# Patient Record
Sex: MALE | Race: WHITE | NOT HISPANIC OR LATINO | Employment: FULL TIME | URBAN - METROPOLITAN AREA
[De-identification: names, ages, dates, MRNs, and addresses within clinical notes are randomized per-mention and may not be internally consistent; named-entity substitution may affect disease eponyms.]

---

## 2017-01-17 ENCOUNTER — OFFICE VISIT (OUTPATIENT)
Dept: INTERNAL MEDICINE | Facility: CLINIC | Age: 57
End: 2017-01-17
Payer: COMMERCIAL

## 2017-01-17 VITALS
HEIGHT: 67 IN | BODY MASS INDEX: 36.81 KG/M2 | WEIGHT: 234.56 LBS | HEART RATE: 97 BPM | SYSTOLIC BLOOD PRESSURE: 118 MMHG | DIASTOLIC BLOOD PRESSURE: 74 MMHG

## 2017-01-17 DIAGNOSIS — R73.02 GLUCOSE INTOLERANCE (IMPAIRED GLUCOSE TOLERANCE): ICD-10-CM

## 2017-01-17 DIAGNOSIS — I10 ESSENTIAL HYPERTENSION: ICD-10-CM

## 2017-01-17 DIAGNOSIS — Z12.11 SCREEN FOR COLON CANCER: ICD-10-CM

## 2017-01-17 DIAGNOSIS — G43.511 INTRACTABLE PERSISTENT MIGRAINE AURA WITHOUT CEREBRAL INFARCTION AND WITH STATUS MIGRAINOSUS: Primary | ICD-10-CM

## 2017-01-17 DIAGNOSIS — Z13.9 SCREENING: ICD-10-CM

## 2017-01-17 DIAGNOSIS — Z23 NEED FOR TDAP VACCINATION: ICD-10-CM

## 2017-01-17 DIAGNOSIS — E66.01 MORBID OBESITY, UNSPECIFIED OBESITY TYPE: ICD-10-CM

## 2017-01-17 DIAGNOSIS — E78.2 HYPERLIPIDEMIA, MIXED: ICD-10-CM

## 2017-01-17 DIAGNOSIS — Z12.5 SCREENING FOR PROSTATE CANCER: ICD-10-CM

## 2017-01-17 PROCEDURE — 1159F MED LIST DOCD IN RCRD: CPT | Mod: S$GLB,,, | Performed by: INTERNAL MEDICINE

## 2017-01-17 PROCEDURE — 3074F SYST BP LT 130 MM HG: CPT | Mod: S$GLB,,, | Performed by: INTERNAL MEDICINE

## 2017-01-17 PROCEDURE — 3078F DIAST BP <80 MM HG: CPT | Mod: S$GLB,,, | Performed by: INTERNAL MEDICINE

## 2017-01-17 PROCEDURE — 99999 PR PBB SHADOW E&M-EST. PATIENT-LVL IV: CPT | Mod: PBBFAC,,, | Performed by: INTERNAL MEDICINE

## 2017-01-17 PROCEDURE — 99213 OFFICE O/P EST LOW 20 MIN: CPT | Mod: S$GLB,,, | Performed by: INTERNAL MEDICINE

## 2017-01-17 RX ORDER — BUTALBITAL, ACETAMINOPHEN AND CAFFEINE 50; 325; 40 MG/1; MG/1; MG/1
1 TABLET ORAL EVERY 4 HOURS PRN
Qty: 120 TABLET | Refills: 2 | Status: SHIPPED | OUTPATIENT
Start: 2017-01-17 | End: 2019-05-01 | Stop reason: ALTCHOICE

## 2017-01-17 RX ORDER — TIZANIDINE 2 MG/1
TABLET ORAL
Qty: 60 TABLET | Refills: 5 | Status: SHIPPED | OUTPATIENT
Start: 2017-01-17 | End: 2019-05-02

## 2017-01-17 RX ORDER — OMEPRAZOLE 20 MG/1
20 CAPSULE, DELAYED RELEASE ORAL EVERY MORNING
Qty: 90 CAPSULE | Refills: 3 | Status: SHIPPED | OUTPATIENT
Start: 2017-01-17 | End: 2017-12-25 | Stop reason: SDUPTHER

## 2017-01-17 RX ORDER — DIVALPROEX SODIUM 500 MG/1
500 TABLET, FILM COATED, EXTENDED RELEASE ORAL EVERY MORNING
Qty: 90 TABLET | Refills: 3
Start: 2017-01-17 | End: 2017-02-13 | Stop reason: SDUPTHER

## 2017-01-17 RX ORDER — VERAPAMIL HYDROCHLORIDE 240 MG/1
240 CAPSULE, EXTENDED RELEASE ORAL DAILY PRN
Qty: 90 CAPSULE | Refills: 3 | Status: SHIPPED | OUTPATIENT
Start: 2017-01-17 | End: 2018-02-10 | Stop reason: SDUPTHER

## 2017-01-17 RX ORDER — METFORMIN HYDROCHLORIDE 500 MG/1
500 TABLET, EXTENDED RELEASE ORAL 2 TIMES DAILY WITH MEALS
Qty: 180 TABLET | Refills: 3 | Status: SHIPPED | OUTPATIENT
Start: 2017-01-17 | End: 2018-02-21 | Stop reason: SDUPTHER

## 2017-01-17 RX ORDER — ATORVASTATIN CALCIUM 80 MG/1
80 TABLET, FILM COATED ORAL NIGHTLY
Qty: 90 TABLET | Refills: 3 | Status: SHIPPED | OUTPATIENT
Start: 2017-01-17 | End: 2018-02-10 | Stop reason: SDUPTHER

## 2017-01-17 RX ORDER — RIZATRIPTAN BENZOATE 10 MG/1
10 TABLET ORAL ONCE AS NEEDED
Qty: 18 TABLET | Refills: 6 | Status: SHIPPED | OUTPATIENT
Start: 2017-01-17 | End: 2019-05-02

## 2017-01-17 RX ORDER — FLUOXETINE HYDROCHLORIDE 40 MG/1
40 CAPSULE ORAL EVERY MORNING
Qty: 90 CAPSULE | Refills: 3 | Status: SHIPPED | OUTPATIENT
Start: 2017-01-17 | End: 2018-02-10 | Stop reason: SDUPTHER

## 2017-01-17 RX ORDER — OMEPRAZOLE 20 MG/1
20 CAPSULE, DELAYED RELEASE ORAL EVERY MORNING
Qty: 90 CAPSULE | Refills: 3 | Status: SHIPPED | OUTPATIENT
Start: 2017-01-17 | End: 2017-01-17 | Stop reason: SDUPTHER

## 2017-01-17 RX ORDER — TRAZODONE HYDROCHLORIDE 50 MG/1
50 TABLET ORAL NIGHTLY PRN
Qty: 90 TABLET | Refills: 3 | Status: SHIPPED | OUTPATIENT
Start: 2017-01-17 | End: 2018-02-27 | Stop reason: SDUPTHER

## 2017-01-17 RX ORDER — SILDENAFIL 100 MG/1
100 TABLET, FILM COATED ORAL DAILY PRN
Qty: 9 TABLET | Refills: 5 | Status: SHIPPED | OUTPATIENT
Start: 2017-01-17 | End: 2019-05-02

## 2017-01-17 NOTE — PATIENT INSTRUCTIONS
What is Insomnia?  Do you have trouble falling asleep? Do you wake up often during the night? Or, maybe you wake up too early in the morning. You may be suffering from insomnia. Talk to your health care provider if it lasts longer than a few weeks and you feel tired most of the time.    What causes insomnia?  Some common causes of insomnia are:  · Medical problems such as pain, depression, medication side effects, or trouble breathing  · Circadian rhythm disorder, a shift in the bodys normal 24-hour activity cycle  · Lifestyle factors such as a changing sleep schedule, lack of exercise, or too much caffeine  · Sleep settings such as a poor mattress, noise, or a room thats too hot or too cold  · Stress such as problems at work, money worries, or family events  Talk to your health care provider  Describe your sleeping problems to your health care provider. Try to keep a daily sleep diary for a couple weeks. Write down the time you go to bed, the time you wake up, and anything that seems to affect your sleep. Your health care provider can work with you to develop a treatment plan. You may need to learn good sleeping habits and make some lifestyle changes. If you have any medical problems, these may need to be treated first.  © 2960-1125 [employer]. [address] [place] [address], [place] [address]. All rights reserved. This information is not intended as a substitute for professional medical care. Always follow your healthcare professional's instructions.        Treating Insomnia  Good sleeping habits are a key part of treatment. If needed, some medications may help you sleep better at first. Making healthy lifestyle changes and learning to relax can improve your sleep. Treating insomnia takes commitment, but trust that your efforts will pay off. Talk to your health care provider before taking any medication.    Healthy lifestyle  Your lifestyle affects your health and your sleep. Here are some healthy  habits:  · Keep a regular sleep schedule. Go to bed and get up at the same time each day.  · Exercise regularly. It may help you reduce stress. Avoid strenuous exercise for 2 to 4 hours before bedtime.  · Avoid or limit naps, especially in the late afternoon.  · Use your bed only for sleep and sex.  · Dont spend too much time in bed trying to fall asleep. If you cant fall asleep, get up and do something (no electronics) until you become tired and drowsy.  · Avoid or limit caffeine and nicotine for up to 6 hours before bedtime. They can keep you awake at night.   · Also avoid alcohol for at least 4 to 6 hours before bedtime. It may help you fall asleep at first, but you will have more awakenings throughout the night, and your sleep will not be restful.  Before bedtime  To sleep better every night, try these tips:  · Have a bedtime routine to let your body and mind know when its time to sleep.  · Think of going to bed as relaxing and enjoyable. Sleep will come sooner.  · If your worries dont let you sleep, write them down in a diary. Then close it, and go to bed.  · Make sure the room is not too hot or too cold. If its not dark enough, an eye mask can help. If its noisy, try using earplugs.  Learn to relax  Stress, anxiety, and body tension may keep you awake at night. To unwind before bedtime, try a warm bath, meditation, or yoga. Also, try the following:  · Deep breathing. Sit or lie back in a chair. Take a slow, deep breath. Hold it for 5 counts. Then breathe out slowly through your mouth. Keep doing this until you feel relaxed.  · Progressive muscle relaxation. Tense and then relax the muscles in your body as you breathe deeply. Start with your feet and work up your body to your neck and face.  © 9760-2843 [employer]. [address] [place] [address], [place] [address]. All rights reserved. This information is not intended as a substitute for professional medical care. Always follow your healthcare  professional's instructions.        Insomnia  Insomnia is repeated difficulty going to sleep or staying asleep, or both. Whether you have insomnia is not defined by a specific amount of sleep. Different people need different amounts of sleep, and you may need more or less sleep at different times of your life.  There are 3 major types of insomnia:  short-term, chronic, and other.  Short-term, or acute insomnia lasts less than 3 months.  The symptoms are temporary and can be linked directly to a stressor, such as the death of a loved one, financial problems, or a new physical problem.  Short-term insomnia stops when the stressor resolves or the person adapts to its presence.  Chronic insomnia occurs at least 3 times a week and lasts longer than 3 months.  Chronic insomnia can occur when either the cause of the sleeping problem is not clear, or the insomnia does not get better when the stressor is resolved. A number of other criteria are also used to make the diagnosis of chronic insomnia.   Other insomnia is the third type of insomnia-related sleep disorders.  This description applies to people who have problems getting to sleep or staying asleep, but do not meet all of the factors that describe either short-term or chronic insomnia.    Many things cause insomnia. Different people may have different causes. It can be from an underlying medical or psychological condition, or lifestyle. It can also be primary insomnia, which means no cause can be found.  Causes of insomnia include:  · Chronic medical problems- heart disease, gastrointestinal problems, hormonal changes, breathing problems  · Anxiety  · Stress  · Depression  · Pain  · Work schedule  · Sleep apnea  · Illegal drugs  · Certain medicines  Many different medidcines can affect your sleep, such as stimulants, caffeine, alcohol, some decongestants, and diet pills. Other medicines may include some types of blood pressure pills, steroids, asthma medicines,  antihistamines, antidepressants, seizure medicines and statins. Not all of these will affect your sleep, and they shouldnt be stopped without talking to your doctor.  Symptoms of insomnia can include:  · Lying awake for long periods at night before falling asleep  · Waking up several times during the night  · Waking up early in the morning and not being able to get back to sleep  · Feeling tired and not refreshed by sleep  · Not being able to function properly during the day and finding it hard to concentrate  · Irritability  · Tiredness and fatigue during the day  Home care  1. Review your medicines with your doctor or pharmacist to find out if they can cause insomnia. Not all medicines will affect your sleep, but they shouldn't be stopped without reviewing them with your doctor. There may be serious side effects and consequences from suddenly stopping your medicines. Not taking them may cause strokes, heart attacks, and many other problems.  2. Caffeine, smoking and alcohol also affect sleep. Limit your daily use and do not use these before bedtime. Alcohol may make you sleepy at first, but as its effects wear off, you may awaken a few hours later and have trouble returning to sleep.  3. Do not exercise, eat or drink large amounts of liquid within 2 hours of your bedtime.  4. Improve your sleep habits. Have a fixed bed and wake-up time. Try to keep noise, light and heat in your bedroom at a comfortable level. Try using earplugs or eyeshades if needed.   5. Avoid watching TV in bed.  6. If you do not fall asleep within 30 minutes, try to relax by reading or listening to soft music.  7. Limit daytime napping to one 30 minute period, early in the day.  8. Get regular exercise. Find other ways to lessen your stress level.  9. If a medicine was prescribed to help reset your sleep patterns, take it as directed. Sleeping pills are intended for short-term use, only. If taken for too long, the effect wears off while the  risk of physical addiction and psychological dependence increases.  Sleep diary  If the cause isnt obvious and it is not improving, try keeping a sleep diary for a couple of weeks. Include in it:  · The time you go to bed  · How long it takes to fall asleep  · How many times you wake up  · What time you wake up  · Your meal times and what you eat  · What time you drink alcohol  · Your exercise habits and times  Follow-up care  Follow up with your healthcare provider, or as advised. If X-rays or CT scans were done, you will be notified if there is a change in the reading, especially if it affects treatment.  Call 911  Call 911 if any of these occur:  · Trouble breathing  · Confusion or trouble waking  · Fainting or loss of consciousness  · Rapid heart rate  · New chest, arm, shoulder, neck or upper back pain  · Trouble with speech or vision, weakness of an arm or leg  · Trouble walking or talking, loss of balance, numbness or weakness in one side of your body, facial droop  When to seek medical advice  Call your healthcare provider right away if any of these occur:  · Extreme restlessness or irritability  · Confusion or hallucinations (seeing or hearing things that are not there)  · Anxiety, depression  · Several days without sleeping  © 0592-3240 [employer]. [address] [place] [address], [place] [address]. All rights reserved. This information is not intended as a substitute for professional medical care. Always follow your healthcare professional's instructions.      Sildenafil Citrate Oral tablet  What is this medicine?  SILDENAFIL (leno DEN a esperanza) is used to treat erection problems in men.  This medicine may be used for other purposes; ask your health care provider or pharmacist if you have questions.  What should I tell my health care provider before I take this medicine?  They need to know if you have any of these conditions:  · bleeding disorders  · eye or vision problems, including a rare inherited  eye disease called retinitis pigmentosa  · anatomical deformation of the penis, Peyronies disease, or history of priapism (painful and prolonged erection)  · heart disease, angina, a history of heart attack, irregular heart beats, or other heart problems  · high or low blood pressure  · history of blood diseases, like sickle cell anemia or leukemia  · history of stomach bleeding  · kidney disease  · liver disease  · stroke  · an unusual or allergic reaction to sildenafil, other medicines, foods, dyes, or preservatives  · pregnant or trying to get pregnant  · breast-feeding  How should I use this medicine?  Take this medicine by mouth with a glass of water. Follow the directions on the prescription label. The dose is usually taken 1 hour before sexual activity. You should not take the dose more than once per day. Do not take your medicine more often than directed.  Talk to your pediatrician regarding the use of this medicine in children. This medicine is not used in children for this condition.  Overdosage: If you think you have taken too much of this medicine contact a poison control center or emergency room at once.  NOTE: This medicine is only for you. Do not share this medicine with others.  What if I miss a dose?  This does not apply. Do not take double or extra doses.  What may interact with this medicine?  Do not take this medicine with any of the following medications:  · cisapride  · methscopolamine nitrate  · nitrates like amyl nitrite, isosorbide dinitrate, isosorbide mononitrate, nitroglycerin  · nitroprusside  · other medicines for erectile dysfunction like avanafil, tadalafil, vardenafil  · riociguat  · other sildenafil products (Revatio)  This medicine may also interact with the following medications:  · certain drugs for high blood pressure  · certain drugs for the treatment of HIV infection or AIDS  · certain drugs used for fungal or yeast infections, like fluconazole, itraconazole, ketoconazole,  and voriconazole  · cimetidine  · erythromycin  · rifampin  This list may not describe all possible interactions. Give your health care provider a list of all the medicines, herbs, non-prescription drugs, or dietary supplements you use. Also tell them if you smoke, drink alcohol, or use illegal drugs. Some items may interact with your medicine.  What should I watch for while using this medicine?  If you notice any changes in your vision while taking this drug, call your doctor or health care professional as soon as possible. Stop using this medicine and call your health care provider right away if you have a loss of sight in one or both eyes.  Contact your doctor or health care professional right away if you have an erection that lasts longer than 4 hours or if it becomes painful. This may be a sign of a serious problem and must be treated right away to prevent permanent damage.  If you experience symptoms of nausea, dizziness, chest pain or arm pain upon initiation of sexual activity after taking this medicine, you should refrain from further activity and call your doctor or health care professional as soon as possible.  Do not drink alcohol to excess (examples, 5 glasses of wine or 5 shots of whiskey) when taking this medicine. When taken in excess, alcohol can increase your chances of getting a headache or getting dizzy, increasing your heart rate or lowering your blood pressure.  Using this medicine does not protect you or your partner against HIV infection (the virus that causes AIDS) or other sexually transmitted diseases.  What side effects may I notice from receiving this medicine?  Side effects that you should report to your doctor or health care professional as soon as possible:  · allergic reactions like skin rash, itching or hives, swelling of the face, lips, or tongue  · breathing problems  · changes in hearing  · changes in vision  · chest pain  · fast, irregular heartbeat  · prolonged or painful  erection  · seizures  Side effects that usually do not require medical attention (report to your doctor or health care professional if they continue or are bothersome):  · back pain  · dizziness  · flushing  · headache  · indigestion  · muscle aches  · nausea  · stuffy or runny nose  This list may not describe all possible side effects. Call your doctor for medical advice about side effects. You may report side effects to FDA at 3-943-ORB-1186.  Where should I keep my medicine?  Keep out of reach of children.  Store at room temperature between 15 and 30 degrees C (59 and 86 degrees F). Throw away any unused medicine after the expiration date.  NOTE:This sheet is a summary. It may not cover all possible information. If you have questions about this medicine, talk to your doctor, pharmacist, or health care provider. Copyright© 2016 [employer]

## 2017-01-17 NOTE — MR AVS SNAPSHOT
[facility]  [address]  [facility] [address]  Phone: [phone]  Fax: [phone]                  David Nicole   2017 1:20 PM   Office Visit    Description:  Male : 1960   Provider:  Sejal Hughes MD   Department:  [facility]           Reason for Visit     Medication Refill           Diagnoses this Visit        Comments    Intractable persistent migraine aura without cerebral infarction and with status migrainosus    -  Primary     Morbid obesity, unspecified obesity type         Hyperlipidemia, mixed         Essential hypertension         Screening         Screen for colon cancer         Glucose intolerance (impaired glucose tolerance)         Screening for prostate cancer         Need for Tdap vaccination                To Do List           Future Appointments        Provider Department Dept Phone    2017 8:10 AM LAB, APPOINTMENT [facility] [phone]      To Schedule:     Please call the Endoscopy Department at [phone] to schedule your appointment.          Goals (5 Years of Data)     None      Follow-Up and Disposition     Return in about 6 months (around 2017).       These Medications        Disp Refills Start End    divalproex (DEPAKOTE) 500 MG Tb24 90 tablet 3 2017     Take 1 tablet (500 mg total) by mouth every morning. - Oral    Pharmacy: [facility] - [address] Ph #: [phone]       atorvastatin (LIPITOR) 80 MG tablet 90 tablet 3 2017     Take 1 tablet (80 mg total) by mouth nightly. - Oral    Pharmacy: [facility] - [address] Ph #: [phone]       verapamil (VERELAN) 240 MG C24P 90 capsule 3 2017     Take 1 capsule (240 mg total) by mouth daily as needed. Blood pressure control and migraine prevention - Oral    Pharmacy: [facility] -  [address] Ph #: [phone]       metformin (GLUCOPHAGE-XR) 500 MG 24 hr tablet 180 tablet 3 1/17/2017     Take 1 tablet (500 mg total) by mouth 2 (two) times daily with meals. - Oral    Pharmacy: [facility] - [address] Ph #: [phone]       fluoxetine (PROZAC) 40 MG capsule 90 capsule 3 1/17/2017     Take 1 capsule (40 mg total) by mouth every morning. - Oral    Pharmacy: [facility] - [address] Ph #: [phone]       rizatriptan (MAXALT) 10 MG tablet 18 tablet 6 1/17/2017 1/17/2017    Take 1 tablet (10 mg total) by mouth once as needed for Migraine. Only one tablet per 24 hours - Oral    [facility]: [facility] [place] [address] Ph #: [phone]       Notes to Pharmacy: Please provide the maximum number of tablets allowed by the insurance company    butalbital-acetaminophen-caffeine -40 mg (FIORICET, ESGIC) -40 mg per tablet 120 tablet 2 1/17/2017     Take 1 tablet by mouth every 4 (four) hours as needed for Pain. - Oral    [facility]: [facility] [place] [address] Ph #: [phone]       tizanidine (ZANAFLEX) 2 MG tablet 60 tablet 5 1/17/2017     1-2 pills around bedtime as needed for headache or neck pain    Pharmacy: [facility] [place] [address] Ph #: [phone]       trazodone (DESYREL) 50 MG tablet 90 tablet 3 1/17/2017     Take 1 tablet (50 mg total) by mouth nightly as needed for Insomnia. - Oral    [facility]: [facility] [place] [address] Ph #: [phone]       sildenafil (VIAGRA) 100 MG tablet 9 tablet 5 1/17/2017 1/17/2018    Take 1 tablet (100 mg total) by mouth daily as needed for Erectile Dysfunction. - Oral    Ph[facility]acy: [id number] - [facility] [address] Ph #: [phone]       omeprazole (PRILOSEC) 20 MG capsule 90  capsule 3 1/17/2017     Take 1 capsule (20 mg total) by mouth every morning. - Oral    Pharmacy: [facility] - [address] #: [phone]         [facility]     [facility] On Call Nurse Care Line - 24/7 Assistance  Registered nurses in the [facility] Call Center provide clinical advisement, health education, appointment booking, and other advisory services.  Call for this free service at [phone].             Medications           START taking these NEW medications        Refills    sildenafil (VIAGRA) 100 MG tablet 5    Sig: Take 1 tablet (100 mg total) by mouth daily as needed for Erectile Dysfunction.    Class: Normal    Route: Oral      CHANGE how you are taking these medications     Start Taking Instead of    divalproex (DEPAKOTE) 500 MG Tb24 divalproex (DEPAKOTE) 500 MG Tb24    Dosage:  Take 1 tablet (500 mg total) by mouth every morning. Dosage:  Take 2 tablets (1,000 mg total) by mouth every morning.    Reason for Change:  Reorder     atorvastatin (LIPITOR) 80 MG tablet atorvastatin (LIPITOR) 80 MG tablet    Dosage:  Take 1 tablet (80 mg total) by mouth nightly. Dosage:  TAKE 1 TABLET ONCE DAILY    Reason for Change:  Reorder     verapamil (VERELAN) 240 MG C24P verapamil (VERELAN) 240 MG C24P    Dosage:  Take 1 capsule (240 mg total) by mouth daily as needed. Blood pressure control and migraine prevention Dosage:  TAKE 1 CAPSULE ONCE DAILY    Reason for Change:  Reorder     metformin (GLUCOPHAGE-XR) 500 MG 24 hr tablet metformin (GLUCOPHAGE-XR) 500 MG 24 hr tablet    Dosage:  Take 1 tablet (500 mg total) by mouth 2 (two) times daily with meals. Dosage:  TAKE 1 TABLET TWICE A DAY WITH MEALS    Reason for Change:  Reorder     fluoxetine (PROZAC) 40 MG capsule fluoxetine (PROZAC) 40 MG capsule    Dosage:  Take 1 capsule (40 mg total) by mouth every morning. Dosage:  TAKE 1 CAPSULE ONCE DAILY    Reason for Change:  Reorder     rizatriptan (MAXALT) 10 MG  tablet rizatriptan (MAXALT) 10 MG tablet    Dosage:  Take 1 tablet (10 mg total) by mouth once as needed for Migraine. Only one tablet per 24 hours     Reason for Change:  Reorder     omeprazole (PRILOSEC) 20 MG capsule omeprazole (PRILOSEC) 40 MG capsule    Dosage:  Take 1 capsule (20 mg total) by mouth every morning. Dosage:  Take 1 capsule (40 mg total) by mouth every morning.    Reason for Change:  Reorder       STOP taking these medications     amoxicillin-clavulanate 875-125mg (AUGMENTIN) 875-125 mg per tablet            Verify that the below list of medications is an accurate representation of the medications you are currently taking.  If none reported, the list may be blank. If incorrect, please contact your healthcare provider. Carry this list with you in case of emergency.           Current Medications     ascorbic acid, vitamin C, (VITAMIN C) 100 MG tablet Take 100 mg by mouth once daily.    atorvastatin (LIPITOR) 80 MG tablet Take 1 tablet (80 mg total) by mouth nightly.    butalbital-acetaminophen-caffeine -40 mg (FIORICET, ESGIC) -40 mg per tablet Take 1 tablet by mouth every 4 (four) hours as needed for Pain.    CYANOCOBALAMIN, VITAMIN B-12, (VITAMIN B-12 ORAL) Take by mouth.    divalproex (DEPAKOTE) 500 MG Tb24 Take 1 tablet (500 mg total) by mouth every morning.    fluoxetine (PROZAC) 40 MG capsule Take 1 capsule (40 mg total) by mouth every morning.    fluticasone (FLONASE) 50 mcg/actuation nasal spray USE 2 SPRAYS IN EACH NOSTRIL ONCE DAILY    metformin (GLUCOPHAGE-XR) 500 MG 24 hr tablet Take 1 tablet (500 mg total) by mouth 2 (two) times daily with meals.    multivitamin (ONE DAILY MULTIVITAMIN) per tablet Take 1 tablet by mouth once daily.    omeprazole (PRILOSEC) 20 MG capsule Take 1 capsule (20 mg total) by mouth every morning.    ondansetron (ZOFRAN-ODT) 8 MG TbDL Take 1 tablet (8 mg total) by mouth every 8 (eight) hours as needed.    rizatriptan (MAXALT) 10 MG tablet Take 1  "tablet (10 mg total) by mouth once as needed for Migraine. Only one tablet per 24 hours    SAW PALMETTO ORAL Take by mouth.    tizanidine (ZANAFLEX) 2 MG tablet 1-2 pills around bedtime as needed for headache or neck pain    trazodone (DESYREL) 50 MG tablet Take 1 tablet (50 mg total) by mouth nightly as needed for Insomnia.    verapamil (VERELAN) 240 MG C24P Take 1 capsule (240 mg total) by mouth daily as needed. Blood pressure control and migraine prevention    sildenafil (VIAGRA) 100 MG tablet Take 1 tablet (100 mg total) by mouth daily as needed for Erectile Dysfunction.           Clinical Reference Information           Vital Signs - Last Recorded  Most recent update: 1/17/2017  2:31 PM by Sejal Hughes MD    BP Pulse Ht Wt BMI    118/74 97 5' 7" (1.702 m) 106.4 kg (234 lb 9.1 oz) 36.74 kg/m2      Blood Pressure          Most Recent Value    BP  118/74      Allergies as of 1/17/2017     No Known Allergies      Immunizations Administered on Date of Encounter - 1/17/2017     None      Orders Placed During Today's Visit      Normal Orders This Visit    Ambulatory Referral to [facility]     Case request GI: COLONOSCOPY     Future Labs/Procedures Expected by Expires    CBC auto differential  1/17/2017 1/17/2018    Comprehensive metabolic panel  1/17/2017 3/18/2018    Hemoglobin A1c  1/17/2017 3/18/2018    Hepatitis C antibody  1/17/2017 1/17/2018    Lipid panel  1/17/2017 3/18/2018    PSA, Screening  1/17/2017 3/18/2018    TSH  1/17/2017 3/18/2018      Instructions      What is Insomnia?  Do you have trouble falling asleep? Do you wake up often during the night? Or, maybe you wake up too early in the morning. You may be suffering from insomnia. Talk to your health care provider if it lasts longer than a few weeks and you feel tired most of the time.    What causes insomnia?  Some common causes of insomnia are:  · Medical problems such as pain, depression, medication side effects, or " trouble breathing  · Circadian rhythm disorder, a shift in the bodys normal 24-hour activity cycle  · Lifestyle factors such as a changing sleep schedule, lack of exercise, or too much caffeine  · Sleep settings such as a poor mattress, noise, or a room thats too hot or too cold  · Stress such as problems at work, money worries, or family events  Talk to your health care provider  Describe your sleeping problems to your health care provider. Try to keep a daily sleep diary for a couple weeks. Write down the time you go to bed, the time you wake up, and anything that seems to affect your sleep. Your health care provider can work with you to develop a treatment plan. You may need to learn good sleeping habits and make some lifestyle changes. If you have any medical problems, these may need to be treated first.  © 4795-8200 [employer]. [address] [place] [address]. All rights reserved. This information is not intended as a substitute for professional medical care. Always follow your healthcare professional's instructions.        Treating Insomnia  Good sleeping habits are a key part of treatment. If needed, some medications may help you sleep better at first. Making healthy lifestyle changes and learning to relax can improve your sleep. Treating insomnia takes commitment, but trust that your efforts will pay off. Talk to your health care provider before taking any medication.    Healthy lifestyle  Your lifestyle affects your health and your sleep. Here are some healthy habits:  · Keep a regular sleep schedule. Go to bed and get up at the same time each day.  · Exercise regularly. It may help you reduce stress. Avoid strenuous exercise for 2 to 4 hours before bedtime.  · Avoid or limit naps, especially in the late afternoon.  · Use your bed only for sleep and sex.  · Dont spend too much time in bed trying to fall asleep. If you cant fall asleep, get up and do something (no  electronics) until you become tired and drowsy.  · Avoid or limit caffeine and nicotine for up to 6 hours before bedtime. They can keep you awake at night.   · Also avoid alcohol for at least 4 to 6 hours before bedtime. It may help you fall asleep at first, but you will have more awakenings throughout the night, and your sleep will not be restful.  Before bedtime  To sleep better every night, try these tips:  · Have a bedtime routine to let your body and mind know when its time to sleep.  · Think of going to bed as relaxing and enjoyable. Sleep will come sooner.  · If your worries dont let you sleep, write them down in a diary. Then close it, and go to bed.  · Make sure the room is not too hot or too cold. If its not dark enough, an eye mask can help. If its noisy, try using earplugs.  Learn to relax  Stress, anxiety, and body tension may keep you awake at night. To unwind before bedtime, try a warm bath, meditation, or yoga. Also, try the following:  · Deep breathing. Sit or lie back in a chair. Take a slow, deep breath. Hold it for 5 counts. Then breathe out slowly through your mouth. Keep doing this until you feel relaxed.  · Progressive muscle relaxation. Tense and then relax the muscles in your body as you breathe deeply. Start with your feet and work up your body to your neck and face.  © 1735-4844 [employer]. [address] [place] [address], [place] [address]. All rights reserved. This information is not intended as a substitute for professional medical care. Always follow your healthcare professional's instructions.        Insomnia  Insomnia is repeated difficulty going to sleep or staying asleep, or both. Whether you have insomnia is not defined by a specific amount of sleep. Different people need different amounts of sleep, and you may need more or less sleep at different times of your life.  There are 3 major types of insomnia:  short-term, chronic, and other.  Short-term, or acute insomnia  lasts less than 3 months.  The symptoms are temporary and can be linked directly to a stressor, such as the death of a loved one, financial problems, or a new physical problem.  Short-term insomnia stops when the stressor resolves or the person adapts to its presence.  Chronic insomnia occurs at least 3 times a week and lasts longer than 3 months.  Chronic insomnia can occur when either the cause of the sleeping problem is not clear, or the insomnia does not get better when the stressor is resolved. A number of other criteria are also used to make the diagnosis of chronic insomnia.   Other insomnia is the third type of insomnia-related sleep disorders.  This description applies to people who have problems getting to sleep or staying asleep, but do not meet all of the factors that describe either short-term or chronic insomnia.    Many things cause insomnia. Different people may have different causes. It can be from an underlying medical or psychological condition, or lifestyle. It can also be primary insomnia, which means no cause can be found.  Causes of insomnia include:  · Chronic medical problems- heart disease, gastrointestinal problems, hormonal changes, breathing problems  · Anxiety  · Stress  · Depression  · Pain  · Work schedule  · Sleep apnea  · Illegal drugs  · Certain medicines  Many different medidcines can affect your sleep, such as stimulants, caffeine, alcohol, some decongestants, and diet pills. Other medicines may include some types of blood pressure pills, steroids, asthma medicines, antihistamines, antidepressants, seizure medicines and statins. Not all of these will affect your sleep, and they shouldnt be stopped without talking to your doctor.  Symptoms of insomnia can include:  · Lying awake for long periods at night before falling asleep  · Waking up several times during the night  · Waking up early in the morning and not being able to get back to sleep  · Feeling tired and not refreshed  by sleep  · Not being able to function properly during the day and finding it hard to concentrate  · Irritability  · Tiredness and fatigue during the day  Home care  1. Review your medicines with your doctor or pharmacist to find out if they can cause insomnia. Not all medicines will affect your sleep, but they shouldn't be stopped without reviewing them with your doctor. There may be serious side effects and consequences from suddenly stopping your medicines. Not taking them may cause strokes, heart attacks, and many other problems.  2. Caffeine, smoking and alcohol also affect sleep. Limit your daily use and do not use these before bedtime. Alcohol may make you sleepy at first, but as its effects wear off, you may awaken a few hours later and have trouble returning to sleep.  3. Do not exercise, eat or drink large amounts of liquid within 2 hours of your bedtime.  4. Improve your sleep habits. Have a fixed bed and wake-up time. Try to keep noise, light and heat in your bedroom at a comfortable level. Try using earplugs or eyeshades if needed.   5. Avoid watching TV in bed.  6. If you do not fall asleep within 30 minutes, try to relax by reading or listening to soft music.  7. Limit daytime napping to one 30 minute period, early in the day.  8. Get regular exercise. Find other ways to lessen your stress level.  9. If a medicine was prescribed to help reset your sleep patterns, take it as directed. Sleeping pills are intended for short-term use, only. If taken for too long, the effect wears off while the risk of physical addiction and psychological dependence increases.  Sleep diary  If the cause isnt obvious and it is not improving, try keeping a sleep diary for a couple of weeks. Include in it:  · The time you go to bed  · How long it takes to fall asleep  · How many times you wake up  · What time you wake up  · Your meal times and what you eat  · What time you drink alcohol  · Your exercise habits and  times  Follow-up care  Follow up with your healthcare provider, or as advised. If X-rays or CT scans were done, you will be notified if there is a change in the reading, especially if it affects treatment.  Call 911  Call 911 if any of these occur:  · Trouble breathing  · Confusion or trouble waking  · Fainting or loss of consciousness  · Rapid heart rate  · New chest, arm, shoulder, neck or upper back pain  · Trouble with speech or vision, weakness of an arm or leg  · Trouble walking or talking, loss of balance, numbness or weakness in one side of your body, facial droop  When to seek medical advice  Call your healthcare provider right away if any of these occur:  · Extreme restlessness or irritability  · Confusion or hallucinations (seeing or hearing things that are not there)  · Anxiety, depression  · Several days without sleeping  © 2795-2445 [employer]. [address] [place] [address]. All rights reserved. This information is not intended as a substitute for professional medical care. Always follow your healthcare professional's instructions.      Sildenafil Citrate Oral tablet  What is this medicine?  SILDENAFIL (leno DEN a esperanza) is used to treat erection problems in men.  This medicine may be used for other purposes; ask your health care provider or pharmacist if you have questions.  What should I tell my health care provider before I take this medicine?  They need to know if you have any of these conditions:  · bleeding disorders  · eye or vision problems, including a rare inherited eye disease called retinitis pigmentosa  · anatomical deformation of the penis, Peyronies disease, or history of priapism (painful and prolonged erection)  · heart disease, angina, a history of heart attack, irregular heart beats, or other heart problems  · high or low blood pressure  · history of blood diseases, like sickle cell anemia or leukemia  · history of stomach bleeding  · kidney disease  · liver  disease  · stroke  · an unusual or allergic reaction to sildenafil, other medicines, foods, dyes, or preservatives  · pregnant or trying to get pregnant  · breast-feeding  How should I use this medicine?  Take this medicine by mouth with a glass of water. Follow the directions on the prescription label. The dose is usually taken 1 hour before sexual activity. You should not take the dose more than once per day. Do not take your medicine more often than directed.  Talk to your pediatrician regarding the use of this medicine in children. This medicine is not used in children for this condition.  Overdosage: If you think you have taken too much of this medicine contact a poison control center or emergency room at once.  NOTE: This medicine is only for you. Do not share this medicine with others.  What if I miss a dose?  This does not apply. Do not take double or extra doses.  What may interact with this medicine?  Do not take this medicine with any of the following medications:  · cisapride  · methscopolamine nitrate  · nitrates like amyl nitrite, isosorbide dinitrate, isosorbide mononitrate, nitroglycerin  · nitroprusside  · other medicines for erectile dysfunction like avanafil, tadalafil, vardenafil  · riociguat  · other sildenafil products (Revatio)  This medicine may also interact with the following medications:  · certain drugs for high blood pressure  · certain drugs for the treatment of HIV infection or AIDS  · certain drugs used for fungal or yeast infections, like fluconazole, itraconazole, ketoconazole, and voriconazole  · cimetidine  · erythromycin  · rifampin  This list may not describe all possible interactions. Give your health care provider a list of all the medicines, herbs, non-prescription drugs, or dietary supplements you use. Also tell them if you smoke, drink alcohol, or use illegal drugs. Some items may interact with your medicine.  What should I watch for while using this medicine?  If you  notice any changes in your vision while taking this drug, call your doctor or health care professional as soon as possible. Stop using this medicine and call your health care provider right away if you have a loss of sight in one or both eyes.  Contact your doctor or health care professional right away if you have an erection that lasts longer than 4 hours or if it becomes painful. This may be a sign of a serious problem and must be treated right away to prevent permanent damage.  If you experience symptoms of nausea, dizziness, chest pain or arm pain upon initiation of sexual activity after taking this medicine, you should refrain from further activity and call your doctor or health care professional as soon as possible.  Do not drink alcohol to excess (examples, 5 glasses of wine or 5 shots of whiskey) when taking this medicine. When taken in excess, alcohol can increase your chances of getting a headache or getting dizzy, increasing your heart rate or lowering your blood pressure.  Using this medicine does not protect you or your partner against HIV infection (the virus that causes AIDS) or other sexually transmitted diseases.  What side effects may I notice from receiving this medicine?  Side effects that you should report to your doctor or health care professional as soon as possible:  · allergic reactions like skin rash, itching or hives, swelling of the face, lips, or tongue  · breathing problems  · changes in hearing  · changes in vision  · chest pain  · fast, irregular heartbeat  · prolonged or painful erection  · seizures  Side effects that usually do not require medical attention (report to your doctor or health care professional if they continue or are bothersome):  · back pain  · dizziness  · flushing  · headache  · indigestion  · muscle aches  · nausea  · stuffy or runny nose  This list may not describe all possible side effects. Call your doctor for medical advice about side effects. You may report  side effects to FDA at 6-017-FDA-0621.  Where should I keep my medicine?  Keep out of reach of children.  Store at room temperature between 15 and 30 degrees C (59 and 86 degrees F). Throw away any unused medicine after the expiration date.  NOTE:This sheet is a summary. It may not cover all possible information. If you have questions about this medicine, talk to your doctor, pharmacist, or health care provider. Copyright© 2016 [employer]

## 2017-01-18 NOTE — PROGRESS NOTES
Subjective:       Patient ID: David Nicole is a 56 y.o. male.    Chief Complaint: Medication Refill  's most significant problem is headache.  His last headache was 3 weeks ago  He would like to reduce the Depakote because he thinks he's gained weight on this medication    He sees as his biggest health problem as his weight which he thinks is related to poor diet  and he also has  anxiety and he would like to work with our health .  HPI  Review of Systems   Constitutional: Negative for activity change, chills and fever.   HENT: Negative for congestion, dental problem, hearing loss, tinnitus and trouble swallowing.    Eyes: Negative for visual disturbance.   Respiratory: Negative for cough, shortness of breath and wheezing.    Cardiovascular: Negative for chest pain, palpitations and leg swelling.   Genitourinary: Negative for dysuria, frequency and urgency.   Musculoskeletal: Negative for back pain and neck pain.   Neurological: Negative for dizziness, weakness and headaches.   Psychiatric/Behavioral: Negative for dysphoric mood and sleep disturbance. The patient is not nervous/anxious.        Objective:      Physical Exam   Constitutional: He is oriented to person, place, and time. No distress.   HENT:   Head: Atraumatic.   Eyes: Conjunctivae are normal. No scleral icterus.   Neck: Neck supple.   Cardiovascular: Normal rate and regular rhythm.    Pulmonary/Chest: Effort normal and breath sounds normal.   Abdominal: Soft. There is no tenderness.   Musculoskeletal: He exhibits no edema.   Lymphadenopathy:     He has no cervical adenopathy.   Neurological: He is alert and oriented to person, place, and time.   Skin: Skin is warm and dry.   Psychiatric: He has a normal mood and affect. His behavior is normal.       Assessment:       1. Intractable persistent migraine aura without cerebral infarction and with status migrainosus    2. Morbid obesity, unspecified obesity type    3. Hyperlipidemia, mixed    4.  Essential hypertension    5. Screening    6. Screen for colon cancer    7. Glucose intolerance (impaired glucose tolerance)    8. Screening for prostate cancer    9. Need for Tdap vaccination        Plan:   David was seen today for medication refill.    Diagnoses and all orders for this visit:    Intractable persistent migraine aura without cerebral infarction and with status migrainosus.  Medications for migraine prevention adjusted.  Medications for self treatment reordered.    Morbid obesity, unspecified obesity type.  He would like to work with our health .  -     [facility] Ref[facility]    Hyperlipidemia, mixed  -     Lipid panel; Future  -     TSH; Future    Essential hypertension  -     CBC auto differential; Future  -     Comprehensive metabolic panel; Future  -     TSH; Future    Screening  -     Hepatitis C antibody; Future    Screen for colon cancer  -     Case request GI: COLONOSCOPY    Glucose intolerance (impaired glucose tolerance)  -     Hemoglobin A1c; Future  -     TSH; Future    Screening for prostate cancer  -     PSA, Screening; Future    Need for Tdap vaccination    Other orders  -     divalproex (DEPAKOTE) 500 MG Tb24; Take 1 tablet (500 mg total) by mouth every morning.  -     atorvastatin (LIPITOR) 80 MG tablet; Take 1 tablet (80 mg total) by mouth nightly.  -     verapamil (VERELAN) 240 MG C24P; Take 1 capsule (240 mg total) by mouth daily as needed. Blood pressure control and migraine prevention  -     metformin (GLUCOPHAGE-XR) 500 MG 24 hr tablet; Take 1 tablet (500 mg total) by mouth 2 (two) times daily with meals.  -     fluoxetine (PROZAC) 40 MG capsule; Take 1 capsule (40 mg total) by mouth every morning.  -     Discontinue: omeprazole (PRILOSEC) 20 MG capsule; Take 1 capsule (20 mg total) by mouth every morning.  -     rizatriptan (MAXALT) 10 MG tablet; Take 1 tablet (10 mg total) by mouth once as needed for Migraine. Only one tablet per 24  hours  -     butalbital-acetaminophen-caffeine -40 mg (FIORICET, ESGIC) -40 mg per tablet; Take 1 tablet by mouth every 4 (four) hours as needed for Pain.  -     tizanidine (ZANAFLEX) 2 MG tablet; 1-2 pills around bedtime as needed for headache or neck pain  -     trazodone (DESYREL) 50 MG tablet; Take 1 tablet (50 mg total) by mouth nightly as needed for Insomnia.  -     sildenafil (VIAGRA) 100 MG tablet; Take 1 tablet (100 mg total) by mouth daily as needed for Erectile Dysfunction.  -     omeprazole (PRILOSEC) 20 MG capsule; Take 1 capsule (20 mg total) by mouth every morning.  Return in about 6 months (around 7/17/2017).

## 2017-01-19 ENCOUNTER — TELEPHONE (OUTPATIENT)
Dept: INTERNAL MEDICINE | Facility: CLINIC | Age: 57
End: 2017-01-19

## 2017-01-19 NOTE — TELEPHONE ENCOUNTER
..Called patient left message, intro self, referred by Dr. Hughes.   Request call back at direct number [phone].  Caryn Pat RN HC

## 2017-01-21 ENCOUNTER — LAB VISIT (OUTPATIENT)
Dept: LAB | Facility: HOSPITAL | Age: 57
End: 2017-01-21
Attending: INTERNAL MEDICINE
Payer: COMMERCIAL

## 2017-01-21 DIAGNOSIS — Z12.5 SCREENING FOR PROSTATE CANCER: ICD-10-CM

## 2017-01-21 DIAGNOSIS — Z13.9 SCREENING: ICD-10-CM

## 2017-01-21 DIAGNOSIS — I10 ESSENTIAL HYPERTENSION: ICD-10-CM

## 2017-01-21 DIAGNOSIS — R73.02 GLUCOSE INTOLERANCE (IMPAIRED GLUCOSE TOLERANCE): ICD-10-CM

## 2017-01-21 DIAGNOSIS — E78.2 HYPERLIPIDEMIA, MIXED: ICD-10-CM

## 2017-01-21 LAB
ALBUMIN SERPL BCP-MCNC: 3.4 G/DL
ALP SERPL-CCNC: 73 U/L
ALT SERPL W/O P-5'-P-CCNC: 16 U/L
ANION GAP SERPL CALC-SCNC: 9 MMOL/L
AST SERPL-CCNC: 23 U/L
BASOPHILS # BLD AUTO: 0.04 K/UL
BASOPHILS NFR BLD: 0.4 %
BILIRUB SERPL-MCNC: 0.3 MG/DL
BUN SERPL-MCNC: 15 MG/DL
CALCIUM SERPL-MCNC: 9 MG/DL
CHLORIDE SERPL-SCNC: 106 MMOL/L
CHOLEST/HDLC SERPL: 3.5 {RATIO}
CO2 SERPL-SCNC: 26 MMOL/L
COMPLEXED PSA SERPL-MCNC: 1 NG/ML
CREAT SERPL-MCNC: 0.8 MG/DL
DIFFERENTIAL METHOD: ABNORMAL
EOSINOPHIL # BLD AUTO: 0.1 K/UL
EOSINOPHIL NFR BLD: 0.6 %
ERYTHROCYTE [DISTWIDTH] IN BLOOD BY AUTOMATED COUNT: 17.3 %
EST. GFR  (AFRICAN AMERICAN): >60 ML/MIN/1.73 M^2
EST. GFR  (NON AFRICAN AMERICAN): >60 ML/MIN/1.73 M^2
GLUCOSE SERPL-MCNC: 75 MG/DL
HCT VFR BLD AUTO: 33.6 %
HDL/CHOLESTEROL RATIO: 28.2 %
HDLC SERPL-MCNC: 110 MG/DL
HDLC SERPL-MCNC: 31 MG/DL
HGB BLD-MCNC: 10 G/DL
LDLC SERPL CALC-MCNC: 63.4 MG/DL
LYMPHOCYTES # BLD AUTO: 2 K/UL
LYMPHOCYTES NFR BLD: 20.6 %
MCH RBC QN AUTO: 21.8 PG
MCHC RBC AUTO-ENTMCNC: 29.8 %
MCV RBC AUTO: 73 FL
MONOCYTES # BLD AUTO: 1.4 K/UL
MONOCYTES NFR BLD: 14.9 %
NEUTROPHILS # BLD AUTO: 6.1 K/UL
NEUTROPHILS NFR BLD: 63.1 %
NONHDLC SERPL-MCNC: 79 MG/DL
PLATELET # BLD AUTO: 365 K/UL
PMV BLD AUTO: 9.9 FL
POTASSIUM SERPL-SCNC: 4.5 MMOL/L
PROT SERPL-MCNC: 6.7 G/DL
RBC # BLD AUTO: 4.58 M/UL
SODIUM SERPL-SCNC: 141 MMOL/L
TRIGL SERPL-MCNC: 78 MG/DL
TSH SERPL DL<=0.005 MIU/L-ACNC: 2.4 UIU/ML
WBC # BLD AUTO: 9.62 K/UL

## 2017-01-21 PROCEDURE — 85025 COMPLETE CBC W/AUTO DIFF WBC: CPT

## 2017-01-21 PROCEDURE — 80061 LIPID PANEL: CPT

## 2017-01-21 PROCEDURE — 83036 HEMOGLOBIN GLYCOSYLATED A1C: CPT

## 2017-01-21 PROCEDURE — 80053 COMPREHEN METABOLIC PANEL: CPT

## 2017-01-21 PROCEDURE — 84153 ASSAY OF PSA TOTAL: CPT

## 2017-01-21 PROCEDURE — 36415 COLL VENOUS BLD VENIPUNCTURE: CPT

## 2017-01-21 PROCEDURE — 84443 ASSAY THYROID STIM HORMONE: CPT

## 2017-01-21 PROCEDURE — 86803 HEPATITIS C AB TEST: CPT

## 2017-01-22 LAB
ESTIMATED AVG GLUCOSE: 126 MG/DL
HBA1C MFR BLD HPLC: 6 %

## 2017-01-23 LAB — HCV AB SERPL QL IA: NEGATIVE

## 2017-01-24 ENCOUNTER — TELEPHONE (OUTPATIENT)
Dept: INTERNAL MEDICINE | Facility: CLINIC | Age: 57
End: 2017-01-24

## 2017-01-25 NOTE — TELEPHONE ENCOUNTER
Please schedule an appointment for this patient.    He has developed an anemia  We need to figure out where he is loosing blood.

## 2017-02-07 ENCOUNTER — TELEPHONE (OUTPATIENT)
Dept: ENDOSCOPY | Facility: HOSPITAL | Age: 57
End: 2017-02-07

## 2017-02-07 ENCOUNTER — OFFICE VISIT (OUTPATIENT)
Dept: INTERNAL MEDICINE | Facility: CLINIC | Age: 57
End: 2017-02-07
Payer: COMMERCIAL

## 2017-02-07 VITALS
HEIGHT: 67 IN | HEART RATE: 84 BPM | WEIGHT: 231.94 LBS | SYSTOLIC BLOOD PRESSURE: 126 MMHG | DIASTOLIC BLOOD PRESSURE: 78 MMHG | BODY MASS INDEX: 36.4 KG/M2

## 2017-02-07 DIAGNOSIS — Z12.11 SPECIAL SCREENING FOR MALIGNANT NEOPLASMS, COLON: Primary | ICD-10-CM

## 2017-02-07 DIAGNOSIS — D64.9 ANEMIA, UNSPECIFIED TYPE: Primary | ICD-10-CM

## 2017-02-07 DIAGNOSIS — Z12.11 SCREEN FOR COLON CANCER: ICD-10-CM

## 2017-02-07 DIAGNOSIS — Z79.899 LONG-TERM USE OF HIGH-RISK MEDICATION: ICD-10-CM

## 2017-02-07 PROCEDURE — 99999 PR PBB SHADOW E&M-EST. PATIENT-LVL V: CPT | Mod: PBBFAC,,, | Performed by: INTERNAL MEDICINE

## 2017-02-07 PROCEDURE — 3078F DIAST BP <80 MM HG: CPT | Mod: S$GLB,,, | Performed by: INTERNAL MEDICINE

## 2017-02-07 PROCEDURE — 3074F SYST BP LT 130 MM HG: CPT | Mod: S$GLB,,, | Performed by: INTERNAL MEDICINE

## 2017-02-07 PROCEDURE — 99214 OFFICE O/P EST MOD 30 MIN: CPT | Mod: S$GLB,,, | Performed by: INTERNAL MEDICINE

## 2017-02-07 RX ORDER — POLYETHYLENE GLYCOL 3350, SODIUM SULFATE ANHYDROUS, SODIUM BICARBONATE, SODIUM CHLORIDE, POTASSIUM CHLORIDE 236; 22.74; 6.74; 5.86; 2.97 G/4L; G/4L; G/4L; G/4L; G/4L
4 POWDER, FOR SOLUTION ORAL ONCE
Qty: 4000 ML | Refills: 0 | Status: SHIPPED | OUTPATIENT
Start: 2017-02-07 | End: 2017-02-07

## 2017-02-07 NOTE — TELEPHONE ENCOUNTER
Patient is scheduled for EGD & Colonoscopy 2/13/2017 with Dr. James.  Instructions sent via e-mail.  Prep used: PEG.

## 2017-02-07 NOTE — MR AVS SNAPSHOT
[facility]  [address]  [facility] [address]  Phone: [phone]  Fax: [phone]                  David Nicole JrJeffery   2017 3:20 PM   Office Visit    Description:  Male : 1960   Provider:  Sejal Hughes MD   Department:  [facility] Internal Medicine           Reason for Visit     Anemia           Diagnoses this Visit        Comments    Anemia, unspecified type    -  Primary     Long-term use of high-risk medication         Screen for colon cancer                To Do List           To Schedule:     Please call the Endoscopy Department at [phone] to schedule your appointment.          Goals (5 Years of Data)     None      [facility]     [facility] -  Assistance  Registered nurses in the [facility] Center provide clinical advisement, health education, appointment booking, and other advisory services.  Call for this free service at [phone].             Medications                Verify that the below list of medications is an accurate representation of the medications you are currently taking.  If none reported, the list may be blank. If incorrect, please contact your healthcare provider. Carry this list with you in case of emergency.           Current Medications     ascorbic acid, vitamin C, (VITAMIN C) 100 MG tablet Take 100 mg by mouth once daily.    atorvastatin (LIPITOR) 80 MG tablet Take 1 tablet (80 mg total) by mouth nightly.    butalbital-acetaminophen-caffeine -40 mg (FIORICET, ESGIC) -40 mg per tablet Take 1 tablet by mouth every 4 (four) hours as needed for Pain.    CYANOCOBALAMIN, VITAMIN B-12, (VITAMIN B-12 ORAL) Take by mouth.    divalproex (DEPAKOTE) 500 MG Tb24 Take 1 tablet (500 mg total) by mouth every morning.    fluoxetine (PROZAC) 40 MG capsule Take 1 capsule (40 mg total) by mouth every morning.    fluticasone (FLONASE) 50 mcg/actuation nasal spray USE 2 SPRAYS IN EACH NOSTRIL  "ONCE DAILY    metformin (GLUCOPHAGE-XR) 500 MG 24 hr tablet Take 1 tablet (500 mg total) by mouth 2 (two) times daily with meals.    multivitamin (ONE DAILY MULTIVITAMIN) per tablet Take 1 tablet by mouth once daily.    omeprazole (PRILOSEC) 20 MG capsule Take 1 capsule (20 mg total) by mouth every morning.    ondansetron (ZOFRAN-ODT) 8 MG TbDL Take 1 tablet (8 mg total) by mouth every 8 (eight) hours as needed.    SAW PALMETTO ORAL Take by mouth.    sildenafil (VIAGRA) 100 MG tablet Take 1 tablet (100 mg total) by mouth daily as needed for Erectile Dysfunction.    tizanidine (ZANAFLEX) 2 MG tablet 1-2 pills around bedtime as needed for headache or neck pain    trazodone (DESYREL) 50 MG tablet Take 1 tablet (50 mg total) by mouth nightly as needed for Insomnia.    verapamil (VERELAN) 240 MG C24P Take 1 capsule (240 mg total) by mouth daily as needed. Blood pressure control and migraine prevention    rizatriptan (MAXALT) 10 MG tablet Take 1 tablet (10 mg total) by mouth once as needed for Migraine. Only one tablet per 24 hours           Clinical Reference Information           Your Vitals Were     BP Pulse Height Weight BMI    126/78 84 5' 7" (1.702 m) 105.2 kg (231 lb 14.8 oz) 36.32 kg/m2      Blood Pressure          Most Recent Value    BP  126/78      Allergies as of 2/7/2017     No Known Allergies      Immunizations Administered on Date of Encounter - 2/7/2017     None      Orders Placed During Today's Visit      Normal Orders This Visit    Case request GI: COLONOSCOPY     Case request GI: ESOPHAGOGASTRODUODENOSCOPY (EGD)     POCT Hemocult Stool X3       Language Assistance Services     ATTENTION: Language assistance services are available, free of charge. Please call [phone].      ATENCIÓN: Si habla mehreen, tiene a carlson disposición servicios gratuitos de asistencia lingüística. Llame al [phone].     CHÚ Ý: N?u b?n nói Ti?ng Vi?t, có các d?ch v? h? tr? ngôn ng? mi?n phí dành cho b?n. G?i s? " [phone].         [facility] complies with applicable Federal civil rights laws and does not discriminate on the basis of race, color, national origin, age, disability, or sex.

## 2017-02-08 NOTE — PROGRESS NOTES
Subjective:       Patient ID: David Nicole Jr. is a 56 y.o. male.    Chief Complaint: Anemia  i had called the patient to come in today because of anemia.   His wife accompanies him. He has not noticed any blood loss.  Twice when he went to donate blood he was turned away because his blood count was 2 low.  He didn't do anything about it.  His wife is concerned because when he gets the headache starts taking a lot of medication.  He'll take Benadryl,Tylenol, and just about anything around the house over the counter.  The patient says he has not taken any aspirin, ibuprofen or Aleve.    The patient reports that he had some sort of a problem witheither gastritis or something In the past ,and has been taking a PPI on a daily basis for a long period of time.  Anemia   Presents for initial visit. Onset time: unknown, less than one year. There has been no abdominal pain, anorexia, bruising/bleeding easily, confusion, fever, leg swelling, light-headedness, malaise/fatigue, pallor, palpitations, paresthesias, pica or weight loss. Signs of blood loss that are not present include hematemesis, hematochezia and melena. Past treatments include nothing. There is no history of alcohol abuse, cancer, chronic liver disease, chronic renal disease, clotting disorder, dementia, heart failure, hemoglobinopathy, HIV/AIDS, hypothyroidism, inflammatory bowel disease, malabsorption, malnutrition, neuropathy, recent illness, recent surgery, recent trauma or rheumatic disease. There is no past history of bone marrow exam, colonoscopy, EGD or FOBT. There are no compliance problems.      Review of Systems   Constitutional: Negative for activity change, chills, fever, malaise/fatigue and weight loss.   HENT: Negative for congestion, dental problem, hearing loss, tinnitus and trouble swallowing.    Eyes: Negative for visual disturbance.   Respiratory: Negative for cough, shortness of breath and wheezing.    Cardiovascular: Negative for chest  pain, palpitations and leg swelling.   Gastrointestinal: Negative for abdominal pain, anorexia, hematemesis, hematochezia and melena.   Genitourinary: Negative for dysuria, frequency, hematuria and urgency.   Musculoskeletal: Negative for arthralgias, back pain and neck pain.   Skin: Negative for pallor and rash.   Neurological: Negative for dizziness, weakness, light-headedness, headaches and paresthesias.   Hematological: Does not bruise/bleed easily.   Psychiatric/Behavioral: Negative for confusion, dysphoric mood and sleep disturbance. The patient is not nervous/anxious.        Objective:      Physical Exam   Constitutional: He is oriented to person, place, and time. He appears well-developed and well-nourished. No distress.   HENT:   Head: Atraumatic.   Mouth/Throat: No oropharyngeal exudate.   Eyes: Conjunctivae are normal. No scleral icterus.   Cardiovascular: Normal rate, regular rhythm and normal heart sounds.    Pulmonary/Chest: Effort normal and breath sounds normal.   Abdominal: Soft. There is no tenderness.   Musculoskeletal: He exhibits no edema.   Lymphadenopathy:     He has no cervical adenopathy.   Neurological: He is alert and oriented to person, place, and time.   Skin: Skin is warm and dry.   Psychiatric: He has a normal mood and affect. His behavior is normal.       Assessment:       1. Anemia, unspecified type    2. Long-term use of high-risk medication, PPI    3. Screen for colon cancer        Plan:   David was seen today for anemia.    Diagnoses and all orders for this visit:    Anemia, unspecified type  -     POCT Hemocult Stool X3  -     Case request GI: ESOPHAGOGASTRODUODENOSCOPY (EGD)    Long-term use of high-risk medication, PPI  -     Case request GI: ESOPHAGOGASTRODUODENOSCOPY (EGD)    Screen for colon cancer  -     Cancel: Case request GI: COLONOSCOPY

## 2017-02-13 ENCOUNTER — ANESTHESIA (OUTPATIENT)
Dept: ENDOSCOPY | Facility: HOSPITAL | Age: 57
End: 2017-02-13
Payer: COMMERCIAL

## 2017-02-13 ENCOUNTER — HOSPITAL ENCOUNTER (OUTPATIENT)
Facility: HOSPITAL | Age: 57
Discharge: HOME | End: 2017-02-13
Attending: INTERNAL MEDICINE | Admitting: INTERNAL MEDICINE
Payer: COMMERCIAL

## 2017-02-13 ENCOUNTER — ANESTHESIA EVENT (OUTPATIENT)
Dept: ENDOSCOPY | Facility: HOSPITAL | Age: 57
End: 2017-02-13
Payer: COMMERCIAL

## 2017-02-13 ENCOUNTER — SURGERY (OUTPATIENT)
Age: 57
End: 2017-02-13

## 2017-02-13 VITALS
SYSTOLIC BLOOD PRESSURE: 140 MMHG | WEIGHT: 232 LBS | TEMPERATURE: 98 F | BODY MASS INDEX: 36.41 KG/M2 | RESPIRATION RATE: 16 BRPM | OXYGEN SATURATION: 98 % | DIASTOLIC BLOOD PRESSURE: 78 MMHG | HEART RATE: 72 BPM | HEIGHT: 67 IN

## 2017-02-13 DIAGNOSIS — D64.9 ANEMIA, UNSPECIFIED TYPE: ICD-10-CM

## 2017-02-13 DIAGNOSIS — Z12.11 SCREEN FOR COLON CANCER: ICD-10-CM

## 2017-02-13 DIAGNOSIS — R53.83 FATIGUE, UNSPECIFIED TYPE: ICD-10-CM

## 2017-02-13 DIAGNOSIS — D64.9 ANEMIA, UNSPECIFIED TYPE: Primary | ICD-10-CM

## 2017-02-13 DIAGNOSIS — Z79.899 LONG-TERM USE OF HIGH-RISK MEDICATION: Primary | ICD-10-CM

## 2017-02-13 LAB — POCT GLUCOSE: 110 MG/DL (ref 70–110)

## 2017-02-13 PROCEDURE — 25000003 PHARM REV CODE 250: Performed by: NURSE ANESTHETIST, CERTIFIED REGISTERED

## 2017-02-13 PROCEDURE — 37000009 HC ANESTHESIA EA ADD 15 MINS: Performed by: INTERNAL MEDICINE

## 2017-02-13 PROCEDURE — 43239 EGD BIOPSY SINGLE/MULTIPLE: CPT | Mod: 51,,, | Performed by: INTERNAL MEDICINE

## 2017-02-13 PROCEDURE — 25000003 PHARM REV CODE 250: Performed by: INTERNAL MEDICINE

## 2017-02-13 PROCEDURE — 45385 COLONOSCOPY W/LESION REMOVAL: CPT | Mod: ,,, | Performed by: INTERNAL MEDICINE

## 2017-02-13 PROCEDURE — 88305 TISSUE EXAM BY PATHOLOGIST: CPT | Mod: 26,,, | Performed by: PATHOLOGY

## 2017-02-13 PROCEDURE — D9220A PRA ANESTHESIA: Mod: CRNA,,, | Performed by: NURSE ANESTHETIST, CERTIFIED REGISTERED

## 2017-02-13 PROCEDURE — 27201089 HC SNARE, DISP (ANY): Performed by: INTERNAL MEDICINE

## 2017-02-13 PROCEDURE — D9220A PRA ANESTHESIA: Mod: ANES,,, | Performed by: ANESTHESIOLOGY

## 2017-02-13 PROCEDURE — 63600175 PHARM REV CODE 636 W HCPCS: Performed by: NURSE ANESTHETIST, CERTIFIED REGISTERED

## 2017-02-13 PROCEDURE — C1773 RET DEV, INSERTABLE: HCPCS | Performed by: INTERNAL MEDICINE

## 2017-02-13 PROCEDURE — 82962 GLUCOSE BLOOD TEST: CPT | Performed by: INTERNAL MEDICINE

## 2017-02-13 PROCEDURE — 45385 COLONOSCOPY W/LESION REMOVAL: CPT | Performed by: INTERNAL MEDICINE

## 2017-02-13 PROCEDURE — 37000008 HC ANESTHESIA 1ST 15 MINUTES: Performed by: INTERNAL MEDICINE

## 2017-02-13 PROCEDURE — 43239 EGD BIOPSY SINGLE/MULTIPLE: CPT | Performed by: INTERNAL MEDICINE

## 2017-02-13 PROCEDURE — 88305 TISSUE EXAM BY PATHOLOGIST: CPT | Performed by: PATHOLOGY

## 2017-02-13 RX ORDER — LIDOCAINE HCL/PF 100 MG/5ML
SYRINGE (ML) INTRAVENOUS
Status: DISCONTINUED | OUTPATIENT
Start: 2017-02-13 | End: 2017-02-13

## 2017-02-13 RX ORDER — DIVALPROEX SODIUM 500 MG/1
TABLET, FILM COATED, EXTENDED RELEASE ORAL
Qty: 180 TABLET | Refills: 2 | Status: SHIPPED | OUTPATIENT
Start: 2017-02-13 | End: 2017-11-12 | Stop reason: SDUPTHER

## 2017-02-13 RX ORDER — PROPOFOL 10 MG/ML
VIAL (ML) INTRAVENOUS
Status: DISCONTINUED | OUTPATIENT
Start: 2017-02-13 | End: 2017-02-13

## 2017-02-13 RX ORDER — PROPOFOL 10 MG/ML
VIAL (ML) INTRAVENOUS CONTINUOUS PRN
Status: DISCONTINUED | OUTPATIENT
Start: 2017-02-13 | End: 2017-02-13

## 2017-02-13 RX ORDER — SODIUM CHLORIDE 9 MG/ML
INJECTION, SOLUTION INTRAVENOUS CONTINUOUS
Status: DISCONTINUED | OUTPATIENT
Start: 2017-02-13 | End: 2017-02-13 | Stop reason: HOSPADM

## 2017-02-13 RX ADMIN — PROPOFOL 150 MCG/KG/MIN: 10 INJECTION, EMULSION INTRAVENOUS at 09:02

## 2017-02-13 RX ADMIN — PROPOFOL 100 MG: 10 INJECTION, EMULSION INTRAVENOUS at 09:02

## 2017-02-13 RX ADMIN — PROPOFOL 60 MG: 10 INJECTION, EMULSION INTRAVENOUS at 09:02

## 2017-02-13 RX ADMIN — LIDOCAINE HYDROCHLORIDE 100 MG: 20 INJECTION, SOLUTION INTRAVENOUS at 09:02

## 2017-02-13 RX ADMIN — SODIUM CHLORIDE: 0.9 INJECTION, SOLUTION INTRAVENOUS at 08:02

## 2017-02-13 NOTE — DISCHARGE INSTRUCTIONS

## 2017-02-13 NOTE — IP AVS SNAPSHOT
[facility]  [address]  [facility] [address]  Phone: [phone]           Patient Discharge Instructions     Our goal is to set you up for success. This packet includes information on your condition, medications, and your home care. It will help you to care for yourself so you don't get sicker and need to go back to the hospital.     Please ask your nurse if you have any questions.        There are many details to remember when preparing to leave the hospital. Here is what you will need to do:    1. Take your medicine. If you are prescribed medications, review your Medication List in the following pages. You may have new medications to  at the pharmacy and others that you'll need to stop taking. Review the instructions for how and when to take your medications. Talk with your doctor or nurses if you are unsure of what to do.     2. Go to your follow-up appointments. Specific follow-up information is listed in the following pages. Your may be contacted by a transition nurse or clinical provider about future appointments. Be sure we have all of the phone numbers to reach you, if needed. Please contact your provider's office if you are unable to make an appointment.     3. Watch for warning signs. Your doctor or nurse will give you detailed warning signs to watch for and when to call for assistance. These instructions may also include educational information about your condition. If you experience any of warning signs to your health, call your doctor.               [facility]  Unless otherwise directed by your provider, please contact [facility], our nurse care line that is available for 24/7 assistance.     [phone] (toll-free)    Registered nurses in the [facility] provide clinical advisement, health education, appointment booking, and other advisory services.                    ** Verify the list of medication(s) below is accurate and up  to date. Carry this with you in case of emergency. If your medications have changed, please notify your healthcare provider.             Medication List      CONTINUE taking these medications        Additional Info                      atorvastatin 80 MG tablet   Commonly known as:  LIPITOR   Quantity:  90 tablet   Refills:  3   Dose:  80 mg    Instructions:  Take 1 tablet (80 mg total) by mouth nightly.     Begin Date    AM    Noon    PM    Bedtime       butalbital-acetaminophen-caffeine -40 mg -40 mg per tablet   Commonly known as:  FIORICET, ESGIC   Quantity:  120 tablet   Refills:  2   Dose:  1 tablet    Instructions:  Take 1 tablet by mouth every 4 (four) hours as needed for Pain.     Begin Date    AM    Noon    PM    Bedtime       divalproex 500 MG Tb24   Commonly known as:  DEPAKOTE   Quantity:  90 tablet   Refills:  3   Dose:  500 mg    Instructions:  Take 1 tablet (500 mg total) by mouth every morning.     Begin Date    AM    Noon    PM    Bedtime       fluoxetine 40 MG capsule   Commonly known as:  PROZAC   Quantity:  90 capsule   Refills:  3   Dose:  40 mg    Instructions:  Take 1 capsule (40 mg total) by mouth every morning.     Begin Date    AM    Noon    PM    Bedtime       fluticasone 50 mcg/actuation nasal spray   Commonly known as:  FLONASE   Quantity:  48 g   Refills:  3    Instructions:  USE 2 SPRAYS IN EACH NOSTRIL ONCE DAILY     Begin Date    AM    Noon    PM    Bedtime       metformin 500 MG 24 hr tablet   Commonly known as:  GLUCOPHAGE-XR   Quantity:  180 tablet   Refills:  3   Dose:  500 mg    Instructions:  Take 1 tablet (500 mg total) by mouth 2 (two) times daily with meals.     Begin Date    AM    Noon    PM    Bedtime       omeprazole 20 MG capsule   Commonly known as:  PRILOSEC   Quantity:  90 capsule   Refills:  3   Dose:  20 mg    Instructions:  Take 1 capsule (20 mg total) by mouth every morning.     Begin Date    AM    Noon    PM    Bedtime       ondansetron 8 MG Tbdl    Commonly known as:  ZOFRAN-ODT   Quantity:  30 tablet   Refills:  2   Dose:  8 mg    Instructions:  Take 1 tablet (8 mg total) by mouth every 8 (eight) hours as needed.     Begin Date    AM    Noon    PM    Bedtime       ONE DAILY MULTIVITAMIN per tablet   Refills:  0   Dose:  1 tablet   Generic drug:  multivitamin    Instructions:  Take 1 tablet by mouth once daily.     Begin Date    AM    Noon    PM    Bedtime       rizatriptan 10 MG tablet   Commonly known as:  MAXALT   Quantity:  18 tablet   Refills:  6   Dose:  10 mg   Comments:  Please provide the maximum number of tablets allowed by the insurance company    Instructions:  Take 1 tablet (10 mg total) by mouth once as needed for Migraine. Only one tablet per 24 hours     Begin Date    AM    Noon    PM    Bedtime       SAW PALMETTO ORAL   Refills:  0    Instructions:  Take by mouth.     Begin Date    AM    Noon    PM    Bedtime       sildenafil 100 MG tablet   Commonly known as:  VIAGRA   Quantity:  9 tablet   Refills:  5   Dose:  100 mg    Instructions:  Take 1 tablet (100 mg total) by mouth daily as needed for Erectile Dysfunction.     Begin Date    AM    Noon    PM    Bedtime       tizanidine 2 MG tablet   Commonly known as:  ZANAFLEX   Quantity:  60 tablet   Refills:  5    Instructions:  1-2 pills around bedtime as needed for headache or neck pain     Begin Date    AM    Noon    PM    Bedtime       trazodone 50 MG tablet   Commonly known as:  DESYREL   Quantity:  90 tablet   Refills:  3   Dose:  50 mg    Instructions:  Take 1 tablet (50 mg total) by mouth nightly as needed for Insomnia.     Begin Date    AM    Noon    PM    Bedtime       verapamil 240 MG C24p   Commonly known as:  VERELAN   Quantity:  90 capsule   Refills:  3   Dose:  240 mg    Instructions:  Take 1 capsule (240 mg total) by mouth daily as needed. Blood pressure control and migraine prevention     Begin Date    AM    Noon    PM    Bedtime       VITAMIN B-12 ORAL   Refills:  0     Instructions:  Take by mouth.     Begin Date    AM    Noon    PM    Bedtime       VITAMIN C 100 MG tablet   Refills:  0   Dose:  100 mg   Generic drug:  ascorbic acid (vitamin C)    Instructions:  Take 100 mg by mouth once daily.     Begin Date    AM    Noon    PM    Bedtime                  Please bring to all follow up appointments:    1. A copy of your discharge instructions.  2. All medicines you are currently taking in their original bottles.  3. Identification and insurance card.    Please arrive 15 minutes ahead of scheduled appointment time.    Please call 24 hours in advance if you must reschedule your appointment and/or time.          Discharge Instructions     Future Orders    Diet general     Questions:    Total calories:      Fat restriction, if any:      Protein restriction, if any:      Na restriction, if any:      Fluid restriction:      Additional restrictions:          Discharge Instructions         Colonoscopy     A camera attached to a flexible tube with a viewing lens is used to take video pictures.     Colonoscopy is a test to view the inside of your lower digestive tract (colon and rectum). Sometimes it can show the last part of the small intestine (ileum). During the test, small pieces of tissue may be removed for testing. This is called a biopsy. Small growths, such as polyps, may also be removed.   Why is colonoscopy done?  The test is done to help look for colon cancer. And it can help find the source of abdominal pain, bleeding, and changes in bowel habits. It may be needed once a year, depending on factors such as your:  · Age  · Health history  · Family health history  · Symptoms  · Results from any prior colonoscopy  Risks and possible complications  These include:  · Bleeding               · A puncture or tear in the colon   · Risks of anesthesia  · A cancer lesion not being seen  Getting ready   To prepare for the test:  · Talk with your healthcare provider about the risks of the  test (see below). Also ask your healthcare provider about alternatives to the test.  · Tell your healthcare provider about any medicines you take. Also tell him or her about any health conditions you may have.  · Make sure your rectum and colon are empty for the test. Follow the diet and bowel prep instructions exactly. If you dont, the test may need to be rescheduled.  · Plan for a friend or family member to drive you home after the test.     Colonoscopy provides an inside view of the entire colon.     You may discuss the results with your doctor right away or at a future visit.  During the test   The test is usually done in the hospital on an outpatient basis. This means you go home the same day. The procedure takes about 30 minutes. During that time:  · You are given relaxing (sedating) medicine through an IV line. You may be drowsy, or fully asleep.  · The healthcare provider will first give you a physical exam to check for anal and rectal problems.  · Then the anus is lubricated and the scope inserted.  · If you are awake, you may have a feeling similar to needing to have a bowel movement. You may also feel pressure as air is pumped into the colon. Its OK to pass gas during the procedure.  · Biopsy, polyp removal, or other treatments may be done during the test.  After the test   You may have gas right after the test. It can help to try to pass it to help prevent later bloating. Your healthcare provider may discuss the results with you right away. Or you may need to schedule a follow-up visit to talk about the results. After the test, you can go back to your normal eating and other activities. You may be tired from the sedation and need to rest for a few hours.  Date Last Reviewed: 11/1/2016  © 5896-0726 [employer]. [address] [place] [address], [place] [address]. All rights reserved. This information is not intended as a substitute for professional medical care. Always follow your healthcare  professional's instructions.        Upper GI Endoscopy     During endoscopy, a long, flexible tube is used to view the inside of your upper GI tract.      Upper GI endoscopy allows your healthcare provider to look directly into the beginning of your gastrointestinal (GI) tract. The esophagus, stomach, and duodenum (the first part of the small intestine) make up the upper GI tract.   Before the exam  Follow these and any other instructions you are given before your endoscopy. If you dont follow the healthcare providers instructions carefully, the test may need to be canceled or done over:  · Don't eat or drink anything after midnight the night before your exam. If your exam is in the afternoon, drink only clear liquids in the morning. Don't eat or drink anything for 8 hours before the exam. In some cases, you may be able to take medicines with sips of water until 2 hours before the procedure. Speak with your healthcare provider about this.   · Bring your X-rays and any other test results you have.  · Because you will be sedated, arrange for an adult to drive you home after the exam.  · Tell your healthcare provider before the exam if you are taking any medicines or have any medical problems.  The procedure  Here is what to expect:  · You will lie on the endoscopy table. Usually patients lie on the left side.  · You will be monitored and given oxygen.  · Your throat may be numbed with a spray or gargle. You are given medicine through an intravenous (IV) line that will help you relax and remain comfortable. You may be awake or asleep during the procedure.  · The healthcare provider will put the endoscope in your mouth and down your esophagus. It is thinner than most pieces of food that you swallow. It will not affect your breathing. The medicine helps keep you from gagging.  · Air is put into your GI tract to expand it. It can make you burp.  · During the procedure, the healthcare provider can take biopsies (tissue  "samples), remove abnormalities, such as polyps, or treat abnormalities through a variety of devices placed through the endoscope. You will not feel this.   · The endoscope carries images of your upper GI tract to a video screen. If you are awake, you may be able to look at the images.  · After the procedure is done, you will rest for a time. An adult must drive you home.  When to call your healthcare provider  Contact your healthcare provider if you have:  · Black or tarry stools, or blood in your stool  · Fever  · Pain in your belly that does not go away  · Nausea and vomiting, or vomiting blood   Date Last Reviewed: 7/1/2016  © 4774-0582 [employer]. [address] [place] [address], [place] [address]. All rights reserved. This information is not intended as a substitute for professional medical care. Always follow your healthcare professional's instructions.            Admission Information     Date & Time Provider Department CSN    2/13/2017  8:07 AM Rich James MD [facility] [id number]      Care Providers     Provider Role Specialty Primary office phone    Rich James MD Attending Provider Gastroenterology [phone]    Rich James MD Surgeon  Gastroenterology [phone]      Your Vitals Were     BP Pulse Temp Resp Height Weight    146/82 87 97.6 °F (36.4 °C) (Oral) 16 5' 7" (1.702 m) 105.2 kg (232 lb)    SpO2 BMI             98% 36.34 kg/m2         Recent Lab Values        5/2/2015 11/21/2015 1/21/2017                     8:22 AM  8:00 AM  8:11 AM         A1C 5.9 5.6 6.0         Comment for A1C at  8:11 AM on 1/21/2017:  According to ADA guidelines, hemoglobin A1C <7.0% represents  optimal control in non-pregnant diabetic patients.  Different  metrics may apply to specific populations.   Standards of Medical Care in Diabetes - 2016.  For the purpose of screening for the presence of diabetes:  <5.7%     Consistent with the absence of diabetes  5.7-6.4%  Consistent with increasing risk for " diabetes   (prediabetes)  >or=6.5%  Consistent with diabetes  Currently no consensus exists for use of hemoglobin A1C  for diagnosis of diabetes for children.        Pending Labs     Order Current Status    Specimen to Pathology - Surgery Collected (02/13/17 0947)      Allergies as of 2/13/2017     No Known Allergies      Advance Directives     An advance directive is a document which, in the event you are no longer able to make decisions for yourself, tells your healthcare team what kind of treatment you do or do not want to receive, or who you would like to make those decisions for you.  If you do not currently have an advance directive, [facility] encourages you to create one.  For more information call:  [phone] ([phone]), [phone] ([phone]),  or log on to [email or web address].        Language Assistance Services     ATTENTION: Language assistance services are available, free of charge. Please call [phone].      ATENCIÓN: Si habla español, tiene a carlson disposición servicios gratuitos de asistencia lingüística. Llame al [phone].     [facility] Ý: N?u b?n nói Ti?ng Vi?t, có các d?ch v? h? tr? ngôn ng? mi?n phí dành cho b?n. G?i s? [phone].         [facility] complies with applicable Federal civil rights laws and does not discriminate on the basis of race, color, national origin, age, disability, or sex.

## 2017-02-13 NOTE — INTERVAL H&P NOTE
The patient has been examined and the H&P has been reviewed:    I concur with the findings and no changes have occurred since H&P was written.     History and Exam unchanged from visit. Stool hemoccult just turned in over the weekend, not resulted yet    Procedure - EGD/Colon  Neck - supple  Plan of anesthesia - General  ASA - per anesthesia  Mallampati - per anesthesia  Anesthesia problems - no  Family history of anesthesia problems - no      Anesthesia/Surgery risks, benefits and alternative options discussed and understood by patient/family.          Active Hospital Problems    Diagnosis  POA    Long-term use of high-risk medication, PPI [Z79.899]  Not Applicable      Resolved Hospital Problems    Diagnosis Date Resolved POA   No resolved problems to display.

## 2017-02-13 NOTE — ANESTHESIA PREPROCEDURE EVALUATION
02/13/2017  David Nicole Jr. is a 56 y.o., male.    [facility] Anesthesia Evaluation    I have reviewed the Patient Summary Reports.    I have reviewed the Nursing Notes.   I have reviewed the Medications.     Review of Systems  Anesthesia Hx:  No problems with previous Anesthesia  History of prior surgery of interest to airway management or planning: Previous anesthesia: General  Denies Personal Hx of Anesthesia complications.   Cardiovascular:   Hypertension    Pulmonary:  Pulmonary Normal    Renal/:  Renal/ Normal     Hepatic/GI:  Hepatic/GI Normal    Neurological:   Headaches    Endocrine:  Endocrine Normal    Psych:   Psychiatric History anxiety        Patient Active Problem List   Diagnosis    HTN (hypertension)    Hyperlipidemia, mixed    Glucose intolerance (impaired glucose tolerance)    Migraine headache    Intractable migraine with aura with status migrainosus    High risk medication use    Morbid obesity    Screening    Screen for colon cancer    Screening for prostate cancer    Need for Tdap vaccination    Anemia    Long-term use of high-risk medication, PPI     Past Medical History   Diagnosis Date    Anxiety     Insomnia     Joint pain     Knee pain     Prediabetes      Past Surgical History   Procedure Laterality Date    Back surgery      Tonsillectomy      [place] tooth extraction      Vasectomy           Physical Exam  General:  Obesity    Airway/Jaw/Neck:  Airway Findings: Mouth Opening: Normal Tongue: Normal  General Airway Assessment: Adult  Mallampati: II  TM Distance: Normal, at least 6 cm  Jaw/Neck Findings:  Neck ROM: Normal ROM      Dental:  Dental Findings: In tact   Chest/Lungs:  Chest/Lungs Findings: Clear to auscultation     Heart/Vascular:  Heart Findings: Rate: Normal  Rhythm: Regular Rhythm  Sounds: Normal        Mental Status:  Mental Status Findings:   Cooperative, Alert and Oriented         Anesthesia Plan  Type of Anesthesia, risks & benefits discussed:  Anesthesia Type:  general  Patient's Preference: general  Intra-op Monitoring Plan:   Intra-op Monitoring Plan Comments:   Post Op Pain Control Plan:   Post Op Pain Control Plan Comments:   Induction:   IV  Beta Blocker:  Patient is not currently on a Beta-Blocker (No further documentation required).       Informed Consent: Patient understands risks and agrees with Anesthesia plan.  Questions answered. Anesthesia consent signed with patient.  ASA Score: 2     Day of Surgery Review of History & Physical:    H&P update referred to the surgeon.         Ready For Surgery From Anesthesia Perspective.

## 2017-02-13 NOTE — TRANSFER OF CARE
"Anesthesia Transfer of Care Note    Patient: David Nicole Jr.    Procedure(s) Performed: Procedure(s) (LRB):  ESOPHAGOGASTRODUODENOSCOPY (EGD) (N/A)  COLONOSCOPY (N/A)    Patient location: GI    Anesthesia Type: general    Transport from OR: Transported from OR on room air with adequate spontaneous ventilation    Post pain: adequate analgesia    Post assessment: no apparent anesthetic complications and tolerated procedure well    Post vital signs: stable    Level of consciousness: awake, alert and oriented    Nausea/Vomiting: no nausea/vomiting    Complications: none          Last vitals:   Visit Vitals    BP (!) 146/62 (BP Location: Left arm, Patient Position: Lying, BP Method: Automatic)    Pulse 93    Temp 36.4 °C (97.6 °F) (Oral)    Resp 16    Ht 5' 7" (1.702 m)    Wt 105.2 kg (232 lb)    SpO2 95%    BMI 36.34 kg/m2     "

## 2017-02-13 NOTE — ANESTHESIA POSTPROCEDURE EVALUATION
"Anesthesia Post Evaluation    Patient: David Nicole JrJeffery    Procedure(s) Performed: Procedure(s) (LRB):  ESOPHAGOGASTRODUODENOSCOPY (EGD) (N/A)  COLONOSCOPY (N/A)    Final Anesthesia Type: general  Patient location during evaluation: PACU  Patient participation: Yes- Able to Participate  Level of consciousness: awake and alert and oriented  Post-procedure vital signs: reviewed and stable  Pain management: adequate  Airway patency: patent  PONV status at discharge: No PONV  Anesthetic complications: no      Cardiovascular status: hemodynamically stable  Respiratory status: unassisted, spontaneous ventilation and room air  Hydration status: euvolemic  Follow-up not needed.        Visit Vitals    BP (!) 140/78    Pulse 72    Temp 36.4 °C (97.6 °F)    Resp 16    Ht 5' 7" (1.702 m)    Wt 105.2 kg (232 lb)    SpO2 98%    BMI 36.34 kg/m2       Pain/Alessio Score: Pain Assessment Performed: Yes (2/13/2017 10:08 AM)  Presence of Pain: denies (2/13/2017 10:23 AM)  Alessio Score: 10 (2/13/2017 10:23 AM)      "

## 2017-02-14 ENCOUNTER — LAB VISIT (OUTPATIENT)
Dept: LAB | Facility: HOSPITAL | Age: 57
End: 2017-02-14
Attending: INTERNAL MEDICINE
Payer: COMMERCIAL

## 2017-02-14 ENCOUNTER — PATIENT MESSAGE (OUTPATIENT)
Dept: INTERNAL MEDICINE | Facility: CLINIC | Age: 57
End: 2017-02-14

## 2017-02-14 DIAGNOSIS — D64.9 ANEMIA, UNSPECIFIED TYPE: ICD-10-CM

## 2017-02-14 PROCEDURE — 82272 OCCULT BLD FECES 1-3 TESTS: CPT | Mod: 91

## 2017-02-15 LAB
OB PNL STL: NEGATIVE
OB PNL STL: NEGATIVE
OB PNL STL: POSITIVE

## 2017-02-15 NOTE — TELEPHONE ENCOUNTER
The patient is feeling fatigued.  He is awaiting the results of biopsies taken from a colonoscopy and EGD, Which were performed yesterday, Because of anemia.  I have ordered blood tests to recheck his blood work.  Please schedule the blood tests.  He may come over to the primary care wellness Center to get the blood work drawn tomorrow morning.

## 2017-02-16 ENCOUNTER — LAB VISIT (OUTPATIENT)
Dept: LAB | Facility: HOSPITAL | Age: 57
End: 2017-02-16
Attending: INTERNAL MEDICINE
Payer: COMMERCIAL

## 2017-02-16 ENCOUNTER — PATIENT MESSAGE (OUTPATIENT)
Dept: GASTROENTEROLOGY | Facility: CLINIC | Age: 57
End: 2017-02-16

## 2017-02-16 DIAGNOSIS — R53.83 FATIGUE, UNSPECIFIED TYPE: ICD-10-CM

## 2017-02-16 DIAGNOSIS — D64.9 ANEMIA, UNSPECIFIED TYPE: ICD-10-CM

## 2017-02-16 LAB
BASOPHILS # BLD AUTO: 0.02 K/UL
BASOPHILS NFR BLD: 0.2 %
DIFFERENTIAL METHOD: ABNORMAL
EOSINOPHIL # BLD AUTO: 0 K/UL
EOSINOPHIL NFR BLD: 0.5 %
ERYTHROCYTE [DISTWIDTH] IN BLOOD BY AUTOMATED COUNT: 16.6 %
FERRITIN SERPL-MCNC: 3 NG/ML
HCT VFR BLD AUTO: 33.1 %
HGB BLD-MCNC: 9.9 G/DL
IRON SERPL-MCNC: 16 UG/DL
LYMPHOCYTES # BLD AUTO: 2 K/UL
LYMPHOCYTES NFR BLD: 24.4 %
MCH RBC QN AUTO: 21.4 PG
MCHC RBC AUTO-ENTMCNC: 29.9 %
MCV RBC AUTO: 72 FL
MONOCYTES # BLD AUTO: 1 K/UL
MONOCYTES NFR BLD: 11.8 %
NEUTROPHILS # BLD AUTO: 5.2 K/UL
NEUTROPHILS NFR BLD: 62.7 %
PLATELET # BLD AUTO: 364 K/UL
PMV BLD AUTO: 10.2 FL
RBC # BLD AUTO: 4.63 M/UL
SATURATED IRON: 4 %
TOTAL IRON BINDING CAPACITY: 447 UG/DL
TRANSFERRIN SERPL-MCNC: 302 MG/DL
WBC # BLD AUTO: 8.28 K/UL

## 2017-02-16 PROCEDURE — 83540 ASSAY OF IRON: CPT

## 2017-02-16 PROCEDURE — 85025 COMPLETE CBC W/AUTO DIFF WBC: CPT

## 2017-02-16 PROCEDURE — 82728 ASSAY OF FERRITIN: CPT

## 2017-02-16 PROCEDURE — 84165 PROTEIN E-PHORESIS SERUM: CPT

## 2017-02-16 PROCEDURE — 84165 PROTEIN E-PHORESIS SERUM: CPT | Mod: 26,,, | Performed by: PATHOLOGY

## 2017-02-16 PROCEDURE — 36415 COLL VENOUS BLD VENIPUNCTURE: CPT

## 2017-02-17 ENCOUNTER — TELEPHONE (OUTPATIENT)
Dept: INTERNAL MEDICINE | Facility: CLINIC | Age: 57
End: 2017-02-17

## 2017-02-17 ENCOUNTER — PATIENT MESSAGE (OUTPATIENT)
Dept: INTERNAL MEDICINE | Facility: CLINIC | Age: 57
End: 2017-02-17

## 2017-02-17 DIAGNOSIS — R73.02 GLUCOSE INTOLERANCE (IMPAIRED GLUCOSE TOLERANCE): ICD-10-CM

## 2017-02-17 DIAGNOSIS — E61.1 IRON DEFICIENCY: ICD-10-CM

## 2017-02-17 DIAGNOSIS — D64.9 ANEMIA, UNSPECIFIED TYPE: Primary | ICD-10-CM

## 2017-02-17 LAB
ALBUMIN SERPL ELPH-MCNC: 3.58 G/DL
ALPHA1 GLOB SERPL ELPH-MCNC: 0.36 G/DL
ALPHA2 GLOB SERPL ELPH-MCNC: 0.78 G/DL
B-GLOBULIN SERPL ELPH-MCNC: 0.86 G/DL
GAMMA GLOB SERPL ELPH-MCNC: 0.62 G/DL
PROT SERPL-MCNC: 6.2 G/DL

## 2017-02-17 RX ORDER — FERROUS SULFATE 325(65) MG
325 TABLET ORAL
Refills: 0
Start: 2017-02-17 | End: 2019-05-02

## 2017-02-20 LAB — PATHOLOGIST INTERPRETATION SPE: NORMAL

## 2017-04-01 ENCOUNTER — LAB VISIT (OUTPATIENT)
Dept: LAB | Facility: HOSPITAL | Age: 57
End: 2017-04-01
Attending: INTERNAL MEDICINE
Payer: COMMERCIAL

## 2017-04-01 DIAGNOSIS — E61.1 IRON DEFICIENCY: ICD-10-CM

## 2017-04-01 DIAGNOSIS — D64.9 ANEMIA, UNSPECIFIED TYPE: ICD-10-CM

## 2017-04-01 DIAGNOSIS — R73.02 GLUCOSE INTOLERANCE (IMPAIRED GLUCOSE TOLERANCE): ICD-10-CM

## 2017-04-01 LAB
BASOPHILS # BLD AUTO: 0.05 K/UL
BASOPHILS NFR BLD: 0.7 %
DIFFERENTIAL METHOD: ABNORMAL
EOSINOPHIL # BLD AUTO: 0.1 K/UL
EOSINOPHIL NFR BLD: 1.5 %
ERYTHROCYTE [DISTWIDTH] IN BLOOD BY AUTOMATED COUNT: 21 %
FERRITIN SERPL-MCNC: 6 NG/ML
HCT VFR BLD AUTO: 41 %
HGB BLD-MCNC: 11.9 G/DL
LYMPHOCYTES # BLD AUTO: 1.9 K/UL
LYMPHOCYTES NFR BLD: 27.6 %
MCH RBC QN AUTO: 22 PG
MCHC RBC AUTO-ENTMCNC: 29 %
MCV RBC AUTO: 76 FL
MONOCYTES # BLD AUTO: 0.8 K/UL
MONOCYTES NFR BLD: 11 %
NEUTROPHILS # BLD AUTO: 4.1 K/UL
NEUTROPHILS NFR BLD: 58.9 %
PLATELET # BLD AUTO: 287 K/UL
PMV BLD AUTO: 10.3 FL
RBC # BLD AUTO: 5.42 M/UL
WBC # BLD AUTO: 6.89 K/UL

## 2017-04-01 PROCEDURE — 36415 COLL VENOUS BLD VENIPUNCTURE: CPT

## 2017-04-01 PROCEDURE — 82728 ASSAY OF FERRITIN: CPT

## 2017-04-01 PROCEDURE — 85025 COMPLETE CBC W/AUTO DIFF WBC: CPT

## 2017-04-01 PROCEDURE — 83036 HEMOGLOBIN GLYCOSYLATED A1C: CPT

## 2017-04-03 LAB
ESTIMATED AVG GLUCOSE: 111 MG/DL
HBA1C MFR BLD HPLC: 5.5 %

## 2017-08-16 ENCOUNTER — OFFICE VISIT (OUTPATIENT)
Dept: INTERNAL MEDICINE | Facility: CLINIC | Age: 57
End: 2017-08-16
Payer: COMMERCIAL

## 2017-08-16 VITALS
WEIGHT: 235.25 LBS | HEIGHT: 67 IN | DIASTOLIC BLOOD PRESSURE: 70 MMHG | TEMPERATURE: 98 F | HEART RATE: 88 BPM | SYSTOLIC BLOOD PRESSURE: 148 MMHG | BODY MASS INDEX: 36.92 KG/M2 | OXYGEN SATURATION: 98 %

## 2017-08-16 DIAGNOSIS — R07.89 CHEST TIGHTNESS: ICD-10-CM

## 2017-08-16 DIAGNOSIS — J02.9 SORE THROAT: Primary | ICD-10-CM

## 2017-08-16 LAB — DEPRECATED S PYO AG THROAT QL EIA: NEGATIVE

## 2017-08-16 PROCEDURE — 96372 THER/PROPH/DIAG INJ SC/IM: CPT | Mod: S$GLB,,, | Performed by: INTERNAL MEDICINE

## 2017-08-16 PROCEDURE — 3078F DIAST BP <80 MM HG: CPT | Mod: S$GLB,,, | Performed by: INTERNAL MEDICINE

## 2017-08-16 PROCEDURE — 99213 OFFICE O/P EST LOW 20 MIN: CPT | Mod: 25,S$GLB,, | Performed by: INTERNAL MEDICINE

## 2017-08-16 PROCEDURE — 3077F SYST BP >= 140 MM HG: CPT | Mod: S$GLB,,, | Performed by: INTERNAL MEDICINE

## 2017-08-16 PROCEDURE — 3008F BODY MASS INDEX DOCD: CPT | Mod: S$GLB,,, | Performed by: INTERNAL MEDICINE

## 2017-08-16 PROCEDURE — 87081 CULTURE SCREEN ONLY: CPT

## 2017-08-16 PROCEDURE — 94640 AIRWAY INHALATION TREATMENT: CPT | Mod: 59,S$GLB,, | Performed by: INTERNAL MEDICINE

## 2017-08-16 PROCEDURE — 99999 PR PBB SHADOW E&M-EST. PATIENT-LVL III: CPT | Mod: PBBFAC,,, | Performed by: INTERNAL MEDICINE

## 2017-08-16 PROCEDURE — 87880 STREP A ASSAY W/OPTIC: CPT

## 2017-08-16 RX ORDER — IPRATROPIUM BROMIDE AND ALBUTEROL SULFATE 2.5; .5 MG/3ML; MG/3ML
3 SOLUTION RESPIRATORY (INHALATION)
Status: COMPLETED | OUTPATIENT
Start: 2017-08-16 | End: 2017-08-16

## 2017-08-16 RX ORDER — TRIAMCINOLONE ACETONIDE 40 MG/ML
40 INJECTION, SUSPENSION INTRA-ARTICULAR; INTRAMUSCULAR
Status: COMPLETED | OUTPATIENT
Start: 2017-08-16 | End: 2017-08-16

## 2017-08-16 RX ADMIN — IPRATROPIUM BROMIDE AND ALBUTEROL SULFATE 3 ML: 2.5; .5 SOLUTION RESPIRATORY (INHALATION) at 02:08

## 2017-08-16 RX ADMIN — TRIAMCINOLONE ACETONIDE 40 MG: 40 INJECTION, SUSPENSION INTRA-ARTICULAR; INTRAMUSCULAR at 02:08

## 2017-08-16 NOTE — PROGRESS NOTES
"Subjective:       Patient ID: David Nicole Jr. is a 57 y.o. male.    Chief Complaint: Sore Throat    HPI     Patient is a 57 year old male here today for possible strep throat.  Says that about 3 days ago he was working (as a ) and put toilet bowl  in same toilet that already had bleach in it causing a sudden chemical reaction with fumes/vapors that caused him to feel sick about 1.5 hours after inhalation. He describes a "typical migraine" and nausea and he went home and experienced chest tightness and coughing spells. He says that he went to see a workman's comp physician as told by his supervisor who ran EKG, CXR which he was told were normal except a strep throat swab was abnormal. He was told to come to [facility] for evaluation for strep throat.  Today his throat is sore. No cough. Chest tightness is improved but still present which he thinks is from the coughing spells on day 1 of the exposure. No fever/chills. No MCPHERSON. No numbness/weakness on either side of body. No vision change. His migraine is throbbing located anterior and posterior head which is typical for him. He skipped his migraine medication since the exposure occurred as he was worried about more chemical reactions.     Review of Systems   Constitutional: Negative for chills, fatigue and fever.   HENT: Positive for sore throat. Negative for congestion, postnasal drip, rhinorrhea, trouble swallowing and voice change.    Respiratory: Positive for chest tightness. Negative for shortness of breath, wheezing and stridor.    Cardiovascular: Negative for chest pain, palpitations and leg swelling.   Neurological: Positive for headaches.       Objective:      Physical Exam   Constitutional: He is oriented to person, place, and time. He appears well-developed and well-nourished. No distress.   HENT:   Head: Normocephalic and atraumatic.   Mouth/Throat: Oropharynx is clear and moist. No oropharyngeal exudate.   Eyes: Conjunctivae and EOM are " normal. Pupils are equal, round, and reactive to light. Right eye exhibits no discharge. Left eye exhibits no discharge.   Neck: Normal range of motion. Neck supple. No thyromegaly present.   Cardiovascular: Normal rate, regular rhythm and normal heart sounds.    No murmur heard.  Pulmonary/Chest: Effort normal and breath sounds normal. No respiratory distress. He has no wheezes. He has no rales.   Musculoskeletal: He exhibits no edema.   Lymphadenopathy:     He has no cervical adenopathy.   Neurological: He is alert and oriented to person, place, and time.   Skin: Skin is warm and dry. He is not diaphoretic.   Psychiatric: He has a normal mood and affect. His behavior is normal.   Nursing note and vitals reviewed.      Assessment:       1. Sore throat    2. Chest tightness        Plan:       Sounds like mucosal irritation from a chemical exposure that he inhaled > 48 hours  Airway is open, no stridor  duoneb x 1 now to see if this helps with the residual chest tightness, EKG reviewed NSR with no ST elevation  Kenalog 40 mg IM x 1 to help with sore throat  CXR and EKG completed by workman's comp  Strep throat swab taken today  RTC PRN ; however if sx worsen, please go to ED for evaluation

## 2017-08-18 ENCOUNTER — TELEPHONE (OUTPATIENT)
Dept: INTERNAL MEDICINE | Facility: CLINIC | Age: 57
End: 2017-08-18

## 2017-08-18 NOTE — TELEPHONE ENCOUNTER
----- Message from Belinda Epstein MA sent at 8/18/2017  8:31 AM CDT -----  Contact: self - [phone]  Type: Test Results    What test was performed? Strep     Who ordered the test?    When and where were the test performed?  8/16/17    Comments: Patient stated he would like to go back to work today. Please call. Thanks!

## 2017-08-18 NOTE — TELEPHONE ENCOUNTER
Informed pt strep results are not back yet. 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 Informed pt that we will contact him as soon as his results are back. Pt verbalized understanding.   HERMINIA Dc

## 2017-08-19 LAB — BACTERIA THROAT CULT: NORMAL

## 2017-11-13 RX ORDER — DIVALPROEX SODIUM 500 MG/1
TABLET, FILM COATED, EXTENDED RELEASE ORAL
Qty: 180 TABLET | Refills: 2 | Status: SHIPPED | OUTPATIENT
Start: 2017-11-13 | End: 2018-02-27 | Stop reason: SDUPTHER

## 2017-12-07 RX ORDER — FLUTICASONE PROPIONATE 50 MCG
SPRAY, SUSPENSION (ML) NASAL
Qty: 48 G | Refills: 3 | Status: SHIPPED | OUTPATIENT
Start: 2017-12-07 | End: 2021-04-23 | Stop reason: SDUPTHER

## 2017-12-25 RX ORDER — OMEPRAZOLE 20 MG/1
CAPSULE, DELAYED RELEASE ORAL
Qty: 90 CAPSULE | Refills: 3 | Status: SHIPPED | OUTPATIENT
Start: 2017-12-25 | End: 2018-02-27 | Stop reason: SDUPTHER

## 2018-02-10 ENCOUNTER — TELEPHONE (OUTPATIENT)
Dept: INTERNAL MEDICINE | Facility: CLINIC | Age: 58
End: 2018-02-10

## 2018-02-12 RX ORDER — FLUOXETINE HYDROCHLORIDE 40 MG/1
CAPSULE ORAL
Qty: 90 CAPSULE | Refills: 3 | Status: SHIPPED | OUTPATIENT
Start: 2018-02-12 | End: 2018-02-27 | Stop reason: SDUPTHER

## 2018-02-12 RX ORDER — ATORVASTATIN CALCIUM 80 MG/1
TABLET, FILM COATED ORAL
Qty: 90 TABLET | Refills: 3 | Status: SHIPPED | OUTPATIENT
Start: 2018-02-12 | End: 2018-02-27 | Stop reason: SDUPTHER

## 2018-02-12 RX ORDER — VERAPAMIL HYDROCHLORIDE 240 MG/1
CAPSULE, EXTENDED RELEASE ORAL
Qty: 90 CAPSULE | Refills: 3 | Status: SHIPPED | OUTPATIENT
Start: 2018-02-12 | End: 2018-02-27 | Stop reason: SDUPTHER

## 2018-02-21 ENCOUNTER — TELEPHONE (OUTPATIENT)
Dept: INTERNAL MEDICINE | Facility: CLINIC | Age: 58
End: 2018-02-21

## 2018-02-21 DIAGNOSIS — Z79.899 HIGH RISK MEDICATION USE: ICD-10-CM

## 2018-02-21 DIAGNOSIS — R73.02 GLUCOSE INTOLERANCE (IMPAIRED GLUCOSE TOLERANCE): ICD-10-CM

## 2018-02-21 DIAGNOSIS — E78.2 HYPERLIPIDEMIA, MIXED: ICD-10-CM

## 2018-02-21 DIAGNOSIS — I10 ESSENTIAL HYPERTENSION: ICD-10-CM

## 2018-02-21 DIAGNOSIS — E61.1 IRON DEFICIENCY: ICD-10-CM

## 2018-02-21 DIAGNOSIS — D64.9 ANEMIA, UNSPECIFIED TYPE: Primary | ICD-10-CM

## 2018-02-21 DIAGNOSIS — Z79.899 LONG-TERM USE OF HIGH-RISK MEDICATION: ICD-10-CM

## 2018-02-21 DIAGNOSIS — Z12.5 SCREENING FOR PROSTATE CANCER: ICD-10-CM

## 2018-02-21 RX ORDER — METFORMIN HYDROCHLORIDE 500 MG/1
TABLET, EXTENDED RELEASE ORAL
Qty: 180 TABLET | Refills: 3 | Status: SHIPPED | OUTPATIENT
Start: 2018-02-21 | End: 2018-02-27 | Stop reason: SDUPTHER

## 2018-02-21 NOTE — TELEPHONE ENCOUNTER
----- Message from Yuly Fabian sent at 2/21/2018  9:26 AM CST -----  Contact: SELF/ [phone]  Return call.

## 2018-02-27 ENCOUNTER — OFFICE VISIT (OUTPATIENT)
Dept: INTERNAL MEDICINE | Facility: CLINIC | Age: 58
End: 2018-02-27
Payer: COMMERCIAL

## 2018-02-27 ENCOUNTER — LAB VISIT (OUTPATIENT)
Dept: LAB | Facility: HOSPITAL | Age: 58
End: 2018-02-27
Attending: INTERNAL MEDICINE
Payer: COMMERCIAL

## 2018-02-27 ENCOUNTER — CLINICAL SUPPORT (OUTPATIENT)
Dept: OPTOMETRY | Facility: CLINIC | Age: 58
End: 2018-02-27
Attending: INTERNAL MEDICINE
Payer: COMMERCIAL

## 2018-02-27 VITALS
HEIGHT: 67 IN | DIASTOLIC BLOOD PRESSURE: 68 MMHG | HEART RATE: 90 BPM | WEIGHT: 231.69 LBS | OXYGEN SATURATION: 95 % | SYSTOLIC BLOOD PRESSURE: 126 MMHG | BODY MASS INDEX: 36.36 KG/M2

## 2018-02-27 DIAGNOSIS — Z12.5 SCREENING FOR PROSTATE CANCER: ICD-10-CM

## 2018-02-27 DIAGNOSIS — Z86.39 H/O TYPE 2 DIABETES MELLITUS: ICD-10-CM

## 2018-02-27 DIAGNOSIS — R73.02 GLUCOSE INTOLERANCE (IMPAIRED GLUCOSE TOLERANCE): ICD-10-CM

## 2018-02-27 DIAGNOSIS — E61.1 IRON DEFICIENCY: ICD-10-CM

## 2018-02-27 DIAGNOSIS — Z87.11 H/O PEPTIC ULCER: ICD-10-CM

## 2018-02-27 DIAGNOSIS — G43.511 INTRACTABLE PERSISTENT MIGRAINE AURA WITHOUT CEREBRAL INFARCTION AND WITH STATUS MIGRAINOSUS: ICD-10-CM

## 2018-02-27 DIAGNOSIS — E66.01 MORBID OBESITY: ICD-10-CM

## 2018-02-27 DIAGNOSIS — E78.2 HYPERLIPIDEMIA, MIXED: ICD-10-CM

## 2018-02-27 DIAGNOSIS — I10 ESSENTIAL HYPERTENSION: ICD-10-CM

## 2018-02-27 DIAGNOSIS — Z79.899 HIGH RISK MEDICATION USE: ICD-10-CM

## 2018-02-27 DIAGNOSIS — R73.02 GLUCOSE INTOLERANCE (IMPAIRED GLUCOSE TOLERANCE): Primary | ICD-10-CM

## 2018-02-27 LAB
ALBUMIN SERPL BCP-MCNC: 3.7 G/DL
ALP SERPL-CCNC: 73 U/L
ALT SERPL W/O P-5'-P-CCNC: 11 U/L
ANION GAP SERPL CALC-SCNC: 10 MMOL/L
AST SERPL-CCNC: 16 U/L
BASOPHILS # BLD AUTO: 0.04 K/UL
BASOPHILS NFR BLD: 0.5 %
BILIRUB SERPL-MCNC: 0.5 MG/DL
BUN SERPL-MCNC: 13 MG/DL
CALCIUM SERPL-MCNC: 9.2 MG/DL
CHLORIDE SERPL-SCNC: 106 MMOL/L
CHOLEST SERPL-MCNC: 120 MG/DL
CHOLEST/HDLC SERPL: 3.5 {RATIO}
CO2 SERPL-SCNC: 25 MMOL/L
COMPLEXED PSA SERPL-MCNC: 1.1 NG/ML
CREAT SERPL-MCNC: 0.7 MG/DL
DIFFERENTIAL METHOD: ABNORMAL
EOSINOPHIL # BLD AUTO: 0.1 K/UL
EOSINOPHIL NFR BLD: 0.7 %
ERYTHROCYTE [DISTWIDTH] IN BLOOD BY AUTOMATED COUNT: 18.8 %
EST. GFR  (AFRICAN AMERICAN): >60 ML/MIN/1.73 M^2
EST. GFR  (NON AFRICAN AMERICAN): >60 ML/MIN/1.73 M^2
ESTIMATED AVG GLUCOSE: 111 MG/DL
FERRITIN SERPL-MCNC: 3 NG/ML
GLUCOSE SERPL-MCNC: 93 MG/DL
HBA1C MFR BLD HPLC: 5.5 %
HCT VFR BLD AUTO: 39.4 %
HDLC SERPL-MCNC: 34 MG/DL
HDLC SERPL: 28.3 %
HGB BLD-MCNC: 12 G/DL
LDLC SERPL CALC-MCNC: 73 MG/DL
LYMPHOCYTES # BLD AUTO: 2.1 K/UL
LYMPHOCYTES NFR BLD: 24.1 %
MAGNESIUM SERPL-MCNC: 1.7 MG/DL
MCH RBC QN AUTO: 22.4 PG
MCHC RBC AUTO-ENTMCNC: 30.5 G/DL
MCV RBC AUTO: 74 FL
MONOCYTES # BLD AUTO: 1.2 K/UL
MONOCYTES NFR BLD: 13.6 %
NEUTROPHILS # BLD AUTO: 5.2 K/UL
NEUTROPHILS NFR BLD: 60.4 %
NONHDLC SERPL-MCNC: 86 MG/DL
PLATELET # BLD AUTO: 301 K/UL
PMV BLD AUTO: 10.1 FL
POTASSIUM SERPL-SCNC: 4.6 MMOL/L
PROT SERPL-MCNC: 6.9 G/DL
RBC # BLD AUTO: 5.36 M/UL
SODIUM SERPL-SCNC: 141 MMOL/L
TRIGL SERPL-MCNC: 65 MG/DL
TSH SERPL DL<=0.005 MIU/L-ACNC: 1.2 UIU/ML
VIT B12 SERPL-MCNC: 818 PG/ML
WBC # BLD AUTO: 8.56 K/UL

## 2018-02-27 PROCEDURE — 84153 ASSAY OF PSA TOTAL: CPT

## 2018-02-27 PROCEDURE — 99214 OFFICE O/P EST MOD 30 MIN: CPT | Mod: S$GLB,,, | Performed by: INTERNAL MEDICINE

## 2018-02-27 PROCEDURE — 83735 ASSAY OF MAGNESIUM: CPT

## 2018-02-27 PROCEDURE — 80061 LIPID PANEL: CPT

## 2018-02-27 PROCEDURE — 99999 PR PBB SHADOW E&M-EST. PATIENT-LVL III: CPT | Mod: PBBFAC,,, | Performed by: INTERNAL MEDICINE

## 2018-02-27 PROCEDURE — 83036 HEMOGLOBIN GLYCOSYLATED A1C: CPT

## 2018-02-27 PROCEDURE — 3074F SYST BP LT 130 MM HG: CPT | Mod: S$GLB,,, | Performed by: INTERNAL MEDICINE

## 2018-02-27 PROCEDURE — 92250 FUNDUS PHOTOGRAPHY W/I&R: CPT | Mod: S$GLB,,, | Performed by: OPHTHALMOLOGY

## 2018-02-27 PROCEDURE — 36415 COLL VENOUS BLD VENIPUNCTURE: CPT

## 2018-02-27 PROCEDURE — 3078F DIAST BP <80 MM HG: CPT | Mod: S$GLB,,, | Performed by: INTERNAL MEDICINE

## 2018-02-27 PROCEDURE — 99999 PR PBB SHADOW E&M-EST. PATIENT-LVL II: CPT | Mod: PBBFAC,,,

## 2018-02-27 PROCEDURE — 84443 ASSAY THYROID STIM HORMONE: CPT

## 2018-02-27 PROCEDURE — 85025 COMPLETE CBC W/AUTO DIFF WBC: CPT

## 2018-02-27 PROCEDURE — 82728 ASSAY OF FERRITIN: CPT

## 2018-02-27 PROCEDURE — 82607 VITAMIN B-12: CPT

## 2018-02-27 PROCEDURE — 80053 COMPREHEN METABOLIC PANEL: CPT

## 2018-02-27 RX ORDER — ATORVASTATIN CALCIUM 80 MG/1
80 TABLET, FILM COATED ORAL NIGHTLY
Qty: 90 TABLET | Refills: 3 | Status: SHIPPED | OUTPATIENT
Start: 2018-02-27 | End: 2019-05-02 | Stop reason: SDUPTHER

## 2018-02-27 RX ORDER — OMEPRAZOLE 20 MG/1
20 CAPSULE, DELAYED RELEASE ORAL EVERY MORNING
Qty: 90 CAPSULE | Refills: 3 | Status: SHIPPED | OUTPATIENT
Start: 2018-02-27 | End: 2019-02-25 | Stop reason: SDUPTHER

## 2018-02-27 RX ORDER — VERAPAMIL HYDROCHLORIDE 240 MG/1
CAPSULE, EXTENDED RELEASE ORAL
Qty: 90 CAPSULE | Refills: 3 | Status: SHIPPED | OUTPATIENT
Start: 2018-02-27 | End: 2019-05-02 | Stop reason: SDUPTHER

## 2018-02-27 RX ORDER — TRAZODONE HYDROCHLORIDE 50 MG/1
50 TABLET ORAL NIGHTLY PRN
Qty: 90 TABLET | Refills: 3 | Status: SHIPPED | OUTPATIENT
Start: 2018-02-27 | End: 2019-05-02

## 2018-02-27 RX ORDER — DIVALPROEX SODIUM 500 MG/1
TABLET, FILM COATED, EXTENDED RELEASE ORAL
Qty: 180 TABLET | Refills: 2 | Status: SHIPPED | OUTPATIENT
Start: 2018-02-27 | End: 2019-02-06 | Stop reason: SDUPTHER

## 2018-02-27 RX ORDER — METFORMIN HYDROCHLORIDE 500 MG/1
500 TABLET, EXTENDED RELEASE ORAL 2 TIMES DAILY WITH MEALS
Qty: 180 TABLET | Refills: 3 | Status: SHIPPED | OUTPATIENT
Start: 2018-02-27 | End: 2019-02-08 | Stop reason: SDUPTHER

## 2018-02-27 RX ORDER — FLUOXETINE HYDROCHLORIDE 40 MG/1
40 CAPSULE ORAL EVERY MORNING
Qty: 90 CAPSULE | Refills: 3 | Status: SHIPPED | OUTPATIENT
Start: 2018-02-27 | End: 2019-02-20 | Stop reason: SDUPTHER

## 2018-02-27 NOTE — PROGRESS NOTES
HPI     Diabetic Eye Exam    Additional comments: Photos           Comments   57 y.o. y/o here for screening for Diabetic Renopathy with non-dilated   fundus photos per Sejal Johnson MD         Last edited by Josefina Brown MA on 2/27/2018  1:26 PM. (History)            Assessment /Plan     For exam results, see Encounter Report.    Glucose intolerance (impaired glucose tolerance)  -     Diabetic Eye Screening Photo    High risk medication use  -     Diabetic Eye Screening Photo    H/O type 2 diabetes mellitus  -     Diabetic Eye Screening Photo

## 2018-03-01 NOTE — PROGRESS NOTES
Subjective:       Patient ID: David Nicole Jr. is a 57 y.o. male.    Chief Complaint: Follow-up   it has been one year since the patient was last seen  He thinks he is doing well  His wife is concerned that he may be depressed because he has not been as nterested in attending family events lately.  He thinks that he is disappointed with 2 of his children.  His wife, takes a different view, she is not disappointed and she thinks in the long run both children are going to do just fine.  He finds it difficult to not give advice and to not comment on what his children are doing.  He is compliant with his medication.  He has not been aware of any blood loss.  He has not been taking NSAIDs.  His migraines have been under good control.  His wife thinks he takes too much over-the-counter analgesics.  HPI  Review of Systems   Constitutional: Negative for activity change, chills and fever.   HENT: Negative for congestion, dental problem, hearing loss, tinnitus and trouble swallowing.    Eyes: Negative for visual disturbance.   Respiratory: Negative for cough, shortness of breath and wheezing.    Cardiovascular: Negative for chest pain, palpitations and leg swelling.   Genitourinary: Negative for dysuria, frequency and urgency.   Musculoskeletal: Negative for back pain and neck pain.   Neurological: Negative for dizziness, weakness and headaches.   Psychiatric/Behavioral: Negative for dysphoric mood and sleep disturbance. The patient is not nervous/anxious.        Objective:      Physical Exam   Constitutional: He is oriented to person, place, and time. He appears well-developed and well-nourished. No distress.   HENT:   Head: Atraumatic.   Mouth/Throat: No oropharyngeal exudate.   Eyes: Conjunctivae are normal. No scleral icterus.   Cardiovascular: Normal rate, regular rhythm and normal heart sounds.    Pulmonary/Chest: Effort normal and breath sounds normal.   Abdominal: Soft. There is no tenderness.   Musculoskeletal:  He exhibits no edema.   Lymphadenopathy:     He has no cervical adenopathy.   Neurological: He is alert and oriented to person, place, and time.   Skin: Skin is warm and dry.   Psychiatric: He has a normal mood and affect. His behavior is normal.       Assessment:       1. Glucose intolerance (impaired glucose tolerance)    2. High risk medication use    3. Hyperlipidemia, mixed    4. Essential hypertension    5. Iron deficiency    6. Intractable persistent migraine aura without cerebral infarction and with status migrainosus    7. Morbid obesity    8. H/O peptic ulcer    9. Screening for prostate cancer    10. H/O type 2 diabetes mellitus        Plan:   David was seen today for follow-up.    Diagnoses and all orders for this visit:    Glucose intolerance (impaired glucose tolerance)  -     Hemoglobin A1c; Future  -     Diabetic Eye Screening Photo; Future    High risk medication use  -     Vitamin B12; Future  -     Magnesium; Future  -     Diabetic Eye Screening Photo; Future    Hyperlipidemia, mixed  -     Lipid panel; Future  -     Comprehensive metabolic panel; Future    Essential hypertension  -     Comprehensive metabolic panel; Future    Iron deficiency  -     Ferritin; Future  -     CBC auto differential; Future    Intractable persistent migraine aura without cerebral infarction and with status migrainosus    Morbid obesity  -     TSH; Future  -     Hemoglobin A1c; Future    H/O peptic ulcer    Screening for prostate cancer  -     PSA, Screening; Future    H/O type 2 diabetes mellitus  -     Diabetic Eye Screening Photo; Future    Other orders  -     divalproex ER (DEPAKOTE) 500 MG Tb24; TAKE 2 TABLETS ONCE DAILY, to prevent migraine  -     FLUoxetine (PROZAC) 40 MG capsule; Take 1 capsule (40 mg total) by mouth every morning.  -     traZODone (DESYREL) 50 MG tablet; Take 1 tablet (50 mg total) by mouth nightly as needed for Insomnia.  -     atorvastatin (LIPITOR) 80 MG tablet; Take 1 tablet (80 mg total)  by mouth nightly.  -     verapamil (VERELAN) 240 MG C24P; TAKE 1 CAPSULE DAILY AS NEEDED (BLOOD PRESSURE CONTROL AND MIGRAINE PREVENTION)  -     metFORMIN (GLUCOPHAGE-XR) 500 MG 24 hr tablet; Take 1 tablet (500 mg total) by mouth 2 (two) times daily with meals.  -     omeprazole (PRILOSEC) 20 MG capsule; Take 1 capsule (20 mg total) by mouth every morning.      Current Outpatient Prescriptions   Medication Sig Dispense Refill Extra Info    ascorbic acid, vitamin C, (VITAMIN C) 100 MG tablet Take 100 mg by mouth once daily.   ***    atorvastatin (LIPITOR) 80 MG tablet Take 1 tablet (80 mg total) by mouth nightly. 90 tablet 3 ***    butalbital-acetaminophen-caffeine -40 mg (FIORICET, ESGIC) -40 mg per tablet Take 1 tablet by mouth every 4 (four) hours as needed for Pain. 120 tablet 2 ***    CYANOCOBALAMIN, VITAMIN B-12, (VITAMIN B-12 ORAL) Take by mouth.   ***    divalproex ER (DEPAKOTE) 500 MG Tb24 TAKE 2 TABLETS ONCE DAILY, to prevent migraine 180 tablet 2 ***    ferrous sulfate 325 mg (65 mg iron) Tab tablet Take 1 tablet (325 mg total) by mouth daily with breakfast.  0 ***    FLUoxetine (PROZAC) 40 MG capsule Take 1 capsule (40 mg total) by mouth every morning. 90 capsule 3 ***    fluticasone (FLONASE) 50 mcg/actuation nasal spray USE 2 SPRAYS IN EACH NOSTRIL ONCE DAILY 48 g 3 ***    metFORMIN (GLUCOPHAGE-XR) 500 MG 24 hr tablet Take 1 tablet (500 mg total) by mouth 2 (two) times daily with meals. 180 tablet 3 ***    multivitamin (ONE DAILY MULTIVITAMIN) per tablet Take 1 tablet by mouth once daily.   ***    omeprazole (PRILOSEC) 20 MG capsule Take 1 capsule (20 mg total) by mouth every morning. 90 capsule 3 ***    ondansetron (ZOFRAN-ODT) 8 MG TbDL Take 1 tablet (8 mg total) by mouth every 8 (eight) hours as needed. 30 tablet 2 ***    SAW PALMETTO ORAL Take by mouth.   ***    tizanidine (ZANAFLEX) 2 MG tablet 1-2 pills around bedtime as needed for headache or neck pain 60 tablet 5 ***     traZODone (DESYREL) 50 MG tablet Take 1 tablet (50 mg total) by mouth nightly as needed for Insomnia. 90 tablet 3 ***    verapamil (VERELAN) 240 MG C24P TAKE 1 CAPSULE DAILY AS NEEDED (BLOOD PRESSURE CONTROL AND MIGRAINE PREVENTION) 90 capsule 3 ***    rizatriptan (MAXALT) 10 MG tablet Take 1 tablet (10 mg total) by mouth once as needed for Migraine. Only one tablet per 24 hours 18 tablet 6 ***    sildenafil (VIAGRA) 100 MG tablet Take 1 tablet (100 mg total) by mouth daily as needed for Erectile Dysfunction. 9 tablet 5 ***     No current facility-administered medications for this visit.      Current Outpatient Prescriptions   Medication Sig Dispense Refill Extra Info    ascorbic acid, vitamin C, (VITAMIN C) 100 MG tablet Take 100 mg by mouth once daily.   ***    atorvastatin (LIPITOR) 80 MG tablet Take 1 tablet (80 mg total) by mouth nightly. 90 tablet 3 ***    butalbital-acetaminophen-caffeine -40 mg (FIORICET, ESGIC) -40 mg per tablet Take 1 tablet by mouth every 4 (four) hours as needed for Pain. 120 tablet 2 ***    CYANOCOBALAMIN, VITAMIN B-12, (VITAMIN B-12 ORAL) Take by mouth.   ***    divalproex ER (DEPAKOTE) 500 MG Tb24 TAKE 2 TABLETS ONCE DAILY, to prevent migraine 180 tablet 2 ***    ferrous sulfate 325 mg (65 mg iron) Tab tablet Take 1 tablet (325 mg total) by mouth daily with breakfast.  0 ***    FLUoxetine (PROZAC) 40 MG capsule Take 1 capsule (40 mg total) by mouth every morning. 90 capsule 3 ***    fluticasone (FLONASE) 50 mcg/actuation nasal spray USE 2 SPRAYS IN EACH NOSTRIL ONCE DAILY 48 g 3 ***    metFORMIN (GLUCOPHAGE-XR) 500 MG 24 hr tablet Take 1 tablet (500 mg total) by mouth 2 (two) times daily with meals. 180 tablet 3 ***    multivitamin (ONE DAILY MULTIVITAMIN) per tablet Take 1 tablet by mouth once daily.   ***    omeprazole (PRILOSEC) 20 MG capsule Take 1 capsule (20 mg total) by mouth every morning. 90 capsule 3 ***    ondansetron (ZOFRAN-ODT) 8 MG TbDL  Take 1 tablet (8 mg total) by mouth every 8 (eight) hours as needed. 30 tablet 2 ***    SAW PALMETTO ORAL Take by mouth.   ***    tizanidine (ZANAFLEX) 2 MG tablet 1-2 pills around bedtime as needed for headache or neck pain 60 tablet 5 ***    traZODone (DESYREL) 50 MG tablet Take 1 tablet (50 mg total) by mouth nightly as needed for Insomnia. 90 tablet 3 ***    verapamil (VERELAN) 240 MG C24P TAKE 1 CAPSULE DAILY AS NEEDED (BLOOD PRESSURE CONTROL AND MIGRAINE PREVENTION) 90 capsule 3 ***    rizatriptan (MAXALT) 10 MG tablet Take 1 tablet (10 mg total) by mouth once as needed for Migraine. Only one tablet per 24 hours 18 tablet 6 ***    sildenafil (VIAGRA) 100 MG tablet Take 1 tablet (100 mg total) by mouth daily as needed for Erectile Dysfunction. 9 tablet 5 ***     No current facility-administered medications for this visit.      Medication List with Changes/Refills   Current Medications    ASCORBIC ACID, VITAMIN C, (VITAMIN C) 100 MG TABLET    Take 100 mg by mouth once daily.    BUTALBITAL-ACETAMINOPHEN-CAFFEINE -40 MG (FIORICET, ESGIC) -40 MG PER TABLET    Take 1 tablet by mouth every 4 (four) hours as needed for Pain.    CYANOCOBALAMIN, VITAMIN B-12, (VITAMIN B-12 ORAL)    Take by mouth.    FERROUS SULFATE 325 MG (65 MG IRON) TAB TABLET    Take 1 tablet (325 mg total) by mouth daily with breakfast.    FLUTICASONE (FLONASE) 50 MCG/ACTUATION NASAL SPRAY    USE 2 SPRAYS IN EACH NOSTRIL ONCE DAILY    MULTIVITAMIN (ONE DAILY MULTIVITAMIN) PER TABLET    Take 1 tablet by mouth once daily.    ONDANSETRON (ZOFRAN-ODT) 8 MG TBDL    Take 1 tablet (8 mg total) by mouth every 8 (eight) hours as needed.    RIZATRIPTAN (MAXALT) 10 MG TABLET    Take 1 tablet (10 mg total) by mouth once as needed for Migraine. Only one tablet per 24 hours    SAW PALMETTO ORAL    Take by mouth.    SILDENAFIL (VIAGRA) 100 MG TABLET    Take 1 tablet (100 mg total) by mouth daily as needed for Erectile Dysfunction.    TIZANIDINE  (ZANAFLEX) 2 MG TABLET    1-2 pills around bedtime as needed for headache or neck pain   Changed and/or Refilled Medications    Modified Medication Previous Medication    ATORVASTATIN (LIPITOR) 80 MG TABLET atorvastatin (LIPITOR) 80 MG tablet       Take 1 tablet (80 mg total) by mouth nightly.    TAKE 1 TABLET NIGHTLY    DIVALPROEX ER (DEPAKOTE) 500 MG TB24 divalproex ER (DEPAKOTE) 500 MG Tb24       TAKE 2 TABLETS ONCE DAILY, to prevent migraine    TAKE 2 TABLETS ONCE DAILY    FLUOXETINE (PROZAC) 40 MG CAPSULE FLUoxetine (PROZAC) 40 MG capsule       Take 1 capsule (40 mg total) by mouth every morning.    TAKE 1 CAPSULE EVERY MORNING    METFORMIN (GLUCOPHAGE-XR) 500 MG 24 HR TABLET metFORMIN (GLUCOPHAGE-XR) 500 MG 24 hr tablet       Take 1 tablet (500 mg total) by mouth 2 (two) times daily with meals.    TAKE 1 TABLET TWICE A DAY WITH MEALS    OMEPRAZOLE (PRILOSEC) 20 MG CAPSULE omeprazole (PRILOSEC) 20 MG capsule       Take 1 capsule (20 mg total) by mouth every morning.    TAKE 1 CAPSULE EVERY MORNING    TRAZODONE (DESYREL) 50 MG TABLET trazodone (DESYREL) 50 MG tablet       Take 1 tablet (50 mg total) by mouth nightly as needed for Insomnia.    Take 1 tablet (50 mg total) by mouth nightly as needed for Insomnia.    VERAPAMIL (VERELAN) 240 MG C24P verapamil (VERELAN) 240 MG C24P       TAKE 1 CAPSULE DAILY AS NEEDED (BLOOD PRESSURE CONTROL AND MIGRAINE PREVENTION)    TAKE 1 CAPSULE DAILY AS NEEDED (BLOOD PRESSURE CONTROL AND MIGRAINE PREVENTION)     Follow-up in about 6 months (around 8/27/2018) for for PE.

## 2018-03-01 NOTE — PROGRESS NOTES
Subjective:       Patient ID: David Nicole Jr. is a 57 y.o. male.    Chief Complaint: Follow-up  it has been one year since the patient was last seen  He thinks he is doing well  His wife is concerned that he may be depressed because he has not been as nterested in attending family events lately.  He thinks that he is disappointed with 2 of his children.  His wife, takes a different view, she is not disappointed and she thinks in the long run both children are going to do just fine.  He finds it difficult to not give advice and to not comment on what his children are doing.  He is compliant with his medication.  He has not been aware of any blood loss.  He has not been taking NSAIDs.  His migraines have been under good control.  His wife thinks he takes too much over-the-counter analgesics.    HPI  Review of Systems   Constitutional: Negative for activity change, chills, fever and unexpected weight change.   HENT: Negative for congestion, dental problem, hearing loss, tinnitus and trouble swallowing.    Eyes: Negative for visual disturbance.   Respiratory: Negative for cough, chest tightness, shortness of breath and wheezing.    Cardiovascular: Negative for chest pain, palpitations and leg swelling.   Gastrointestinal: Negative for abdominal pain.   Genitourinary: Negative for dysuria, frequency and urgency.   Musculoskeletal: Negative for back pain and neck pain.   Neurological: Negative for dizziness, weakness and headaches.   Psychiatric/Behavioral: Negative for dysphoric mood and sleep disturbance. The patient is not nervous/anxious.        Objective:      Physical Exam   Constitutional: He is oriented to person, place, and time. No distress.   HENT:   Head: Atraumatic.   Eyes: Conjunctivae are normal. No scleral icterus.   Neck: Neck supple.   Cardiovascular: Normal rate and regular rhythm.    Pulmonary/Chest: Effort normal and breath sounds normal.   Abdominal: Soft. There is no tenderness.    Musculoskeletal: He exhibits no edema.   Lymphadenopathy:     He has no cervical adenopathy.   Neurological: He is alert and oriented to person, place, and time.   Skin: Skin is warm and dry.   Psychiatric: He has a normal mood and affect. His behavior is normal.   Nursing note and vitals reviewed.      Assessment:       1. Glucose intolerance (impaired glucose tolerance)    2. High risk medication use    3. Hyperlipidemia, mixed    4. Essential hypertension    5. Iron deficiency    6. Intractable persistent migraine aura without cerebral infarction and with status migrainosus    7. Morbid obesity    8. H/O peptic ulcer    9. Screening for prostate cancer    10. H/O type 2 diabetes mellitus        Plan:   David was seen today for follow-up.    Diagnoses and all orders for this visit:    Glucose intolerance (impaired glucose tolerance)  -     Hemoglobin A1c; Future  -     Diabetic Eye Screening Photo; Future    High risk medication use  -     Vitamin B12; Future  -     Magnesium; Future  -     Diabetic Eye Screening Photo; Future    Hyperlipidemia, mixed  -     Lipid panel; Future  -     Comprehensive metabolic panel; Future    Essential hypertension  -     Comprehensive metabolic panel; Future    Iron deficiency  -     Ferritin; Future  -     CBC auto differential; Future    Intractable persistent migraine aura without cerebral infarction and with status migrainosus    Morbid obesity  -     TSH; Future  -     Hemoglobin A1c; Future    H/O peptic ulcer    Screening for prostate cancer  -     PSA, Screening; Future    H/O type 2 diabetes mellitus  -     Diabetic Eye Screening Photo; Future    Other orders  -     divalproex ER (DEPAKOTE) 500 MG Tb24; TAKE 2 TABLETS ONCE DAILY, to prevent migraine  -     FLUoxetine (PROZAC) 40 MG capsule; Take 1 capsule (40 mg total) by mouth every morning.  -     traZODone (DESYREL) 50 MG tablet; Take 1 tablet (50 mg total) by mouth nightly as needed for Insomnia.  -      atorvastatin (LIPITOR) 80 MG tablet; Take 1 tablet (80 mg total) by mouth nightly.  -     verapamil (VERELAN) 240 MG C24P; TAKE 1 CAPSULE DAILY AS NEEDED (BLOOD PRESSURE CONTROL AND MIGRAINE PREVENTION)  -     metFORMIN (GLUCOPHAGE-XR) 500 MG 24 hr tablet; Take 1 tablet (500 mg total) by mouth 2 (two) times daily with meals.  -     omeprazole (PRILOSEC) 20 MG capsule; Take 1 capsule (20 mg total) by mouth every morning.      Medication List with Changes/Refills   Current Medications    ASCORBIC ACID, VITAMIN C, (VITAMIN C) 100 MG TABLET    Take 100 mg by mouth once daily.    BUTALBITAL-ACETAMINOPHEN-CAFFEINE -40 MG (FIORICET, ESGIC) -40 MG PER TABLET    Take 1 tablet by mouth every 4 (four) hours as needed for Pain.    CYANOCOBALAMIN, VITAMIN B-12, (VITAMIN B-12 ORAL)    Take by mouth.    FERROUS SULFATE 325 MG (65 MG IRON) TAB TABLET    Take 1 tablet (325 mg total) by mouth daily with breakfast.    FLUTICASONE (FLONASE) 50 MCG/ACTUATION NASAL SPRAY    USE 2 SPRAYS IN EACH NOSTRIL ONCE DAILY    MULTIVITAMIN (ONE DAILY MULTIVITAMIN) PER TABLET    Take 1 tablet by mouth once daily.    ONDANSETRON (ZOFRAN-ODT) 8 MG TBDL    Take 1 tablet (8 mg total) by mouth every 8 (eight) hours as needed.    RIZATRIPTAN (MAXALT) 10 MG TABLET    Take 1 tablet (10 mg total) by mouth once as needed for Migraine. Only one tablet per 24 hours    SAW PALMETTO ORAL    Take by mouth.    SILDENAFIL (VIAGRA) 100 MG TABLET    Take 1 tablet (100 mg total) by mouth daily as needed for Erectile Dysfunction.    TIZANIDINE (ZANAFLEX) 2 MG TABLET    1-2 pills around bedtime as needed for headache or neck pain   Changed and/or Refilled Medications    Modified Medication Previous Medication    ATORVASTATIN (LIPITOR) 80 MG TABLET atorvastatin (LIPITOR) 80 MG tablet       Take 1 tablet (80 mg total) by mouth nightly.    TAKE 1 TABLET NIGHTLY    DIVALPROEX ER (DEPAKOTE) 500 MG TB24 divalproex ER (DEPAKOTE) 500 MG Tb24       TAKE 2 TABLETS ONCE  DAILY, to prevent migraine    TAKE 2 TABLETS ONCE DAILY    FLUOXETINE (PROZAC) 40 MG CAPSULE FLUoxetine (PROZAC) 40 MG capsule       Take 1 capsule (40 mg total) by mouth every morning.    TAKE 1 CAPSULE EVERY MORNING    METFORMIN (GLUCOPHAGE-XR) 500 MG 24 HR TABLET metFORMIN (GLUCOPHAGE-XR) 500 MG 24 hr tablet       Take 1 tablet (500 mg total) by mouth 2 (two) times daily with meals.    TAKE 1 TABLET TWICE A DAY WITH MEALS    OMEPRAZOLE (PRILOSEC) 20 MG CAPSULE omeprazole (PRILOSEC) 20 MG capsule       Take 1 capsule (20 mg total) by mouth every morning.    TAKE 1 CAPSULE EVERY MORNING    TRAZODONE (DESYREL) 50 MG TABLET trazodone (DESYREL) 50 MG tablet       Take 1 tablet (50 mg total) by mouth nightly as needed for Insomnia.    Take 1 tablet (50 mg total) by mouth nightly as needed for Insomnia.    VERAPAMIL (VERELAN) 240 MG C24P verapamil (VERELAN) 240 MG C24P       TAKE 1 CAPSULE DAILY AS NEEDED (BLOOD PRESSURE CONTROL AND MIGRAINE PREVENTION)    TAKE 1 CAPSULE DAILY AS NEEDED (BLOOD PRESSURE CONTROL AND MIGRAINE PREVENTION)     Follow-up in about 6 months (around 8/27/2018) for for PE.

## 2018-03-06 ENCOUNTER — TELEPHONE (OUTPATIENT)
Dept: INTERNAL MEDICINE | Facility: CLINIC | Age: 58
End: 2018-03-06

## 2018-03-06 NOTE — TELEPHONE ENCOUNTER
----- Message from Kareen Guerrero sent at 3/6/2018  3:40 PM CST -----  Contact: Ash/ spouse   Patient spouse called state doctor call to speak to her regarding pt.     Please advise

## 2018-07-21 ENCOUNTER — OFFICE VISIT (OUTPATIENT)
Dept: URGENT CARE | Facility: CLINIC | Age: 58
End: 2018-07-21
Payer: COMMERCIAL

## 2018-07-21 VITALS
HEART RATE: 96 BPM | HEIGHT: 67 IN | TEMPERATURE: 100 F | DIASTOLIC BLOOD PRESSURE: 88 MMHG | WEIGHT: 231 LBS | OXYGEN SATURATION: 96 % | SYSTOLIC BLOOD PRESSURE: 132 MMHG | BODY MASS INDEX: 36.26 KG/M2

## 2018-07-21 DIAGNOSIS — L03.114 CELLULITIS OF LEFT UPPER EXTREMITY: ICD-10-CM

## 2018-07-21 DIAGNOSIS — T63.461A WASP STING, ACCIDENTAL OR UNINTENTIONAL, INITIAL ENCOUNTER: Primary | ICD-10-CM

## 2018-07-21 PROCEDURE — 3079F DIAST BP 80-89 MM HG: CPT | Mod: CPTII,S$GLB,, | Performed by: INTERNAL MEDICINE

## 2018-07-21 PROCEDURE — 3008F BODY MASS INDEX DOCD: CPT | Mod: CPTII,S$GLB,, | Performed by: INTERNAL MEDICINE

## 2018-07-21 PROCEDURE — 99213 OFFICE O/P EST LOW 20 MIN: CPT | Mod: 25,S$GLB,, | Performed by: INTERNAL MEDICINE

## 2018-07-21 PROCEDURE — 3075F SYST BP GE 130 - 139MM HG: CPT | Mod: CPTII,S$GLB,, | Performed by: INTERNAL MEDICINE

## 2018-07-21 PROCEDURE — 96372 THER/PROPH/DIAG INJ SC/IM: CPT | Mod: S$GLB,,, | Performed by: INTERNAL MEDICINE

## 2018-07-21 RX ORDER — BETAMETHASONE SODIUM PHOSPHATE AND BETAMETHASONE ACETATE 3; 3 MG/ML; MG/ML
9 INJECTION, SUSPENSION INTRA-ARTICULAR; INTRALESIONAL; INTRAMUSCULAR; SOFT TISSUE ONCE
Status: COMPLETED | OUTPATIENT
Start: 2018-07-21 | End: 2018-07-21

## 2018-07-21 RX ORDER — SULFAMETHOXAZOLE AND TRIMETHOPRIM 800; 160 MG/1; MG/1
1 TABLET ORAL 2 TIMES DAILY
Qty: 14 TABLET | Refills: 0 | Status: SHIPPED | OUTPATIENT
Start: 2018-07-21 | End: 2019-04-03

## 2018-07-21 RX ORDER — SULFAMETHOXAZOLE AND TRIMETHOPRIM 800; 160 MG/1; MG/1
1 TABLET ORAL 2 TIMES DAILY
Qty: 14 TABLET | Refills: 0 | Status: SHIPPED | OUTPATIENT
Start: 2018-07-21 | End: 2018-07-21 | Stop reason: CLARIF

## 2018-07-21 RX ADMIN — BETAMETHASONE SODIUM PHOSPHATE AND BETAMETHASONE ACETATE 9 MG: 3; 3 INJECTION, SUSPENSION INTRA-ARTICULAR; INTRALESIONAL; INTRAMUSCULAR; SOFT TISSUE at 06:07

## 2018-07-21 NOTE — PROGRESS NOTES
"Subjective:       Patient ID: David Nicole Jr. is a 58 y.o. male.    Vitals:  height is 5' 7" (1.702 m) and weight is 104.8 kg (231 lb). His temperature is 99.7 °F (37.6 °C). His blood pressure is 132/88 and his pulse is 96. His oxygen saturation is 96%.     Chief Complaint: Insect Bite (wasp sting lt. elbow/ rt. side of face.)    Insect Bite   This is a new problem. The current episode started yesterday (wasp bite lt. elbow/ rt side of face.). The problem has been gradually worsening. Nothing aggravates the symptoms. Treatments tried: benedryl. The treatment provided no relief.     Review of Systems   Skin:        Lt. Elbow /rt. Side of face wasp sting. Cellulitis , warm to touch , swelling lt. Elbow.       Objective:      Physical Exam   Constitutional: He appears well-developed and well-nourished.   HENT:   Head: Normocephalic and atraumatic.   Eyes: Conjunctivae and EOM are normal. Pupils are equal, round, and reactive to light.   Cardiovascular: Normal rate and regular rhythm.    Pulmonary/Chest: Effort normal and breath sounds normal.   Skin:   12-13 cm area erythema,swelling,warmth and tenderness L arm near elbow around insect sting; 2-3 cm area sl erythema and swelling R cheek around insect sting   Nursing note and vitals reviewed.      Assessment:       1. Wasp sting, accidental or unintentional, initial encounter    2. Cellulitis of left upper extremity        Plan:         Wasp sting, accidental or unintentional, initial encounter  -     betamethasone acetate-betamethasone sodium phosphate injection 9 mg; Inject 1.5 mLs (9 mg total) into the muscle once.    Cellulitis of left upper extremity  -     Discontinue: sulfamethoxazole-trimethoprim 800-160mg (BACTRIM DS) 800-160 mg Tab; Take 1 tablet by mouth 2 (two) times daily.  Dispense: 14 tablet; Refill: 0  -     sulfamethoxazole-trimethoprim 800-160mg (BACTRIM DS) 800-160 mg Tab; Take 1 tablet by mouth 2 (two) times daily.  Dispense: 14 tablet; Refill: " 0

## 2019-02-06 DIAGNOSIS — E78.2 HYPERLIPIDEMIA, MIXED: ICD-10-CM

## 2019-02-06 DIAGNOSIS — Z79.899 HIGH RISK MEDICATION USE: ICD-10-CM

## 2019-02-06 DIAGNOSIS — G43.111 INTRACTABLE MIGRAINE WITH AURA WITH STATUS MIGRAINOSUS: ICD-10-CM

## 2019-02-06 DIAGNOSIS — E61.1 IRON DEFICIENCY: ICD-10-CM

## 2019-02-06 DIAGNOSIS — Z87.11 H/O PEPTIC ULCER: ICD-10-CM

## 2019-02-06 DIAGNOSIS — Z86.39 H/O TYPE 2 DIABETES MELLITUS: ICD-10-CM

## 2019-02-06 DIAGNOSIS — I10 ESSENTIAL HYPERTENSION: ICD-10-CM

## 2019-02-06 DIAGNOSIS — D64.9 ANEMIA, UNSPECIFIED TYPE: ICD-10-CM

## 2019-02-06 DIAGNOSIS — R73.02 GLUCOSE INTOLERANCE (IMPAIRED GLUCOSE TOLERANCE): Primary | ICD-10-CM

## 2019-02-06 DIAGNOSIS — Z12.5 SCREENING FOR PROSTATE CANCER: ICD-10-CM

## 2019-02-06 RX ORDER — DIVALPROEX SODIUM 500 MG/1
TABLET, FILM COATED, EXTENDED RELEASE ORAL
Qty: 180 TABLET | Refills: 0 | Status: SHIPPED | OUTPATIENT
Start: 2019-02-06 | End: 2019-05-02 | Stop reason: SDUPTHER

## 2019-02-10 RX ORDER — METFORMIN HYDROCHLORIDE 500 MG/1
TABLET, EXTENDED RELEASE ORAL
Qty: 180 TABLET | Refills: 1 | Status: SHIPPED | OUTPATIENT
Start: 2019-02-10 | End: 2019-05-02 | Stop reason: SDUPTHER

## 2019-02-19 ENCOUNTER — PATIENT MESSAGE (OUTPATIENT)
Dept: INTERNAL MEDICINE | Facility: CLINIC | Age: 59
End: 2019-02-19

## 2019-02-20 RX ORDER — FLUOXETINE HYDROCHLORIDE 40 MG/1
40 CAPSULE ORAL EVERY MORNING
Qty: 90 CAPSULE | Refills: 3 | Status: SHIPPED | OUTPATIENT
Start: 2019-02-20 | End: 2019-05-02 | Stop reason: SDUPTHER

## 2019-02-25 RX ORDER — OMEPRAZOLE 20 MG/1
CAPSULE, DELAYED RELEASE ORAL
Qty: 90 CAPSULE | Refills: 3 | Status: SHIPPED | OUTPATIENT
Start: 2019-02-25 | End: 2019-05-02 | Stop reason: SDUPTHER

## 2019-04-01 ENCOUNTER — OFFICE VISIT (OUTPATIENT)
Dept: INTERNAL MEDICINE | Facility: CLINIC | Age: 59
End: 2019-04-01
Payer: COMMERCIAL

## 2019-04-01 VITALS
BODY MASS INDEX: 36.74 KG/M2 | WEIGHT: 234.56 LBS | OXYGEN SATURATION: 98 % | DIASTOLIC BLOOD PRESSURE: 82 MMHG | SYSTOLIC BLOOD PRESSURE: 124 MMHG | HEART RATE: 92 BPM

## 2019-04-01 DIAGNOSIS — N50.819 TESTICLE PAIN: ICD-10-CM

## 2019-04-01 DIAGNOSIS — N50.89 SCROTAL SWELLING: Primary | ICD-10-CM

## 2019-04-01 LAB
BILIRUB UR QL STRIP: NEGATIVE
CLARITY UR REFRACT.AUTO: CLEAR
COLOR UR AUTO: YELLOW
GLUCOSE UR QL STRIP: NEGATIVE
HGB UR QL STRIP: NEGATIVE
KETONES UR QL STRIP: NEGATIVE
LEUKOCYTE ESTERASE UR QL STRIP: NEGATIVE
NITRITE UR QL STRIP: NEGATIVE
PH UR STRIP: 6 [PH] (ref 5–8)
PROT UR QL STRIP: NEGATIVE
SP GR UR STRIP: 1.02 (ref 1–1.03)
URN SPEC COLLECT METH UR: NORMAL

## 2019-04-01 PROCEDURE — 99999 PR PBB SHADOW E&M-EST. PATIENT-LVL III: CPT | Mod: PBBFAC,,, | Performed by: PHYSICIAN ASSISTANT

## 2019-04-01 PROCEDURE — 81003 URINALYSIS AUTO W/O SCOPE: CPT

## 2019-04-01 PROCEDURE — 99214 OFFICE O/P EST MOD 30 MIN: CPT | Mod: S$GLB,,, | Performed by: PHYSICIAN ASSISTANT

## 2019-04-01 PROCEDURE — 99999 PR PBB SHADOW E&M-EST. PATIENT-LVL III: ICD-10-PCS | Mod: PBBFAC,,, | Performed by: PHYSICIAN ASSISTANT

## 2019-04-01 PROCEDURE — 99214 PR OFFICE/OUTPT VISIT, EST, LEVL IV, 30-39 MIN: ICD-10-PCS | Mod: S$GLB,,, | Performed by: PHYSICIAN ASSISTANT

## 2019-04-01 NOTE — PROGRESS NOTES
Subjective:       Patient ID: David Nicole Jr. is a 58 y.o. male.    Chief Complaint: Testicle Pain and Groin Swelling    HPI     Established pt of Sejal Johnson MD (new to me)    Here for urgent care/same day visit.     C/o scrotal swelling, onset about one week ago. No recent strenuous activity or straining. He is urinating without difficulty. Denies any dysuria, hematuria, penile discharge, or problems with ejaculation. He states this has happened before after having a vasectomy many years ago.     Review of patient's allergies indicates:  No Known Allergies    Past Medical History:   Diagnosis Date    Anxiety     Hyperlipidemia     Hypertension     Insomnia     Joint pain     Knee pain     Prediabetes      Social History     Tobacco Use    Smoking status: Never Smoker    Smokeless tobacco: Never Used   Substance Use Topics    Alcohol use: Yes     Comment: occasional     Drug use: No     Patient Active Problem List   Diagnosis    HTN (hypertension)    Hyperlipidemia, mixed    Glucose intolerance (impaired glucose tolerance)    Migraine headache    Intractable migraine with aura with status migrainosus    High risk medication use    Morbid obesity    Screening    Screen for colon cancer    Screening for prostate cancer    Need for Tdap vaccination    Anemia    Long-term use of high-risk medication, PPI    Iron deficiency    H/O peptic ulcer    H/O type 2 diabetes mellitus             Review of Systems   Constitutional: Negative for chills, fever and unexpected weight change.   Eyes: Negative for visual disturbance.   Respiratory: Negative for cough, shortness of breath and wheezing.    Cardiovascular: Negative for chest pain and leg swelling.   Gastrointestinal: Negative for abdominal pain, nausea and vomiting.   Genitourinary: Positive for scrotal swelling. Negative for difficulty urinating, discharge, dysuria, genital sores, hematuria, penile pain and urgency.   Skin: Negative  for rash.   Neurological: Negative for weakness and headaches.       Objective: /82   Pulse 92   Wt 106.4 kg (234 lb 9.1 oz)   SpO2 98%   BMI 36.74 kg/m²         Physical Exam   Constitutional: He appears well-developed and well-nourished. No distress.   HENT:   Head: Normocephalic and atraumatic.   Mouth/Throat: Oropharynx is clear and moist.   Cardiovascular: Normal rate and regular rhythm. Exam reveals no friction rub.   No murmur heard.  Pulmonary/Chest: Effort normal and breath sounds normal. He has no wheezes. He has no rales.   Abdominal: Soft. Bowel sounds are normal. There is no tenderness. Hernia confirmed negative in the right inguinal area and confirmed negative in the left inguinal area.   Genitourinary: Penis normal. Right testis shows swelling. Right testis shows no mass and no tenderness. Right testis is descended. Left testis shows no mass. Left testis is descended.   Lymphadenopathy:     He has no cervical adenopathy. No inguinal adenopathy noted on the right or left side.   Neurological: He is alert.   Skin: Skin is warm and dry. Capillary refill takes less than 2 seconds. No rash noted.   Psychiatric: He has a normal mood and affect.   Vitals reviewed.      Assessment:       1. Scrotal swelling    2. Testicle pain        Plan:         David was seen today for testicle pain and groin swelling.    Diagnoses and all orders for this visit:    Scrotal swelling  -     Ambulatory referral to Urology  -     US Scrotum And Testicles; Future  -     Urinalysis; Future  -     Urinalysis    Testicle pain  -     Ambulatory referral to Urology  -     US Scrotum And Testicles; Future  -     Urinalysis; Future  -     Urinalysis      Scrotal support wear advised  Tylenol/ibuprofen prn  Will obtain US of Scrotum/Testes and place durology referral   RTC/ED precautions discussed    Ary Chapin PA-C

## 2019-04-02 ENCOUNTER — HOSPITAL ENCOUNTER (OUTPATIENT)
Dept: RADIOLOGY | Facility: HOSPITAL | Age: 59
Discharge: HOME | End: 2019-04-02
Attending: PHYSICIAN ASSISTANT
Payer: COMMERCIAL

## 2019-04-02 DIAGNOSIS — N50.819 TESTICLE PAIN: ICD-10-CM

## 2019-04-02 DIAGNOSIS — N50.89 SCROTAL SWELLING: ICD-10-CM

## 2019-04-02 PROCEDURE — 76870 US EXAM SCROTUM: CPT | Mod: TC

## 2019-04-02 PROCEDURE — 76870 US SCROTUM AND TESTICLES: ICD-10-PCS | Mod: 26,,, | Performed by: RADIOLOGY

## 2019-04-02 PROCEDURE — 76870 US EXAM SCROTUM: CPT | Mod: 26,,, | Performed by: RADIOLOGY

## 2019-04-03 ENCOUNTER — OFFICE VISIT (OUTPATIENT)
Dept: UROLOGY | Facility: CLINIC | Age: 59
End: 2019-04-03
Payer: COMMERCIAL

## 2019-04-03 VITALS
WEIGHT: 234 LBS | SYSTOLIC BLOOD PRESSURE: 131 MMHG | HEART RATE: 94 BPM | DIASTOLIC BLOOD PRESSURE: 84 MMHG | HEIGHT: 67 IN | BODY MASS INDEX: 36.73 KG/M2

## 2019-04-03 DIAGNOSIS — N43.3 LEFT HYDROCELE: ICD-10-CM

## 2019-04-03 DIAGNOSIS — N50.89 SCROTAL SWELLING: ICD-10-CM

## 2019-04-03 DIAGNOSIS — N50.811 PAIN IN RIGHT TESTICLE: Primary | ICD-10-CM

## 2019-04-03 DIAGNOSIS — N43.3 RIGHT HYDROCELE: ICD-10-CM

## 2019-04-03 PROCEDURE — 87086 URINE CULTURE/COLONY COUNT: CPT

## 2019-04-03 PROCEDURE — 99203 OFFICE O/P NEW LOW 30 MIN: CPT | Mod: S$GLB,,, | Performed by: NURSE PRACTITIONER

## 2019-04-03 PROCEDURE — 99203 PR OFFICE/OUTPT VISIT, NEW, LEVL III, 30-44 MIN: ICD-10-PCS | Mod: S$GLB,,, | Performed by: NURSE PRACTITIONER

## 2019-04-03 PROCEDURE — 99999 PR PBB SHADOW E&M-EST. PATIENT-LVL III: ICD-10-PCS | Mod: PBBFAC,,, | Performed by: NURSE PRACTITIONER

## 2019-04-03 PROCEDURE — 99999 PR PBB SHADOW E&M-EST. PATIENT-LVL III: CPT | Mod: PBBFAC,,, | Performed by: NURSE PRACTITIONER

## 2019-04-03 RX ORDER — OXAPROZIN 600 MG/1
600 TABLET, FILM COATED ORAL 2 TIMES DAILY WITH MEALS
Qty: 28 TABLET | Refills: 0 | Status: SHIPPED | OUTPATIENT
Start: 2019-04-03 | End: 2019-04-17

## 2019-04-03 RX ORDER — SULFAMETHOXAZOLE AND TRIMETHOPRIM 800; 160 MG/1; MG/1
1 TABLET ORAL 2 TIMES DAILY
Qty: 20 TABLET | Refills: 0 | Status: SHIPPED | OUTPATIENT
Start: 2019-04-03 | End: 2019-04-13

## 2019-04-03 NOTE — PROGRESS NOTES
CHIEF COMPLAINT:    Mr. Nicole is a 58 y.o. male presenting for right testicle pain and scrotal swelling.  PRESENTING ILLNESS:    David Nicole Jr. is a 58 y.o. male who presents for right testicle pain and scrotal swelling. This is his initial clinic visit.     Pt was seen by internal medicine 4/1/19 for testicle pain and scrotal swelling. Scrotal US was completed.   Scrotal US 4/2/19   FINDINGS:  Right Testicle:  *Size: 5.7 x 3 x 2.8 cm  *Appearance: Normal.  *Flow: Normal arterial and venous flow  *Epididymis: Left epididymal appendage with a small cyst.  *Hydrocele: Large.  *Varicocele: None..    Left Testicle:  *Size: 4.8 x 3.2 x 3.3 cm  *Appearance: Normal.  *Flow: Normal arterial and venous flow  *Epididymis: Normal.  *Hydrocele: Moderate  *Varicocele: None.    Today patient reports right testicle pain and scrotal swelling that started over a week ago. He states similar episode occurred following vasectomy 20 years ago. He reports occasional dysuria, not with every void, associated with testicle pain/scrotal swelling. He has been taking ibuprofen every 6 hours for pain with minimal relief. Denies fever or chills. Denies hematuria or flank pain. Denies penile pain or discharge. Denies pain to left testicle.   Baseline urinary symptoms include frequency and nocturia x 3 that have been occurring for a few years now. Denies worsening of baseline symptoms. Denies urgency or urinary incontinence. FOS good, denies intermittent stream or hesitancy. Symptoms are not bothersome to patient.    Denies family history of prostate cancer.      REVIEW OF SYSTEMS:    Review of Systems    Constitutional: Negative for fever and chills.   HENT: Negative for hearing loss.   Eyes: Negative for visual disturbance.   Respiratory: Negative for shortness of breath.   Cardiovascular: Negative for chest pain.   Gastrointestinal: Negative for nausea, vomiting, and constipation.   Genitourinary:  See above   Neurological: Negative for  dizziness.   Hematological: Does not bruise/bleed easily.   Psychiatric/Behavioral: Negative for confusion.       PATIENT HISTORY:    Past Medical History:   Diagnosis Date    Anxiety     Hyperlipidemia     Hypertension     Insomnia     Joint pain     Knee pain     Prediabetes        Past Surgical History:   Procedure Laterality Date    BACK SURGERY      COLONOSCOPY N/A 2/13/2017    [facility] ([facility] F[facility])    ESOPHAGOGASTRODUODENOSCOPY (EGD) N/A 2/13/2017    Per[facility] by [facility] at [facility] (4TH FLR)    TONSILLECTOMY      VASECTOMY      WISDOM TOOTH EXTRACTION         Family History   Problem Relation Age of Onset    Headaches Mother     Heart disease Father        Social History     Socioeconomic History    Marital status:      Spouse name: Not on file    Number of children: Not on file    Years of education: Not on file    Highest education level: Not on file   Occupational History    Not on file   Social Needs    Financial resource strain: Not on file    Food insecurity:     Worry: Not on file     Inability: Not on file    Transportation needs:     Medical: Not on file     Non-medical: Not on file   Tobacco Use    Smoking status: Never Smoker    Smokeless tobacco: Never Used   Substance and Sexual Activity    Alcohol use: Yes     Comment: occasional     Drug use: No    Sexual activity: Yes   Lifestyle    Physical activity:     Days per week: Not on file     Minutes per session: Not on file    Stress: Not on file   Relationships    Social connections:     Talks on phone: Not on file     Gets together: Not on file     Attends Shinto service: Not on file     Active member of club or organization: Not on file     Attends meetings of clubs or organizations: Not on file     Relationship status: Not on file   Other Topics Concern    Not on file   Social History Narrative    Not on file       Allergies:  Patient has no known  allergies.    Medications:    Current Outpatient Medications:     ascorbic acid, vitamin C, (VITAMIN C) 100 MG tablet, Take 100 mg by mouth once daily., Disp: , Rfl:     atorvastatin (LIPITOR) 80 MG tablet, Take 1 tablet (80 mg total) by mouth nightly., Disp: 90 tablet, Rfl: 3    butalbital-acetaminophen-caffeine -40 mg (FIORICET, ESGIC) -40 mg per tablet, Take 1 tablet by mouth every 4 (four) hours as needed for Pain., Disp: 120 tablet, Rfl: 2    CYANOCOBALAMIN, VITAMIN B-12, (VITAMIN B-12 ORAL), Take by mouth., Disp: , Rfl:     divalproex ER (DEPAKOTE) 500 MG Tb24, TAKE 2 TABLETS ONCE DAILY TO PREVENT MIGRAINE, Disp: 180 tablet, Rfl: 0    ferrous sulfate 325 mg (65 mg iron) Tab tablet, Take 1 tablet (325 mg total) by mouth daily with breakfast., Disp: , Rfl: 0    FLUoxetine 40 MG capsule, Take 1 capsule (40 mg total) by mouth every morning., Disp: 90 capsule, Rfl: 3    fluticasone (FLONASE) 50 mcg/actuation nasal spray, USE 2 SPRAYS IN EACH NOSTRIL ONCE DAILY, Disp: 48 g, Rfl: 3    metFORMIN (GLUCOPHAGE-XR) 500 MG 24 hr tablet, TAKE 1 TABLET TWICE A DAY WITH MEALS, Disp: 180 tablet, Rfl: 1    multivitamin (ONE DAILY MULTIVITAMIN) per tablet, Take 1 tablet by mouth once daily., Disp: , Rfl:     omeprazole (PRILOSEC) 20 MG capsule, TAKE 1 CAPSULE EVERY MORNING, Disp: 90 capsule, Rfl: 3    ondansetron (ZOFRAN-ODT) 8 MG TbDL, Take 1 tablet (8 mg total) by mouth every 8 (eight) hours as needed., Disp: 30 tablet, Rfl: 2    SAW PALMETTO ORAL, Take by mouth., Disp: , Rfl:     tizanidine (ZANAFLEX) 2 MG tablet, 1-2 pills around bedtime as needed for headache or neck pain, Disp: 60 tablet, Rfl: 5    traZODone (DESYREL) 50 MG tablet, Take 1 tablet (50 mg total) by mouth nightly as needed for Insomnia., Disp: 90 tablet, Rfl: 3    verapamil (VERELAN) 240 MG C24P, TAKE 1 CAPSULE DAILY AS NEEDED (BLOOD PRESSURE CONTROL AND MIGRAINE PREVENTION), Disp: 90 capsule, Rfl: 3    oxaprozin (DAYPRO) 600 mg  tablet, Take 1 tablet (600 mg total) by mouth 2 (two) times daily with meals. for 14 days, Disp: 28 tablet, Rfl: 0    rizatriptan (MAXALT) 10 MG tablet, Take 1 tablet (10 mg total) by mouth once as needed for Migraine. Only one tablet per 24 hours, Disp: 18 tablet, Rfl: 6    sildenafil (VIAGRA) 100 MG tablet, Take 1 tablet (100 mg total) by mouth daily as needed for Erectile Dysfunction., Disp: 9 tablet, Rfl: 5    sulfamethoxazole-trimethoprim 800-160mg (BACTRIM DS) 800-160 mg Tab, Take 1 tablet by mouth 2 (two) times daily. for 10 days, Disp: 20 tablet, Rfl: 0    PHYSICAL EXAMINATION:    Constitutional: He is oriented to person, place, and time. He appears well-developed and well-nourished.  He is in no apparent distress.    Neck: Normal ROM.     Cardiovascular: Normal rate.      Pulmonary/Chest: Effort normal. No respiratory distress.     Abdominal:  He exhibits no distension.  There is no CVA tenderness.     Lymphadenopathy:        Right: No supraclavicular adenopathy present.        Left: No supraclavicular adenopathy present.     Neurological: He is alert and oriented to person, place, and time.     Skin: Skin is warm and dry.     Extremities: Normal ROM    Psych: Cooperative with normal affect.    Genitourinary: The penis is circumcised. The urethral meatus is normal.   Right testicle - + tenderness and swelling.   Left testicle - no tenderness, minimal swelling  Bilateral hydrocele R>L    The prostate is 30 g.  Prostate is smooth, non tender with no nodules noted.    Physical Exam      LABS:    UA completed 4/1/19 normal    Lab Results   Component Value Date    PSA 1.1 02/27/2018    PSA 1.0 01/21/2017    PSA 0.92 05/02/2015       IMPRESSION:    Encounter Diagnoses   Name Primary?    Pain in right testicle Yes    Scrotal swelling     Right hydrocele     Left hydrocele          PLAN:  -Discussed hydrocele with patient. No surgical intervention needed unless size or discomfort becomes bothersome to  patient. Will refer to Urology physician to discuss surgery if needed.    -Testicle pain  Discussed conservative measures for relieving testicular pain - scrotal support, scrotal elevation, ice packs as needed  Discussed side effects, indications, and MOA for daypro. Prescription sent to the pharmacy. Pt verbalized understanding. Take with meals.   Discussed side effects, indications, and MOA for bactrim. Prescription sent to the pharmacy. Pt verbalized understanding.    -BPH/LUTS  Will continue to monitor LUTS since not bothersome to patient  Discussed behavioral and dietary modifications: reduce PM fluids, avoid bladder irritants, void before bed, monitor salt intake  PSA scheduled to complete 4/24/19    -RTC 4 weeks or worsening of symptoms        I spent 35 minutes with the patient of which more than half was spent in coordinating the patient's care as well as in direct consultation with the patient in regards to our treatment and plan.

## 2019-04-03 NOTE — LETTER
April 3, 2019      Ary Chapin PA-C  [address]  [place] [address]           [facility] 4th Floor  [address]  [place] [address]  Phone: [phone]          Patient: David Nicole Jr.   MR Number: [id number]   YOB: 1960   Date of Visit: 4/3/2019       Dear Ary Chapin:    Thank you for referring David Nicole to me for evaluation. Attached you will find relevant portions of my assessment and plan of care.    If you have questions, please do not hesitate to call me. I look forward to following David Nicole along with you.    Sincerely,    Janet Browne, JOSEPH    Enclosure  CC:  No Recipients    If you would like to receive this communication electronically, please contact [email or web address] or [phone] to request more information on [employer] Link access.    For providers and/or their staff who would like to refer a patient to [facility], please contact us through our one-stop-shop provider referral line, [facility], at [phone].    If you feel you have received this communication in error or would no longer like to receive these types of communications, please e-mail [email or web address]

## 2019-04-03 NOTE — PATIENT INSTRUCTIONS
Good scrotal support  Scrotal elevation at night  Ice packs as needed for pain    Take daypro with meals twice a day

## 2019-04-04 LAB — BACTERIA UR CULT: NO GROWTH

## 2019-04-05 ENCOUNTER — TELEPHONE (OUTPATIENT)
Dept: PEDIATRIC UROLOGY | Facility: CLINIC | Age: 59
End: 2019-04-05

## 2019-04-05 NOTE — TELEPHONE ENCOUNTER
Spoke with pt wife and notified her of negative urine cx and continue to take bactrim and daypro as ordered. Pt wife will relay message and voiced understanding      ----- Message from Janet Browne NP sent at 4/5/2019  7:37 AM CDT -----  Please notify patient his urine culture was negative for infection. Continue bactrim and daypro as ordered

## 2019-04-24 ENCOUNTER — LAB VISIT (OUTPATIENT)
Dept: LAB | Facility: HOSPITAL | Age: 59
End: 2019-04-24
Attending: INTERNAL MEDICINE
Payer: COMMERCIAL

## 2019-04-24 DIAGNOSIS — Z79.899 HIGH RISK MEDICATION USE: ICD-10-CM

## 2019-04-24 DIAGNOSIS — E78.2 HYPERLIPIDEMIA, MIXED: ICD-10-CM

## 2019-04-24 DIAGNOSIS — Z12.5 SCREENING FOR PROSTATE CANCER: ICD-10-CM

## 2019-04-24 DIAGNOSIS — E61.1 IRON DEFICIENCY: ICD-10-CM

## 2019-04-24 DIAGNOSIS — I10 ESSENTIAL HYPERTENSION: ICD-10-CM

## 2019-04-24 DIAGNOSIS — Z86.39 H/O TYPE 2 DIABETES MELLITUS: ICD-10-CM

## 2019-04-24 DIAGNOSIS — D64.9 ANEMIA, UNSPECIFIED TYPE: ICD-10-CM

## 2019-04-24 DIAGNOSIS — R73.02 GLUCOSE INTOLERANCE (IMPAIRED GLUCOSE TOLERANCE): ICD-10-CM

## 2019-04-24 LAB
ALBUMIN SERPL BCP-MCNC: 3.3 G/DL (ref 3.5–5.2)
ALP SERPL-CCNC: 68 U/L (ref 55–135)
ALT SERPL W/O P-5'-P-CCNC: 20 U/L (ref 10–44)
ANION GAP SERPL CALC-SCNC: 9 MMOL/L (ref 8–16)
ANISOCYTOSIS BLD QL SMEAR: SLIGHT
AST SERPL-CCNC: 23 U/L (ref 10–40)
BASOPHILS # BLD AUTO: 0.07 K/UL (ref 0–0.2)
BASOPHILS NFR BLD: 0.8 % (ref 0–1.9)
BILIRUB SERPL-MCNC: 0.4 MG/DL (ref 0.1–1)
BUN SERPL-MCNC: 20 MG/DL (ref 6–20)
CALCIUM SERPL-MCNC: 9 MG/DL (ref 8.7–10.5)
CHLORIDE SERPL-SCNC: 105 MMOL/L (ref 95–110)
CHOLEST SERPL-MCNC: 136 MG/DL (ref 120–199)
CHOLEST/HDLC SERPL: 4.3 {RATIO} (ref 2–5)
CO2 SERPL-SCNC: 27 MMOL/L (ref 23–29)
COMPLEXED PSA SERPL-MCNC: 1.3 NG/ML (ref 0–4)
CREAT SERPL-MCNC: 0.8 MG/DL (ref 0.5–1.4)
DACRYOCYTES BLD QL SMEAR: ABNORMAL
DIFFERENTIAL METHOD: ABNORMAL
EOSINOPHIL # BLD AUTO: 0.1 K/UL (ref 0–0.5)
EOSINOPHIL NFR BLD: 1.5 % (ref 0–8)
ERYTHROCYTE [DISTWIDTH] IN BLOOD BY AUTOMATED COUNT: 19.1 % (ref 11.5–14.5)
EST. GFR  (AFRICAN AMERICAN): >60 ML/MIN/1.73 M^2
EST. GFR  (NON AFRICAN AMERICAN): >60 ML/MIN/1.73 M^2
ESTIMATED AVG GLUCOSE: 126 MG/DL (ref 68–131)
FERRITIN SERPL-MCNC: 4 NG/ML (ref 20–300)
GIANT PLATELETS BLD QL SMEAR: PRESENT
GLUCOSE SERPL-MCNC: 99 MG/DL (ref 70–110)
HBA1C MFR BLD HPLC: 6 % (ref 4–5.6)
HCT VFR BLD AUTO: 36.9 % (ref 40–54)
HDLC SERPL-MCNC: 32 MG/DL (ref 40–75)
HDLC SERPL: 23.5 % (ref 20–50)
HGB BLD-MCNC: 10.8 G/DL (ref 14–18)
LDLC SERPL CALC-MCNC: 89.4 MG/DL (ref 63–159)
LYMPHOCYTES # BLD AUTO: 2.6 K/UL (ref 1–4.8)
LYMPHOCYTES NFR BLD: 30.1 % (ref 18–48)
MCH RBC QN AUTO: 22 PG (ref 27–31)
MCHC RBC AUTO-ENTMCNC: 29.3 G/DL (ref 32–36)
MCV RBC AUTO: 75 FL (ref 82–98)
MONOCYTES # BLD AUTO: 1.1 K/UL (ref 0.3–1)
MONOCYTES NFR BLD: 12.6 % (ref 4–15)
NEUTROPHILS # BLD AUTO: 4.6 K/UL (ref 1.8–7.7)
NEUTROPHILS NFR BLD: 55 % (ref 38–73)
NONHDLC SERPL-MCNC: 104 MG/DL
OVALOCYTES BLD QL SMEAR: ABNORMAL
PLATELET # BLD AUTO: 327 K/UL (ref 150–350)
PLATELET BLD QL SMEAR: ABNORMAL
PMV BLD AUTO: 9.7 FL (ref 9.2–12.9)
POIKILOCYTOSIS BLD QL SMEAR: SLIGHT
POLYCHROMASIA BLD QL SMEAR: ABNORMAL
POTASSIUM SERPL-SCNC: 4.7 MMOL/L (ref 3.5–5.1)
PROT SERPL-MCNC: 6.4 G/DL (ref 6–8.4)
RBC # BLD AUTO: 4.91 M/UL (ref 4.6–6.2)
SODIUM SERPL-SCNC: 141 MMOL/L (ref 136–145)
TRIGL SERPL-MCNC: 73 MG/DL (ref 30–150)
TSH SERPL DL<=0.005 MIU/L-ACNC: 2.2 UIU/ML (ref 0.4–4)
WBC # BLD AUTO: 8.48 K/UL (ref 3.9–12.7)

## 2019-04-24 PROCEDURE — 80053 COMPREHEN METABOLIC PANEL: CPT

## 2019-04-24 PROCEDURE — 82728 ASSAY OF FERRITIN: CPT

## 2019-04-24 PROCEDURE — 36415 COLL VENOUS BLD VENIPUNCTURE: CPT

## 2019-04-24 PROCEDURE — 83036 HEMOGLOBIN GLYCOSYLATED A1C: CPT

## 2019-04-24 PROCEDURE — 84153 ASSAY OF PSA TOTAL: CPT

## 2019-04-24 PROCEDURE — 80061 LIPID PANEL: CPT

## 2019-04-24 PROCEDURE — 85025 COMPLETE CBC W/AUTO DIFF WBC: CPT

## 2019-04-24 PROCEDURE — 84443 ASSAY THYROID STIM HORMONE: CPT

## 2019-05-01 ENCOUNTER — PATIENT OUTREACH (OUTPATIENT)
Dept: ADMINISTRATIVE | Facility: HOSPITAL | Age: 59
End: 2019-05-01

## 2019-05-01 PROBLEM — H40.009 GLAUCOMA SUSPECT: Status: ACTIVE | Noted: 2019-05-01

## 2019-05-01 PROBLEM — D64.9 ANEMIA: Status: RESOLVED | Noted: 2017-02-07 | Resolved: 2019-05-01

## 2019-05-01 PROBLEM — I10 ESSENTIAL HYPERTENSION: Status: ACTIVE | Noted: 2019-05-01

## 2019-05-01 PROBLEM — K44.9 HIATAL HERNIA: Status: ACTIVE | Noted: 2019-05-01

## 2019-05-01 PROBLEM — E61.1 IRON DEFICIENCY: Status: RESOLVED | Noted: 2017-02-17 | Resolved: 2019-05-01

## 2019-05-01 PROBLEM — D50.9 IRON DEFICIENCY ANEMIA: Status: ACTIVE | Noted: 2019-05-01

## 2019-05-01 NOTE — PROGRESS NOTES
Chart review completed. Immunizations reconciled, HM modifiers updated, HM duplicate entries deleted, care team updated, old orders deleted. Pt currently up to date on health maintenance screenings.

## 2019-05-02 ENCOUNTER — OFFICE VISIT (OUTPATIENT)
Dept: INTERNAL MEDICINE | Facility: CLINIC | Age: 59
End: 2019-05-02
Payer: COMMERCIAL

## 2019-05-02 VITALS
OXYGEN SATURATION: 98 % | HEIGHT: 67 IN | SYSTOLIC BLOOD PRESSURE: 130 MMHG | WEIGHT: 239.44 LBS | BODY MASS INDEX: 37.58 KG/M2 | DIASTOLIC BLOOD PRESSURE: 74 MMHG | HEART RATE: 92 BPM

## 2019-05-02 DIAGNOSIS — Z79.899 LONG-TERM USE OF HIGH-RISK MEDICATION: ICD-10-CM

## 2019-05-02 DIAGNOSIS — Z79.899 HIGH RISK MEDICATION USE: ICD-10-CM

## 2019-05-02 DIAGNOSIS — H40.003 GLAUCOMA SUSPECT OF BOTH EYES: ICD-10-CM

## 2019-05-02 DIAGNOSIS — G43.111 INTRACTABLE MIGRAINE WITH AURA WITH STATUS MIGRAINOSUS: ICD-10-CM

## 2019-05-02 DIAGNOSIS — I10 ESSENTIAL HYPERTENSION: ICD-10-CM

## 2019-05-02 DIAGNOSIS — D50.9 IRON DEFICIENCY ANEMIA, UNSPECIFIED IRON DEFICIENCY ANEMIA TYPE: ICD-10-CM

## 2019-05-02 DIAGNOSIS — R73.02 GLUCOSE INTOLERANCE (IMPAIRED GLUCOSE TOLERANCE): ICD-10-CM

## 2019-05-02 DIAGNOSIS — E78.2 HYPERLIPIDEMIA, MIXED: ICD-10-CM

## 2019-05-02 DIAGNOSIS — Z00.00 ANNUAL PHYSICAL EXAM: Primary | ICD-10-CM

## 2019-05-02 DIAGNOSIS — Z87.11 H/O PEPTIC ULCER: ICD-10-CM

## 2019-05-02 DIAGNOSIS — Z12.5 SCREENING FOR PROSTATE CANCER: ICD-10-CM

## 2019-05-02 DIAGNOSIS — Z86.39 H/O TYPE 2 DIABETES MELLITUS: ICD-10-CM

## 2019-05-02 DIAGNOSIS — K44.9 HIATAL HERNIA: ICD-10-CM

## 2019-05-02 PROCEDURE — 99396 PR PREVENTIVE VISIT,EST,40-64: ICD-10-PCS | Mod: S$GLB,,, | Performed by: INTERNAL MEDICINE

## 2019-05-02 PROCEDURE — 99999 PR PBB SHADOW E&M-EST. PATIENT-LVL III: CPT | Mod: PBBFAC,,, | Performed by: INTERNAL MEDICINE

## 2019-05-02 PROCEDURE — 99999 PR PBB SHADOW E&M-EST. PATIENT-LVL III: ICD-10-PCS | Mod: PBBFAC,,, | Performed by: INTERNAL MEDICINE

## 2019-05-02 PROCEDURE — 99396 PREV VISIT EST AGE 40-64: CPT | Mod: S$GLB,,, | Performed by: INTERNAL MEDICINE

## 2019-05-02 RX ORDER — ONDANSETRON 8 MG/1
8 TABLET, ORALLY DISINTEGRATING ORAL EVERY 8 HOURS PRN
Qty: 30 TABLET | Refills: 2 | Status: CANCELLED | OUTPATIENT
Start: 2019-05-02

## 2019-05-02 RX ORDER — METFORMIN HYDROCHLORIDE 500 MG/1
500 TABLET, EXTENDED RELEASE ORAL 2 TIMES DAILY WITH MEALS
Qty: 180 TABLET | Refills: 1 | Status: SHIPPED | OUTPATIENT
Start: 2019-05-02 | End: 2019-09-17 | Stop reason: SDUPTHER

## 2019-05-02 RX ORDER — ATORVASTATIN CALCIUM 80 MG/1
80 TABLET, FILM COATED ORAL NIGHTLY
Qty: 90 TABLET | Refills: 3 | Status: SHIPPED | OUTPATIENT
Start: 2019-05-02 | End: 2020-01-29 | Stop reason: SDUPTHER

## 2019-05-02 RX ORDER — DIVALPROEX SODIUM 500 MG/1
TABLET, FILM COATED, EXTENDED RELEASE ORAL
Qty: 180 TABLET | Refills: 0 | Status: CANCELLED | OUTPATIENT
Start: 2019-05-02

## 2019-05-02 RX ORDER — DIVALPROEX SODIUM 500 MG/1
TABLET, FILM COATED, EXTENDED RELEASE ORAL
Qty: 180 TABLET | Refills: 1 | Status: SHIPPED | OUTPATIENT
Start: 2019-05-02 | End: 2020-01-29 | Stop reason: SDUPTHER

## 2019-05-02 RX ORDER — VERAPAMIL HYDROCHLORIDE 240 MG/1
CAPSULE, EXTENDED RELEASE ORAL
Qty: 90 CAPSULE | Refills: 3 | Status: SHIPPED | OUTPATIENT
Start: 2019-05-02 | End: 2020-01-29 | Stop reason: SDUPTHER

## 2019-05-02 RX ORDER — OMEPRAZOLE 20 MG/1
20 CAPSULE, DELAYED RELEASE ORAL EVERY MORNING
Qty: 90 CAPSULE | Refills: 3 | Status: SHIPPED | OUTPATIENT
Start: 2019-05-02 | End: 2020-01-29 | Stop reason: SDUPTHER

## 2019-05-02 RX ORDER — FERROUS SULFATE 325(65) MG
325 TABLET ORAL
Refills: 0 | Status: CANCELLED
Start: 2019-05-02

## 2019-05-02 RX ORDER — FLUOXETINE HYDROCHLORIDE 40 MG/1
40 CAPSULE ORAL EVERY MORNING
Qty: 90 CAPSULE | Refills: 3 | Status: SHIPPED | OUTPATIENT
Start: 2019-05-02 | End: 2020-01-29 | Stop reason: SDUPTHER

## 2019-05-02 NOTE — PROGRESS NOTES
Subjective:       Patient ID: David Nicole Jr. is a 59 y.o. male.    Chief Complaint: Annual Exam  This dictation was performed using using M[employer].    Overall he has been doing well. has a past medical history of Anxiety, Hyperlipidemia, Hypertension, Insomnia, Joint pain, Knee pain, and Prediabetes. Entire chart reviewed, PMx, FHx, and Social History updated and reviewed.      His blood work is reviewed.  He continues to donate blood once every 8 weeks.  He is advised to stop this and insisted donate platelets.  All labs reviewed.  Results for orders placed or performed in visit on 04/24/19   PSA, Screening   Result Value Ref Range    PSA, SCREEN 1.3 0.00 - 4.00 ng/mL   CBC auto differential   Result Value Ref Range    WBC 8.48 3.90 - 12.70 K/uL    RBC 4.91 4.60 - 6.20 M/uL    Hemoglobin 10.8 (L) 14.0 - 18.0 g/dL    Hematocrit 36.9 (L) 40.0 - 54.0 %    Mean Corpuscular Volume 75 (L) 82 - 98 fL    Mean Corpuscular Hemoglobin 22.0 (L) 27.0 - 31.0 pg    Mean Corpuscular Hemoglobin Conc 29.3 (L) 32.0 - 36.0 g/dL    RDW 19.1 (H) 11.5 - 14.5 %    Platelets 327 150 - 350 K/uL    MPV 9.7 9.2 - 12.9 fL    Gran # (ANC) 4.6 1.8 - 7.7 K/uL    Lymph # 2.6 1.0 - 4.8 K/uL    Mono # 1.1 (H) 0.3 - 1.0 K/uL    Eos # 0.1 0.0 - 0.5 K/uL    Baso # 0.07 0.00 - 0.20 K/uL    Gran% 55.0 38.0 - 73.0 %    Lymph% 30.1 18.0 - 48.0 %    Mono% 12.6 4.0 - 15.0 %    Eosinophil% 1.5 0.0 - 8.0 %    Basophil% 0.8 0.0 - 1.9 %    Platelet Estimate Appears normal     Aniso Slight     Poik Slight     Poly Occasional     Ovalocytes Occasional     Tear Drop Cells Occasional     Large/Giant Platelets Present     Differential Method Automated    Ferritin   Result Value Ref Range    Ferritin 4 (L) 20.0 - 300.0 ng/mL   Comprehensive metabolic panel   Result Value Ref Range    Sodium 141 136 - 145 mmol/L    Potassium 4.7 3.5 - 5.1 mmol/L    Chloride 105 95 - 110 mmol/L    CO2 27 23 - 29 mmol/L    Glucose 99 70 - 110 mg/dL    BUN, Bld 20 6 - 20 mg/dL     Creatinine 0.8 0.5 - 1.4 mg/dL    Calcium 9.0 8.7 - 10.5 mg/dL    Total Protein 6.4 6.0 - 8.4 g/dL    Albumin 3.3 (L) 3.5 - 5.2 g/dL    Total Bilirubin 0.4 0.1 - 1.0 mg/dL    Alkaline Phosphatase 68 55 - 135 U/L    AST 23 10 - 40 U/L    ALT 20 10 - 44 U/L    Anion Gap 9 8 - 16 mmol/L    eGFR if African American >60 >60 mL/min/1.73 m^2    eGFR if non African American >60 >60 mL/min/1.73 m^2   Lipid panel   Result Value Ref Range    Cholesterol 136 120 - 199 mg/dL    Triglycerides 73 30 - 150 mg/dL    HDL 32 (L) 40 - 75 mg/dL    LDL Cholesterol 89.4 63.0 - 159.0 mg/dL    Hdl/Cholesterol Ratio 23.5 20.0 - 50.0 %    Total Cholesterol/HDL Ratio 4.3 2.0 - 5.0    Non-HDL Cholesterol 104 mg/dL   Hemoglobin A1c   Result Value Ref Range    Hemoglobin A1C 6.0 (H) 4.0 - 5.6 %    Estimated Avg Glucose 126 68 - 131 mg/dL   TSH   Result Value Ref Range    TSH 2.201 0.400 - 4.000 uIU/mL       HPI  Review of Systems   Constitutional: Negative for activity change, appetite change, chills, fatigue, fever and unexpected weight change.   HENT: Negative for congestion, dental problem, hearing loss, tinnitus, trouble swallowing and voice change.    Eyes: Negative for visual disturbance.   Respiratory: Negative for cough, chest tightness, shortness of breath and wheezing.    Cardiovascular: Negative for chest pain, palpitations and leg swelling.   Gastrointestinal: Negative for abdominal pain, blood in stool, constipation, diarrhea, nausea and vomiting.   Genitourinary: Negative for difficulty urinating, dysuria, flank pain, frequency and urgency.   Musculoskeletal: Negative for arthralgias, back pain, gait problem, joint swelling, myalgias, neck pain and neck stiffness.   Skin: Negative for rash.   Neurological: Negative for tremors, light-headedness, numbness and headaches.   Psychiatric/Behavioral: Negative for dysphoric mood and sleep disturbance. The patient is not nervous/anxious.        Objective:      Physical Exam    Constitutional: He is oriented to person, place, and time. No distress.   HENT:   Head: Atraumatic.   Eyes: Conjunctivae are normal. No scleral icterus.   Neck: Neck supple.   Cardiovascular: Normal rate and regular rhythm.   Pulmonary/Chest: Effort normal and breath sounds normal.   Abdominal: Soft. There is no tenderness.   Musculoskeletal: He exhibits no edema.   Lymphadenopathy:     He has no cervical adenopathy.   Neurological: He is alert and oriented to person, place, and time.   Skin: Skin is warm and dry.   Psychiatric: He has a normal mood and affect. His behavior is normal.   Vitals reviewed.      Assessment:       1. Annual physical exam    2. H/O type 2 diabetes mellitus    3. Glucose intolerance (impaired glucose tolerance)    4. H/O peptic ulcer    5. High risk medication use    6. Essential hypertension    7. Iron deficiency anemia, unspecified iron deficiency anemia type    8. Hyperlipidemia, mixed    9. Intractable migraine with aura with status migrainosus    10. Long-term use of high-risk medication, PPI    11. Glaucoma suspect of both eyes, Dr. Poe    12. Hiatal hernia    13. Screening for prostate cancer        Plan:   David was seen today for annual exam.    Diagnoses and all orders for this visit:    Annual physical exam    H/O type 2 diabetes mellitus  -     Hemoglobin A1c; Future    Glucose intolerance (impaired glucose tolerance)    H/O peptic ulcer    High risk medication use  -     Vitamin D; Future  -     Comprehensive metabolic panel; Future    Essential hypertension    Iron deficiency anemia, unspecified iron deficiency anemia type  -     CBC auto differential; Future  -     Ferritin; Future    Hyperlipidemia, mixed    Intractable migraine with aura with status migrainosus    Long-term use of high-risk medication, PPI    Glaucoma suspect of both eyes, Dr. Poe    Hiatal hernia    Screening for prostate cancer    Other orders  -     atorvastatin (LIPITOR) 80 MG tablet; Take 1  tablet (80 mg total) by mouth nightly.  -     Cancel: divalproex ER (DEPAKOTE) 500 MG Tb24  -     FLUoxetine 40 MG capsule; Take 1 capsule (40 mg total) by mouth every morning.  -     metFORMIN (GLUCOPHAGE-XR) 500 MG 24 hr tablet; Take 1 tablet (500 mg total) by mouth 2 (two) times daily with meals.  -     omeprazole (PRILOSEC) 20 MG capsule; Take 1 capsule (20 mg total) by mouth every morning. To control GERD  -     Cancel: ferrous sulfate (FEOSOL) 325 mg (65 mg iron) Tab tablet; Take 1 tablet (325 mg total) by mouth daily with breakfast.  -     verapamil (VERELAN) 240 MG C24P; TAKE 1 CAPSULE DAILY AS NEEDED (BLOOD PRESSURE CONTROL AND MIGRAINE PREVENTION)  -     Cancel: ondansetron (ZOFRAN-ODT) 8 MG TbDL; Take 1 tablet (8 mg total) by mouth every 8 (eight) hours as needed.  -     divalproex ER (DEPAKOTE) 500 MG Tb24; TAKE 2 TABLETS ONCE DAILY TO PREVENT MIGRAINE      Medication List with Changes/Refills   Current Medications    ASCORBIC ACID, VITAMIN C, (VITAMIN C) 100 MG TABLET    Take 100 mg by mouth once daily.    CYANOCOBALAMIN, VITAMIN B-12, (VITAMIN B-12 ORAL)    Take by mouth.    FLUTICASONE (FLONASE) 50 MCG/ACTUATION NASAL SPRAY    USE 2 SPRAYS IN EACH NOSTRIL ONCE DAILY    MULTIVITAMIN (ONE DAILY MULTIVITAMIN) PER TABLET    Take 1 tablet by mouth once daily.   Changed and/or Refilled Medications    Modified Medication Previous Medication    ATORVASTATIN (LIPITOR) 80 MG TABLET atorvastatin (LIPITOR) 80 MG tablet       Take 1 tablet (80 mg total) by mouth nightly.    Take 1 tablet (80 mg total) by mouth nightly.    DIVALPROEX ER (DEPAKOTE) 500 MG TB24 divalproex ER (DEPAKOTE) 500 MG Tb24       TAKE 2 TABLETS ONCE DAILY TO PREVENT MIGRAINE    TAKE 2 TABLETS ONCE DAILY TO PREVENT MIGRAINE    FLUOXETINE 40 MG CAPSULE FLUoxetine 40 MG capsule       Take 1 capsule (40 mg total) by mouth every morning.    Take 1 capsule (40 mg total) by mouth every morning.    METFORMIN (GLUCOPHAGE-XR) 500 MG 24 HR TABLET  metFORMIN (GLUCOPHAGE-XR) 500 MG 24 hr tablet       Take 1 tablet (500 mg total) by mouth 2 (two) times daily with meals.    TAKE 1 TABLET TWICE A DAY WITH MEALS    OMEPRAZOLE (PRILOSEC) 20 MG CAPSULE omeprazole (PRILOSEC) 20 MG capsule       Take 1 capsule (20 mg total) by mouth every morning. To control GERD    TAKE 1 CAPSULE EVERY MORNING    VERAPAMIL (VERELAN) 240 MG C24P verapamil (VERELAN) 240 MG C24P       TAKE 1 CAPSULE DAILY AS NEEDED (BLOOD PRESSURE CONTROL AND MIGRAINE PREVENTION)    TAKE 1 CAPSULE DAILY AS NEEDED (BLOOD PRESSURE CONTROL AND MIGRAINE PREVENTION)   Discontinued Medications    BUTALBITAL-ACETAMINOPHEN-CAFFEINE -40 MG (FIORICET, ESGIC) -40 MG PER TABLET    Take 1 tablet by mouth every 4 (four) hours as needed for Pain.    FERROUS SULFATE 325 MG (65 MG IRON) TAB TABLET    Take 1 tablet (325 mg total) by mouth daily with breakfast.    ONDANSETRON (ZOFRAN-ODT) 8 MG TBDL    Take 1 tablet (8 mg total) by mouth every 8 (eight) hours as needed.    RIZATRIPTAN (MAXALT) 10 MG TABLET    Take 1 tablet (10 mg total) by mouth once as needed for Migraine. Only one tablet per 24 hours    SAW PALMETTO ORAL    Take by mouth.    SILDENAFIL (VIAGRA) 100 MG TABLET    Take 1 tablet (100 mg total) by mouth daily as needed for Erectile Dysfunction.    TIZANIDINE (ZANAFLEX) 2 MG TABLET    1-2 pills around bedtime as needed for headache or neck pain    TRAZODONE (DESYREL) 50 MG TABLET    Take 1 tablet (50 mg total) by mouth nightly as needed for Insomnia.       Follow up in about 6 months (around 11/2/2019) for after blood tests and also in one year.

## 2019-05-07 ENCOUNTER — TELEPHONE (OUTPATIENT)
Dept: INTERNAL MEDICINE | Facility: CLINIC | Age: 59
End: 2019-05-07

## 2019-05-07 NOTE — TELEPHONE ENCOUNTER
Called and spoke with pharmacist Moraima, and verified that the patients Verapmil 240MG (prn for BP control and migrainePrevention) is described correctly. [facility] is a new pharmacy for the patient.

## 2019-09-17 RX ORDER — METFORMIN HYDROCHLORIDE 500 MG/1
TABLET, EXTENDED RELEASE ORAL
Qty: 180 TABLET | Refills: 1 | Status: SHIPPED | OUTPATIENT
Start: 2019-09-17 | End: 2020-01-29 | Stop reason: SDUPTHER

## 2019-10-17 ENCOUNTER — OFFICE VISIT (OUTPATIENT)
Dept: URGENT CARE | Facility: CLINIC | Age: 59
End: 2019-10-17
Payer: COMMERCIAL

## 2019-10-17 VITALS
DIASTOLIC BLOOD PRESSURE: 78 MMHG | OXYGEN SATURATION: 98 % | WEIGHT: 220 LBS | SYSTOLIC BLOOD PRESSURE: 134 MMHG | BODY MASS INDEX: 34.53 KG/M2 | RESPIRATION RATE: 20 BRPM | HEIGHT: 67 IN | TEMPERATURE: 99 F | HEART RATE: 81 BPM

## 2019-10-17 DIAGNOSIS — J32.9 BACTERIAL SINUSITIS: Primary | ICD-10-CM

## 2019-10-17 DIAGNOSIS — B96.89 BACTERIAL SINUSITIS: Primary | ICD-10-CM

## 2019-10-17 LAB
CTP QC/QA: YES
FLUAV AG NPH QL: NEGATIVE
FLUBV AG NPH QL: NEGATIVE

## 2019-10-17 PROCEDURE — 87804 INFLUENZA ASSAY W/OPTIC: CPT | Mod: QW,S$GLB,, | Performed by: PHYSICIAN ASSISTANT

## 2019-10-17 PROCEDURE — 87804 POCT INFLUENZA A/B: ICD-10-PCS | Mod: QW,S$GLB,, | Performed by: PHYSICIAN ASSISTANT

## 2019-10-17 PROCEDURE — 99214 PR OFFICE/OUTPT VISIT, EST, LEVL IV, 30-39 MIN: ICD-10-PCS | Mod: S$GLB,,, | Performed by: PHYSICIAN ASSISTANT

## 2019-10-17 PROCEDURE — 99214 OFFICE O/P EST MOD 30 MIN: CPT | Mod: S$GLB,,, | Performed by: PHYSICIAN ASSISTANT

## 2019-10-17 RX ORDER — AMOXICILLIN AND CLAVULANATE POTASSIUM 875; 125 MG/1; MG/1
1 TABLET, FILM COATED ORAL 2 TIMES DAILY
Qty: 14 TABLET | Refills: 0 | Status: SHIPPED | OUTPATIENT
Start: 2019-10-17 | End: 2019-10-24

## 2019-10-17 NOTE — PROGRESS NOTES
"Subjective:       Patient ID: David Nicole Jr. is a 59 y.o. male.    Vitals:  height is 5' 7" (1.702 m) and weight is 99.8 kg (220 lb). His oral temperature is 98.5 °F (36.9 °C). His blood pressure is 134/78 and his pulse is 81. His respiration is 20 and oxygen saturation is 98%.     Chief Complaint: URI    This is a 59 y.o. male   who presents today with a chief complaint of cold symptoms that began a day ago. He's complaining of congestion, sore throat, sinus pain and sinus pressure. He had a bout of diarrhea a day ago. He took tylenol this morning to help relieve his symptoms.     URI    This is a new problem. The current episode started yesterday. The problem has been unchanged. There has been no fever. Associated symptoms include congestion, diarrhea, ear pain, headaches, nausea, sinus pain and a sore throat. Pertinent negatives include no coughing, rash, vomiting or wheezing. Treatments tried: tylenol. The treatment provided no relief.       Constitution: Positive for fatigue. Negative for chills, sweating and fever.   HENT: Positive for ear pain, congestion, sinus pain, sinus pressure and sore throat. Negative for voice change.    Neck: Negative for painful lymph nodes.   Eyes: Negative for eye redness.   Respiratory: Negative for chest tightness, cough, sputum production, bloody sputum, COPD, shortness of breath, stridor, wheezing and asthma.    Gastrointestinal: Positive for nausea and diarrhea. Negative for vomiting.   Musculoskeletal: Positive for muscle ache.   Skin: Negative for rash.   Allergic/Immunologic: Negative for seasonal allergies and asthma.   Neurological: Positive for headaches.   Hematologic/Lymphatic: Negative for swollen lymph nodes.       Objective:      Physical Exam   Constitutional: He is oriented to person, place, and time. He appears well-developed and well-nourished. He is cooperative. He does not appear ill. No distress.   HENT:   Head: Normocephalic and atraumatic.   Right " Ear: Hearing, tympanic membrane, external ear and ear canal normal.   Left Ear: Hearing, tympanic membrane, external ear and ear canal normal.   Nose: Mucosal edema and rhinorrhea present. Right sinus exhibits maxillary sinus tenderness. Right sinus exhibits no frontal sinus tenderness. Left sinus exhibits maxillary sinus tenderness. Left sinus exhibits no frontal sinus tenderness.   Mouth/Throat: Uvula is midline, oropharynx is clear and moist and mucous membranes are normal. No oropharyngeal exudate, posterior oropharyngeal edema, posterior oropharyngeal erythema or cobblestoning.   Eyes: Conjunctivae, EOM and lids are normal.   Neck: Trachea normal, full passive range of motion without pain and phonation normal. Neck supple.   Cardiovascular: Regular rhythm and normal heart sounds. Exam reveals no gallop.   No murmur heard.  Pulmonary/Chest: Effort normal and breath sounds normal. No respiratory distress. He has no decreased breath sounds. He has no wheezes. He has no rhonchi. He has no rales.   Musculoskeletal: Normal range of motion.   Neurological: He is alert and oriented to person, place, and time.   Skin: Skin is warm, dry, intact and not diaphoretic.   Psychiatric: He has a normal mood and affect. His speech is normal and behavior is normal. Judgment and thought content normal. Cognition and memory are normal.   Nursing note and vitals reviewed.        Assessment:       1. Bacterial sinusitis        Office Visit on 10/17/2019   Component Date Value Ref Range Status    Rapid Influenza A Ag 10/17/2019 Negative  Negative Final    Rapid Influenza B Ag 10/17/2019 Negative  Negative Final     Acceptable 10/17/2019 Yes   Final     Plan:         Bacterial sinusitis  -     POCT Influenza A/B  -     amoxicillin-clavulanate 875-125mg (AUGMENTIN) 875-125 mg per tablet; Take 1 tablet by mouth 2 (two) times daily. for 7 days  Dispense: 14 tablet; Refill: 0      Patient Instructions   -Please take  antibiotic to completion.    Below are suggestions for symptomatic relief:   -Tylenol every 4 hours OR ibuprofen every 6 hours as needed for pain/fever.   -Salt water gargles to soothe throat pain.   -Chloroseptic spray also helps to numb throat pain.   -Nasal saline spray reduces inflammation and dryness.   -Warm face compresses to help with facial sinus pain/pressure.   -Vicks vapor rub at night.   -Flonase OTC or Nasacort OTC for nasal congestion.   -Simple foods like chicken noodle soup.   -Delsym helps with coughing at night   -Zyrtec/Claritin during the day & Benadryl at night may help with allergies.     If you DO NOT have Hypertension or any history of palpitations, it is ok to take over the counter Sudafed or Mucinex D or Allegra-D or Claritin-D or Zyrtec-D.  If you do take one of the above, it is ok to combine that with plain over the counter Mucinex or Allegra or Claritin or Zyrtec. If, for example, you are taking Zyrtec -D, you can combine that with Mucinex, but not Mucinex-D.  If you are taking Mucinex-D, you can combine that with plain Allegra or Claritin or Zyrtec.   If you DO have Hypertension or palpitations, it is safe to take Coricidin HBP for relief of sinus symptoms.    Please follow up with your primary care provider within 2-5 days if your signs and symptoms have not resolved or worsen.     If your condition worsens or fails to improve we recommend that you receive another evaluation at the emergency room immediately or contact your primary medical clinic to discuss your concerns.   You must understand that you have received an Urgent Care treatment only and that you may be released before all of your medical problems are known or treated. You, the patient, will arrange for follow up care as instructed.

## 2019-10-17 NOTE — LETTER
October 17, 2019      [facility] [place]note 2[place]  [address]  [facility] [place] [address]  Phone: [phone]  Fax: [phone]       Patient: David Nicole   YOB: 1960  Date of Visit: 10/17/2019    To Whom It May Concern:    Eileen Nicole  was at [facility] on 10/17/2019. He may return to work/school on 10/19/2019 with no restrictions. If you have any questions or concerns, or if I can be of further assistance, please do not hesitate to contact me.    Sincerely,      Ariadne Moreau PA-C

## 2019-10-17 NOTE — PATIENT INSTRUCTIONS
-Please take antibiotic to completion.    Below are suggestions for symptomatic relief:   -Tylenol every 4 hours OR ibuprofen every 6 hours as needed for pain/fever.   -Salt water gargles to soothe throat pain.   -Chloroseptic spray also helps to numb throat pain.   -Nasal saline spray reduces inflammation and dryness.   -Warm face compresses to help with facial sinus pain/pressure.   -Vicks vapor rub at night.   -Flonase OTC or Nasacort OTC for nasal congestion.   -Simple foods like chicken noodle soup.   -Delsym helps with coughing at night   -Zyrtec/Claritin during the day & Benadryl at night may help with allergies.     If you DO NOT have Hypertension or any history of palpitations, it is ok to take over the counter Sudafed or Mucinex D or Allegra-D or Claritin-D or Zyrtec-D.  If you do take one of the above, it is ok to combine that with plain over the counter Mucinex or Allegra or Claritin or Zyrtec. If, for example, you are taking Zyrtec -D, you can combine that with Mucinex, but not Mucinex-D.  If you are taking Mucinex-D, you can combine that with plain Allegra or Claritin or Zyrtec.   If you DO have Hypertension or palpitations, it is safe to take Coricidin HBP for relief of sinus symptoms.    Please follow up with your primary care provider within 2-5 days if your signs and symptoms have not resolved or worsen.     If your condition worsens or fails to improve we recommend that you receive another evaluation at the emergency room immediately or contact your primary medical clinic to discuss your concerns.   You must understand that you have received an Urgent Care treatment only and that you may be released before all of your medical problems are known or treated. You, the patient, will arrange for follow up care as instructed.

## 2019-10-26 ENCOUNTER — LAB VISIT (OUTPATIENT)
Dept: LAB | Facility: HOSPITAL | Age: 59
End: 2019-10-26
Attending: INTERNAL MEDICINE
Payer: COMMERCIAL

## 2019-10-26 DIAGNOSIS — D50.9 IRON DEFICIENCY ANEMIA, UNSPECIFIED IRON DEFICIENCY ANEMIA TYPE: ICD-10-CM

## 2019-10-26 DIAGNOSIS — Z86.39 H/O TYPE 2 DIABETES MELLITUS: ICD-10-CM

## 2019-10-26 DIAGNOSIS — Z79.899 HIGH RISK MEDICATION USE: ICD-10-CM

## 2019-10-26 LAB
25(OH)D3+25(OH)D2 SERPL-MCNC: 34 NG/ML (ref 30–96)
ALBUMIN SERPL BCP-MCNC: 3.7 G/DL (ref 3.5–5.2)
ALP SERPL-CCNC: 74 U/L (ref 55–135)
ALT SERPL W/O P-5'-P-CCNC: 13 U/L (ref 10–44)
ANION GAP SERPL CALC-SCNC: 9 MMOL/L (ref 8–16)
AST SERPL-CCNC: 22 U/L (ref 10–40)
BASOPHILS # BLD AUTO: 0.08 K/UL (ref 0–0.2)
BASOPHILS NFR BLD: 1 % (ref 0–1.9)
BILIRUB SERPL-MCNC: 0.5 MG/DL (ref 0.1–1)
BUN SERPL-MCNC: 16 MG/DL (ref 6–20)
CALCIUM SERPL-MCNC: 9.3 MG/DL (ref 8.7–10.5)
CHLORIDE SERPL-SCNC: 104 MMOL/L (ref 95–110)
CO2 SERPL-SCNC: 27 MMOL/L (ref 23–29)
CREAT SERPL-MCNC: 0.8 MG/DL (ref 0.5–1.4)
DIFFERENTIAL METHOD: ABNORMAL
EOSINOPHIL # BLD AUTO: 0 K/UL (ref 0–0.5)
EOSINOPHIL NFR BLD: 0 % (ref 0–8)
ERYTHROCYTE [DISTWIDTH] IN BLOOD BY AUTOMATED COUNT: 19.8 % (ref 11.5–14.5)
EST. GFR  (AFRICAN AMERICAN): >60 ML/MIN/1.73 M^2
EST. GFR  (NON AFRICAN AMERICAN): >60 ML/MIN/1.73 M^2
ESTIMATED AVG GLUCOSE: 120 MG/DL (ref 68–131)
FERRITIN SERPL-MCNC: <1 NG/ML (ref 20–300)
GLUCOSE SERPL-MCNC: 95 MG/DL (ref 70–110)
HBA1C MFR BLD HPLC: 5.8 % (ref 4–5.6)
HCT VFR BLD AUTO: 40.2 % (ref 40–54)
HGB BLD-MCNC: 12 G/DL (ref 14–18)
IMM GRANULOCYTES # BLD AUTO: 0.06 K/UL (ref 0–0.04)
IMM GRANULOCYTES NFR BLD AUTO: 0.7 % (ref 0–0.5)
LYMPHOCYTES # BLD AUTO: 2.1 K/UL (ref 1–4.8)
LYMPHOCYTES NFR BLD: 25.5 % (ref 18–48)
MCH RBC QN AUTO: 22.3 PG (ref 27–31)
MCHC RBC AUTO-ENTMCNC: 29.9 G/DL (ref 32–36)
MCV RBC AUTO: 75 FL (ref 82–98)
MONOCYTES # BLD AUTO: 0.8 K/UL (ref 0.3–1)
MONOCYTES NFR BLD: 10.2 % (ref 4–15)
NEUTROPHILS # BLD AUTO: 5.1 K/UL (ref 1.8–7.7)
NEUTROPHILS NFR BLD: 62.6 % (ref 38–73)
NRBC BLD-RTO: 0 /100 WBC
PLATELET # BLD AUTO: 289 K/UL (ref 150–350)
PMV BLD AUTO: 11.1 FL (ref 9.2–12.9)
POTASSIUM SERPL-SCNC: 5.1 MMOL/L (ref 3.5–5.1)
PROT SERPL-MCNC: 6.9 G/DL (ref 6–8.4)
RBC # BLD AUTO: 5.39 M/UL (ref 4.6–6.2)
SODIUM SERPL-SCNC: 140 MMOL/L (ref 136–145)
WBC # BLD AUTO: 8.13 K/UL (ref 3.9–12.7)

## 2019-10-26 PROCEDURE — 85025 COMPLETE CBC W/AUTO DIFF WBC: CPT

## 2019-10-26 PROCEDURE — 80053 COMPREHEN METABOLIC PANEL: CPT

## 2019-10-26 PROCEDURE — 83036 HEMOGLOBIN GLYCOSYLATED A1C: CPT

## 2019-10-26 PROCEDURE — 82728 ASSAY OF FERRITIN: CPT

## 2019-10-26 PROCEDURE — 36415 COLL VENOUS BLD VENIPUNCTURE: CPT | Mod: PO

## 2019-10-26 PROCEDURE — 82306 VITAMIN D 25 HYDROXY: CPT

## 2019-11-06 ENCOUNTER — OFFICE VISIT (OUTPATIENT)
Dept: URGENT CARE | Facility: CLINIC | Age: 59
End: 2019-11-06
Payer: COMMERCIAL

## 2019-11-06 VITALS
RESPIRATION RATE: 17 BRPM | BODY MASS INDEX: 36.88 KG/M2 | HEIGHT: 67 IN | DIASTOLIC BLOOD PRESSURE: 83 MMHG | TEMPERATURE: 98 F | WEIGHT: 235 LBS | SYSTOLIC BLOOD PRESSURE: 149 MMHG | OXYGEN SATURATION: 96 % | HEART RATE: 94 BPM

## 2019-11-06 DIAGNOSIS — H10.022 OTHER MUCOPURULENT CONJUNCTIVITIS OF LEFT EYE: Primary | ICD-10-CM

## 2019-11-06 PROCEDURE — 99213 PR OFFICE/OUTPT VISIT, EST, LEVL III, 20-29 MIN: ICD-10-PCS | Mod: S$GLB,,, | Performed by: FAMILY MEDICINE

## 2019-11-06 PROCEDURE — 99213 OFFICE O/P EST LOW 20 MIN: CPT | Mod: S$GLB,,, | Performed by: FAMILY MEDICINE

## 2019-11-06 RX ORDER — GENTAMICIN SULFATE 3 MG/ML
1 SOLUTION/ DROPS OPHTHALMIC EVERY 4 HOURS
Qty: 5 ML | Refills: 0 | Status: SHIPPED | OUTPATIENT
Start: 2019-11-06 | End: 2021-04-05

## 2019-11-06 NOTE — LETTER
November 6, 2019      [facility]  [address]  [facility] [place] [address]  Phone: [phone]  Fax: [phone]       Patient: David Nicole   YOB: 1960  Date of Visit: 11/06/2019    To Whom It May Concern:    Eileen Nicole  was at [facility] on 11/06/2019. He may return to work/school on 11/08/2019 with no restrictions. If you have any questions or concerns, or if I can be of further assistance, please do not hesitate to contact me.    Sincerely,          Sulaiman Estrada MD

## 2019-11-06 NOTE — PROGRESS NOTES
"Subjective:       Patient ID: David Nicole Jr. is a 59 y.o. male.    Vitals:  height is 5' 7" (1.702 m) and weight is 106.6 kg (235 lb). His tympanic temperature is 98.1 °F (36.7 °C). His blood pressure is 149/83 (abnormal) and his pulse is 94. His respiration is 17 and oxygen saturation is 96%.     Chief Complaint: Eye Problem    This is a 59 y.o. male who presents today with a chief complaint of   Left eye discharge, and crusty. Sx started yesterday. 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 This is a new problem. The current episode started yesterday. The problem occurs constantly. The problem has been gradually worsening. The injury mechanism is unknown. The pain is at a severity of 4/10. The pain is mild. There is no known exposure to pink eye. He does not wear contacts. Associated symptoms include an eye discharge, eye redness and itching. Pertinent negatives include no blurred vision, double vision, fever, nausea, photophobia or vomiting. He has tried water for the symptoms. The treatment provided no relief.       Constitution: Negative for chills and fever.   HENT: Negative for congestion and sinus pain.    Eyes: Positive for eye discharge, eye itching, eye pain and eye redness. Negative for eye trauma, foreign body in eye, photophobia, vision loss, double vision, blurred vision and eyelid swelling.   Gastrointestinal: Negative for nausea and vomiting.   Skin: Negative for rash.   Allergic/Immunologic: Negative for seasonal allergies and itching.   Neurological: Negative for headaches.       Objective:      Physical Exam   Constitutional: He appears well-developed and well-nourished.   HENT:   Head: Normocephalic and atraumatic.   Eyes: Pupils are equal, round, and reactive to light. EOM are normal. Left eye exhibits discharge. Left conjunctiva is injected.   Cardiovascular: Normal rate, regular rhythm and normal heart sounds. "   Pulmonary/Chest: Effort normal and breath sounds normal.   Nursing note and vitals reviewed.        Assessment:       1. Other mucopurulent conjunctivitis of left eye        Plan:         Other mucopurulent conjunctivitis of left eye  -     gentamicin (GARAMYCIN) 0.3 % ophthalmic solution; Place 1 drop into the left eye every 4 (four) hours.  Dispense: 5 mL; Refill: 0    warm compresses. To see optometry if persists or worsens

## 2019-11-06 NOTE — PATIENT INSTRUCTIONS
What Is Conjunctivitis?    Conjunctivitis is an irritation or infection. It affects the membrane that covers the white of your eye and the inside of your eyelid (conjunctiva). It can happen to one or both eyes. The membrane swells and the blood vessels enlarge (dilate). This makes your eye red. That's why conjunctivitis is sometimes called red eye or pink eye.  What are the symptoms?  If you have one or more of these symptoms, see an eye doctor:  · Redness in and around your eye  · Eyes that are puffy and sore  · Itching, burning, or stinging eyes  · Watery eyes or discharge from your eye  · Eyelids that are crusty or stuck together when you wake up in the morning  · Pink color in the whites of one or both eyes  Getting treatment quickly can help prevent damage to your eyes.  How is it diagnosed?  Conjunctivitis is usually a minor eye infection. But it can sometimes become a more serious problem. Some more serious eye diseases have symptoms that look like conjunctivitis. So it's important for an eye doctor to diagnose you. Your eye doctor will ask about your symptoms and any medicines you take. He or she will ask about any illnesses or medical conditions you may have. The doctor will also check your eyes with a hand-held light and a special microscope called a slit lamp.  Date Last Reviewed: 6/11/2015  © 8492-4777 [employer]. [address] [place] [address], [place] [address]. All rights reserved. This information is not intended as a substitute for professional medical care. Always follow your healthcare professional's instructions.

## 2019-11-21 ENCOUNTER — OFFICE VISIT (OUTPATIENT)
Dept: URGENT CARE | Facility: CLINIC | Age: 59
End: 2019-11-21
Payer: COMMERCIAL

## 2019-11-21 VITALS
BODY MASS INDEX: 36.88 KG/M2 | WEIGHT: 235 LBS | HEART RATE: 108 BPM | OXYGEN SATURATION: 97 % | TEMPERATURE: 99 F | HEIGHT: 67 IN | DIASTOLIC BLOOD PRESSURE: 78 MMHG | SYSTOLIC BLOOD PRESSURE: 150 MMHG

## 2019-11-21 DIAGNOSIS — J20.9 ACUTE BRONCHITIS, UNSPECIFIED ORGANISM: Primary | ICD-10-CM

## 2019-11-21 LAB
CTP QC/QA: YES
FLUAV AG NPH QL: NEGATIVE
FLUBV AG NPH QL: NEGATIVE

## 2019-11-21 PROCEDURE — 71046 X-RAY EXAM CHEST 2 VIEWS: CPT | Mod: S$GLB,,, | Performed by: RADIOLOGY

## 2019-11-21 PROCEDURE — 87804 POCT INFLUENZA A/B: ICD-10-PCS | Mod: 59,QW,S$GLB, | Performed by: FAMILY MEDICINE

## 2019-11-21 PROCEDURE — 96372 THER/PROPH/DIAG INJ SC/IM: CPT | Mod: S$GLB,,, | Performed by: FAMILY MEDICINE

## 2019-11-21 PROCEDURE — 71046 XR CHEST PA AND LATERAL: ICD-10-PCS | Mod: S$GLB,,, | Performed by: RADIOLOGY

## 2019-11-21 PROCEDURE — 99214 OFFICE O/P EST MOD 30 MIN: CPT | Mod: 25,S$GLB,, | Performed by: FAMILY MEDICINE

## 2019-11-21 PROCEDURE — 96372 PR INJECTION,THERAP/PROPH/DIAG2ST, IM OR SUBCUT: ICD-10-PCS | Mod: S$GLB,,, | Performed by: FAMILY MEDICINE

## 2019-11-21 PROCEDURE — 99214 PR OFFICE/OUTPT VISIT, EST, LEVL IV, 30-39 MIN: ICD-10-PCS | Mod: 25,S$GLB,, | Performed by: FAMILY MEDICINE

## 2019-11-21 PROCEDURE — 87804 INFLUENZA ASSAY W/OPTIC: CPT | Mod: QW,S$GLB,, | Performed by: FAMILY MEDICINE

## 2019-11-21 RX ORDER — BENZONATATE 100 MG/1
100 CAPSULE ORAL EVERY 6 HOURS PRN
Qty: 30 CAPSULE | Refills: 1 | Status: SHIPPED | OUTPATIENT
Start: 2019-11-21 | End: 2020-11-20

## 2019-11-21 RX ORDER — DOXYCYCLINE 100 MG/1
100 CAPSULE ORAL 2 TIMES DAILY
Qty: 14 CAPSULE | Refills: 0 | Status: SHIPPED | OUTPATIENT
Start: 2019-11-21 | End: 2019-11-28

## 2019-11-21 RX ORDER — ALBUTEROL SULFATE 90 UG/1
2 AEROSOL, METERED RESPIRATORY (INHALATION) EVERY 6 HOURS PRN
Qty: 18 G | Refills: 0 | Status: SHIPPED | OUTPATIENT
Start: 2019-11-21 | End: 2021-04-05

## 2019-11-21 RX ORDER — BETAMETHASONE SODIUM PHOSPHATE AND BETAMETHASONE ACETATE 3; 3 MG/ML; MG/ML
9 INJECTION, SUSPENSION INTRA-ARTICULAR; INTRALESIONAL; INTRAMUSCULAR; SOFT TISSUE
Status: COMPLETED | OUTPATIENT
Start: 2019-11-21 | End: 2019-11-21

## 2019-11-21 RX ADMIN — BETAMETHASONE SODIUM PHOSPHATE AND BETAMETHASONE ACETATE 9 MG: 3; 3 INJECTION, SUSPENSION INTRA-ARTICULAR; INTRALESIONAL; INTRAMUSCULAR; SOFT TISSUE at 07:11

## 2019-11-22 NOTE — PATIENT INSTRUCTIONS
Bronchitis with Wheezing (Viral or Bacterial: Adult)    Bronchitis is an infection of the air passages. It often occurs during a cold and is usually caused by a virus. Symptoms include cough with mucus (phlegm) and low-grade fever. This illness is contagious during the first few days and is spread through the air by coughing and sneezing, or by direct contact (touching the sick person and then touching your own eyes, nose, or mouth).  If there is a lot of inflammation, air flow is restricted. The air passages may also go into spasm, especially if you have asthma. This causes wheezing and difficulty breathing even in people who do not have asthma.  Bronchitis usually lasts 7 to 14 days. The wheezing should improve with treatment during the first week. An inhaler is often prescribed to relax the air passages and stop wheezing. Antibiotics will be prescribed if your doctor thinks there is also a secondary bacterial infection.  Home care  · If symptoms are severe, rest at home for the first 2 to 3 days. When you go back to your usual activities, don't let yourself get too tired.  · Do not smoke. Also avoid being exposed to secondhand smoke.  · You may use over-the-counter medicine to control fever or pain, unless another medicine was prescribed. Note: If you have chronic liver or kidney disease or have ever had a stomach ulcer or gastrointestinal bleeding, talk with your healthcare provider before using these medicines. Also talk to your provider if you are taking medicine to prevent blood clots.) Aspirin should never be given to anyone younger than 18 years of age who is ill with a viral infection or fever. It may cause severe liver or brain damage.  · Your appetite may be poor, so a light diet is fine. Avoid dehydration by drinking 6 to 8 glasses of fluids per day (such as water, soft drinks, sports drinks, juices, tea, or soup). Extra fluids will help loosen secretions in the nose and lungs.  · Over-the-counter  cough, cold, and sore-throat medicines will not shorten the length of the illness, but they may be helpful to reduce symptoms. (Note: Do not use decongestants if you have high blood pressure.)  · If you were given an inhaler, use it exactly as directed. If you need to use it more often than prescribed, your condition may be worsening. If this happens, contact your healthcare provider.  · If prescribed, finish all antibiotic medicine, even if you are feeling better after only a few days.  Follow-up care  Follow up with your healthcare provider, or as advised. If you had an X-ray or ECG (electrocardiogram), a specialist will review it. You will be notified of any new findings that may affect your care.  Note: If you are age 65 or older, or if you have a chronic lung disease or condition that affects your immune system, or you smoke, talk to your healthcare provider about having a pneumococcal vaccinations and a yearly influenza vaccination (flu shot).  When to seek medical advice  Call your healthcare provider right away if any of these occur:  · Fever of 100.4°F (38°C) or higher  · Coughing up increasing amounts of colored sputum  · Weakness, drowsiness, headache, facial pain, ear pain, or a stiff neck  Call 911, or get immediate medical care  Contact emergency services right away if any of these occur.  · Coughing up blood  · Worsening weakness, drowsiness, headache, or stiff neck  · Increased wheezing not helped with medication, shortness of breath, or pain with breathing  Date Last Reviewed: 9/13/2015  © 0871-1878 [employer]. [address] [place] [address], [place] [address]. All rights reserved. This information is not intended as a substitute for professional medical care. Always follow your healthcare professional's instructions.

## 2019-11-22 NOTE — PROGRESS NOTES
"Subjective:       Patient ID: David Nicole Jr. is a 59 y.o. male.    Vitals:  height is 5' 7" (1.702 m) and weight is 106.6 kg (235 lb). His temperature is 98.6 °F (37 °C). His blood pressure is 150/78 (abnormal) and his pulse is 108. His oxygen saturation is 97%.     Chief Complaint: URI (cough, congestion, runny nose, headache, chills and sweats)    cough, congestion, runny nose, headache, chills and sweats for the past week. Pt. Stayed home for several days and rested but the symptoms just became worse.    URI    This is a new problem. The current episode started in the past 7 days. The problem has been gradually worsening. There has been no fever. Associated symptoms include congestion, coughing, headaches, rhinorrhea and sinus pain. Pertinent negatives include no ear pain, nausea, rash, sore throat, vomiting or wheezing. He has tried acetaminophen, decongestant and antihistamine for the symptoms. The treatment provided mild relief.       Constitution: Positive for activity change, chills, sweating and fatigue. Negative for fever.   HENT: Positive for congestion, postnasal drip, sinus pain and sinus pressure. Negative for ear pain, sore throat and voice change.    Neck: Negative for painful lymph nodes.   Eyes: Negative for eye redness.   Respiratory: Positive for cough and sputum production. Negative for chest tightness, bloody sputum, COPD, shortness of breath, stridor, wheezing and asthma.    Gastrointestinal: Negative for nausea and vomiting.   Musculoskeletal: Positive for pain. Negative for muscle ache.   Skin: Negative for rash.   Allergic/Immunologic: Negative for seasonal allergies and asthma.   Neurological: Positive for headaches.   Hematologic/Lymphatic: Negative for swollen lymph nodes.       Objective:      Physical Exam   Constitutional: He appears well-developed and well-nourished.   HENT:   Head: Normocephalic and atraumatic.   Nose: Mucosal edema and rhinorrhea present. Right sinus exhibits " frontal sinus tenderness. Right sinus exhibits no maxillary sinus tenderness. Left sinus exhibits frontal sinus tenderness. Left sinus exhibits no maxillary sinus tenderness.   Mouth/Throat: Posterior oropharyngeal erythema present. No oropharyngeal exudate.   Eyes: Pupils are equal, round, and reactive to light. EOM are normal.   Neck: Normal range of motion. Neck supple.   Cardiovascular: Normal rate, regular rhythm and normal heart sounds.   Pulmonary/Chest: Effort normal. No respiratory distress. He has wheezes (poor airway movement).   Nursing note and vitals reviewed.    Results for orders placed or performed in visit on 11/21/19   POCT Influenza A/B   Result Value Ref Range    Rapid Influenza A Ag Negative Negative    Rapid Influenza B Ag Negative Negative     Acceptable Yes          Assessment:       1. Acute bronchitis, unspecified organism        Plan:         Acute bronchitis, unspecified organism  -     POCT Influenza A/B  -     X-Ray Chest PA And Lateral  -     betamethasone acetate-betamethasone sodium phosphate injection 9 mg  -     doxycycline (VIBRAMYCIN) 100 MG Cap; Take 1 capsule (100 mg total) by mouth 2 (two) times daily. for 7 days  Dispense: 14 capsule; Refill: 0  -     albuterol (PROAIR HFA) 90 mcg/actuation inhaler; Inhale 2 puffs into the lungs every 6 (six) hours as needed for Wheezing. Rescue  Dispense: 18 g; Refill: 0  -     benzonatate (TESSALON PERLES) 100 MG capsule; Take 1 capsule (100 mg total) by mouth every 6 (six) hours as needed for Cough.  Dispense: 30 capsule; Refill: 1

## 2019-12-31 ENCOUNTER — OFFICE VISIT (OUTPATIENT)
Dept: INTERNAL MEDICINE | Facility: CLINIC | Age: 59
End: 2019-12-31
Payer: COMMERCIAL

## 2019-12-31 VITALS
WEIGHT: 239.75 LBS | BODY MASS INDEX: 37.63 KG/M2 | OXYGEN SATURATION: 95 % | TEMPERATURE: 99 F | HEIGHT: 67 IN | SYSTOLIC BLOOD PRESSURE: 140 MMHG | DIASTOLIC BLOOD PRESSURE: 82 MMHG | HEART RATE: 106 BPM

## 2019-12-31 DIAGNOSIS — R05.9 COUGH: Primary | ICD-10-CM

## 2019-12-31 PROCEDURE — 99999 PR PBB SHADOW E&M-EST. PATIENT-LVL III: ICD-10-PCS | Mod: PBBFAC,,, | Performed by: INTERNAL MEDICINE

## 2019-12-31 PROCEDURE — 99999 PR PBB SHADOW E&M-EST. PATIENT-LVL III: CPT | Mod: PBBFAC,,, | Performed by: INTERNAL MEDICINE

## 2019-12-31 PROCEDURE — 99214 OFFICE O/P EST MOD 30 MIN: CPT | Mod: S$GLB,,, | Performed by: INTERNAL MEDICINE

## 2019-12-31 PROCEDURE — 99214 PR OFFICE/OUTPT VISIT, EST, LEVL IV, 30-39 MIN: ICD-10-PCS | Mod: S$GLB,,, | Performed by: INTERNAL MEDICINE

## 2019-12-31 RX ORDER — CODEINE PHOSPHATE AND GUAIFENESIN 10; 100 MG/5ML; MG/5ML
5 SOLUTION ORAL 3 TIMES DAILY PRN
Qty: 118 ML | Refills: 0 | Status: SHIPPED | OUTPATIENT
Start: 2019-12-31 | End: 2020-01-10

## 2019-12-31 RX ORDER — CODEINE PHOSPHATE AND GUAIFENESIN 10; 100 MG/5ML; MG/5ML
5 SOLUTION ORAL 3 TIMES DAILY PRN
Qty: 118 ML | Refills: 0 | Status: SHIPPED | OUTPATIENT
Start: 2019-12-31 | End: 2019-12-31 | Stop reason: SDUPTHER

## 2019-12-31 NOTE — PROGRESS NOTES
"    CHIEF COMPLAINT     Chief Complaint   Patient presents with    Cough     2+ months       HPI     David Nicole Jr. is a 59 y.o. male here today for     Cough x 2 months.   Reports productive cough. Has been seen in UC x2 with doxycycline, tessalon perles and steroid shot on 11/21. Reports getting some relief for 1-2 days. Never had symptoms that have lasted this long.  Reports some congestion; Takes allegra and flonase daily. Reports he is pretty religous about taking these.  His symptoms are particularly bothersome at night and making it hard for him to sleep.  History multiple over-the-counter cough medicines without relief.  Reflux.  Well controlled with current medications  Personally Reviewed Patient's Medical, surgical, family and social hx. Changes updated in [facility].  Care Team updated in Epic    Review of Systems:  Review of Systems   HENT: Positive for postnasal drip.    Respiratory: Positive for cough. Negative for shortness of breath and wheezing.        Health Maintenance:   Reviewed with patient  Due for the following:      PHYSICAL EXAM     BP (!) 140/82 (BP Location: Left arm, Patient Position: Sitting, BP Method: Large (Manual))   Pulse 106   Temp 98.7 °F (37.1 °C) (Oral)   Ht 5' 7" (1.702 m)   Wt 108.7 kg (239 lb 12 oz)   SpO2 95%   BMI 37.55 kg/m²     Gen: Well Appearing, NAD  HEENT: PERR, EOMI, bilateral enlarged nasal turbinates with clear rhinorrhea mucus River posterior pharynx  Neck: FROM, no thyromegaly, no cervical adenopathy  CVD: RRR, no M/R/G  Pulm: Normal work of breathing, CTAB, no wheezing  Abd:  Soft, NT, ND non TTP, no mass  MSK: no LE edema  Neuro: A&Ox3, gait normal, speech normal  Mood; Mood normal, behavior normal, thought process linear       LABS     Labs reviewed;   Lab Results   Component Value Date    CREATININE 0.8 10/26/2019    BUN 16 10/26/2019     10/26/2019    K 5.1 10/26/2019     10/26/2019    CO2 27 10/26/2019     Chest x-ray personally " reviewed normal    ASSESSMENT     1. Cough  guaifenesin-codeine 100-10 mg/5 ml (TUSSI-ORGANIDIN NR)  mg/5 mL syrup    DISCONTINUED: guaifenesin-codeine 100-10 mg/5 ml (TUSSI-ORGANIDIN NR)  mg/5 mL syrup           Plan     David Nicole Jr. is a 59 y.o. male with  1. Cough  - guaifenesin-codeine 100-10 mg/5 ml (TUSSI-ORGANIDIN NR)  mg/5 mL syrup; Take 5 mLs by mouth 3 (three) times daily as needed for Cough.  Dispense: 118 mL; Refill: 0   Appears to be postnasal drip driven.  Do not think his infectious as patient has had symptoms for 2 months and been treated with antibiotic.  Was initially concern for possible GERD related however patient reports symptoms well controlled on PPI.  Recommend increasing Flonase to 2 sprays each nares twice a day.  Demonstrated proper technique.  Continue oral antihistamine.  Will also give stronger cough medication to help alleviate symptoms while we address her postnasal drip    Chandan Jacobo MD

## 2020-01-06 PROBLEM — Z13.9 ENCOUNTER FOR HEALTH-RELATED SCREENING: Status: RESOLVED | Noted: 2017-01-17 | Resolved: 2020-01-06

## 2020-01-15 ENCOUNTER — PATIENT OUTREACH (OUTPATIENT)
Dept: ADMINISTRATIVE | Facility: HOSPITAL | Age: 60
End: 2020-01-15

## 2020-01-28 PROBLEM — E61.1 IRON DEFICIENCY: Status: ACTIVE | Noted: 2020-01-28

## 2020-01-29 ENCOUNTER — LAB VISIT (OUTPATIENT)
Dept: LAB | Facility: HOSPITAL | Age: 60
End: 2020-01-29
Attending: INTERNAL MEDICINE
Payer: COMMERCIAL

## 2020-01-29 ENCOUNTER — OFFICE VISIT (OUTPATIENT)
Dept: INTERNAL MEDICINE | Facility: CLINIC | Age: 60
End: 2020-01-29
Payer: COMMERCIAL

## 2020-01-29 VITALS
OXYGEN SATURATION: 94 % | HEART RATE: 95 BPM | DIASTOLIC BLOOD PRESSURE: 84 MMHG | WEIGHT: 246.5 LBS | BODY MASS INDEX: 38.69 KG/M2 | SYSTOLIC BLOOD PRESSURE: 142 MMHG | TEMPERATURE: 98 F | HEIGHT: 67 IN

## 2020-01-29 DIAGNOSIS — R73.02 GLUCOSE INTOLERANCE (IMPAIRED GLUCOSE TOLERANCE): ICD-10-CM

## 2020-01-29 DIAGNOSIS — E66.01 MORBID OBESITY: ICD-10-CM

## 2020-01-29 DIAGNOSIS — Z12.5 SCREENING FOR PROSTATE CANCER: ICD-10-CM

## 2020-01-29 DIAGNOSIS — K21.9 GASTROESOPHAGEAL REFLUX DISEASE WITHOUT ESOPHAGITIS: ICD-10-CM

## 2020-01-29 DIAGNOSIS — E61.1 IRON DEFICIENCY: Primary | ICD-10-CM

## 2020-01-29 DIAGNOSIS — K44.9 HIATAL HERNIA: ICD-10-CM

## 2020-01-29 DIAGNOSIS — F33.41 RECURRENT MAJOR DEPRESSIVE DISORDER, IN PARTIAL REMISSION: ICD-10-CM

## 2020-01-29 DIAGNOSIS — E78.2 HYPERLIPIDEMIA, MIXED: ICD-10-CM

## 2020-01-29 DIAGNOSIS — G43.919 INTRACTABLE MIGRAINE WITHOUT STATUS MIGRAINOSUS, UNSPECIFIED MIGRAINE TYPE: ICD-10-CM

## 2020-01-29 DIAGNOSIS — I10 ESSENTIAL HYPERTENSION: ICD-10-CM

## 2020-01-29 DIAGNOSIS — Z86.39 H/O TYPE 2 DIABETES MELLITUS: ICD-10-CM

## 2020-01-29 DIAGNOSIS — E61.1 IRON DEFICIENCY: ICD-10-CM

## 2020-01-29 LAB
BASOPHILS # BLD AUTO: 0.04 K/UL (ref 0–0.2)
BASOPHILS NFR BLD: 0.5 % (ref 0–1.9)
DIFFERENTIAL METHOD: ABNORMAL
EOSINOPHIL # BLD AUTO: 0 K/UL (ref 0–0.5)
EOSINOPHIL NFR BLD: 0.3 % (ref 0–8)
ERYTHROCYTE [DISTWIDTH] IN BLOOD BY AUTOMATED COUNT: 18 % (ref 11.5–14.5)
HCT VFR BLD AUTO: 38 % (ref 40–54)
HGB BLD-MCNC: 11.6 G/DL (ref 14–18)
LYMPHOCYTES # BLD AUTO: 2 K/UL (ref 1–4.8)
LYMPHOCYTES NFR BLD: 23.1 % (ref 18–48)
MCH RBC QN AUTO: 22.4 PG (ref 27–31)
MCHC RBC AUTO-ENTMCNC: 30.5 G/DL (ref 32–36)
MCV RBC AUTO: 73 FL (ref 82–98)
MONOCYTES # BLD AUTO: 1.2 K/UL (ref 0.3–1)
MONOCYTES NFR BLD: 13.2 % (ref 4–15)
NEUTROPHILS # BLD AUTO: 5.4 K/UL (ref 1.8–7.7)
NEUTROPHILS NFR BLD: 62.9 % (ref 38–73)
PLATELET # BLD AUTO: 292 K/UL (ref 150–350)
PMV BLD AUTO: 10.1 FL (ref 9.2–12.9)
RBC # BLD AUTO: 5.19 M/UL (ref 4.6–6.2)
WBC # BLD AUTO: 8.69 K/UL (ref 3.9–12.7)

## 2020-01-29 PROCEDURE — 99214 PR OFFICE/OUTPT VISIT, EST, LEVL IV, 30-39 MIN: ICD-10-PCS | Mod: S$GLB,,, | Performed by: INTERNAL MEDICINE

## 2020-01-29 PROCEDURE — 36415 COLL VENOUS BLD VENIPUNCTURE: CPT

## 2020-01-29 PROCEDURE — 99999 PR PBB SHADOW E&M-EST. PATIENT-LVL IV: ICD-10-PCS | Mod: PBBFAC,,, | Performed by: INTERNAL MEDICINE

## 2020-01-29 PROCEDURE — 99999 PR PBB SHADOW E&M-EST. PATIENT-LVL IV: CPT | Mod: PBBFAC,,, | Performed by: INTERNAL MEDICINE

## 2020-01-29 PROCEDURE — 99214 OFFICE O/P EST MOD 30 MIN: CPT | Mod: S$GLB,,, | Performed by: INTERNAL MEDICINE

## 2020-01-29 PROCEDURE — 85025 COMPLETE CBC W/AUTO DIFF WBC: CPT

## 2020-01-29 RX ORDER — OMEPRAZOLE 20 MG/1
20 CAPSULE, DELAYED RELEASE ORAL EVERY MORNING
Qty: 90 CAPSULE | Refills: 3 | Status: SHIPPED | OUTPATIENT
Start: 2020-01-29 | End: 2021-02-23

## 2020-01-29 RX ORDER — DIVALPROEX SODIUM 500 MG/1
TABLET, FILM COATED, EXTENDED RELEASE ORAL
Qty: 90 TABLET | Refills: 1 | Status: SHIPPED | OUTPATIENT
Start: 2020-01-29 | End: 2020-05-06 | Stop reason: SDUPTHER

## 2020-01-29 RX ORDER — ATORVASTATIN CALCIUM 80 MG/1
80 TABLET, FILM COATED ORAL NIGHTLY
Qty: 90 TABLET | Refills: 3 | Status: SHIPPED | OUTPATIENT
Start: 2020-01-29 | End: 2021-01-03

## 2020-01-29 RX ORDER — FLUOXETINE HYDROCHLORIDE 40 MG/1
40 CAPSULE ORAL EVERY MORNING
Qty: 90 CAPSULE | Refills: 3 | Status: SHIPPED | OUTPATIENT
Start: 2020-01-29 | End: 2021-04-05 | Stop reason: SDUPTHER

## 2020-01-29 RX ORDER — VERAPAMIL HYDROCHLORIDE 240 MG/1
CAPSULE, EXTENDED RELEASE ORAL
Qty: 90 CAPSULE | Refills: 3 | Status: SHIPPED | OUTPATIENT
Start: 2020-01-29 | End: 2021-02-24

## 2020-01-29 RX ORDER — METFORMIN HYDROCHLORIDE 500 MG/1
TABLET, EXTENDED RELEASE ORAL
Qty: 180 TABLET | Refills: 1 | Status: SHIPPED | OUTPATIENT
Start: 2020-01-29 | End: 2020-05-06 | Stop reason: SDUPTHER

## 2020-01-30 ENCOUNTER — TELEPHONE (OUTPATIENT)
Dept: BEHAVIORAL HEALTH | Facility: CLINIC | Age: 60
End: 2020-01-30

## 2020-01-31 NOTE — PROGRESS NOTES
Subjective:       Patient ID: David Nicole Jr. is a 59 y.o. male.    Chief Complaint: Follow-up (6-month f/u)   On average his blood pressure is running too high  Goal blood pressure 130/80 or less  Anemia remains a concern.  He has not donated blood in 9 months.  In 2017 he had an EGD and colonoscopy with polyps removed, hiatal hernia was found biopsy showed no evidence of H pylori  He is encouraged to pursue counseling for depression  He is encouraged to work with our health  because he has been gaining weight  HPI  Review of Systems   Constitutional: Negative for activity change, chills and fever.   HENT: Negative for congestion, dental problem, hearing loss, tinnitus and trouble swallowing.    Eyes: Negative for visual disturbance.   Respiratory: Negative for cough, shortness of breath and wheezing.    Cardiovascular: Negative for chest pain, palpitations and leg swelling.   Genitourinary: Negative for dysuria, frequency and urgency.   Musculoskeletal: Negative for back pain and neck pain.   Neurological: Negative for dizziness, weakness and headaches.   Psychiatric/Behavioral: Negative for dysphoric mood and sleep disturbance. The patient is not nervous/anxious.        Objective:      Physical Exam   Constitutional: He is oriented to person, place, and time. No distress.   HENT:   Head: Atraumatic.   Eyes: Conjunctivae are normal. No scleral icterus.   Neck: Neck supple.   Cardiovascular: Normal rate and regular rhythm.   Pulmonary/Chest: Effort normal and breath sounds normal.   Abdominal: Soft. There is no tenderness.   Musculoskeletal: He exhibits no edema.   Lymphadenopathy:     He has no cervical adenopathy.   Neurological: He is alert and oriented to person, place, and time.   Skin: Skin is warm and dry.   Psychiatric: He has a normal mood and affect. His behavior is normal.   Nursing note and vitals reviewed.      Assessment:       1. Iron deficiency    2. Glucose intolerance (impaired glucose  tolerance)    3. Essential hypertension    4. H/O type 2 diabetes mellitus    5. Morbid obesity    6. Intractable migraine without status migrainosus, unspecified migraine type    7. Screening for prostate cancer    8. Hiatal hernia    9. Gastroesophageal reflux disease without esophagitis    10. Hyperlipidemia, mixed    11. Recurrent major depressive disorder, in partial remission        Plan:   David was seen today for follow-up.    Diagnoses and all orders for this visit:    Iron deficiency  -     CBC auto differential; Future  -     Ferritin; Future  -     CBC auto differential; Future    Glucose intolerance (impaired glucose tolerance)  -     Comprehensive metabolic panel; Future  -     Hemoglobin A1c; Future  -     TSH; Future    Essential hypertension  -     Comprehensive metabolic panel; Future  -     TSH; Future    H/O type 2 diabetes mellitus  -     Comprehensive metabolic panel; Future  -     Hemoglobin A1c; Future  -     TSH; Future    Morbid obesity  -     TSH; Future    Intractable migraine without status migrainosus, unspecified migraine type    Screening for prostate cancer  -     PSA, Screening; Future    Hiatal hernia    Gastroesophageal reflux disease without esophagitis    Hyperlipidemia, mixed  -     Lipid panel; Future    Recurrent major depressive disorder, in partial remission    Other orders  -     divalproex ER (DEPAKOTE) 500 MG Tb24; TAKE one TABLET ONCE DAILY TO PREVENT MIGRAINE  -     FLUoxetine 40 MG capsule; Take 1 capsule (40 mg total) by mouth every morning.  -     atorvastatin (LIPITOR) 80 MG tablet; Take 1 tablet (80 mg total) by mouth nightly.  -     verapamil (VERELAN) 240 MG C24P; TAKE 1 CAPSULE DAILY AS NEEDED (BLOOD PRESSURE CONTROL AND MIGRAINE PREVENTION)  -     omeprazole (PRILOSEC) 20 MG capsule; Take 1 capsule (20 mg total) by mouth every morning. To control GERD  -     metFORMIN (GLUCOPHAGE-XR) 500 MG 24 hr tablet; TAKE 1 TABLET BY MOUTH TWO  TIMES DAILY WITH  MEALS      Medication List with Changes/Refills   Current Medications    ALBUTEROL (PROAIR HFA) 90 MCG/ACTUATION INHALER    Inhale 2 puffs into the lungs every 6 (six) hours as needed for Wheezing. Rescue    ASCORBIC ACID, VITAMIN C, (VITAMIN C) 100 MG TABLET    Take 100 mg by mouth once daily.    BENZONATATE (TESSALON PERLES) 100 MG CAPSULE    Take 1 capsule (100 mg total) by mouth every 6 (six) hours as needed for Cough.    CHLORPHENIRAMINE-ACETAMINOPHEN (CORICIDIN HBP COLD AND FLU) 2-325 MG TAB    Take by mouth.    CYANOCOBALAMIN, VITAMIN B-12, (VITAMIN B-12 ORAL)    Take by mouth.    DEXTROMETHORPHAN-GUAIFENESIN  MG (MUCINEX DM)  MG PER 12 HR TABLET    Take 1 tablet by mouth every 12 (twelve) hours.    FLUTICASONE (FLONASE) 50 MCG/ACTUATION NASAL SPRAY    USE 2 SPRAYS IN EACH NOSTRIL ONCE DAILY    GENTAMICIN (GARAMYCIN) 0.3 % OPHTHALMIC SOLUTION    Place 1 drop into the left eye every 4 (four) hours.    MULTIVITAMIN (ONE DAILY MULTIVITAMIN) PER TABLET    Take 1 tablet by mouth once daily.   Changed and/or Refilled Medications    Modified Medication Previous Medication    ATORVASTATIN (LIPITOR) 80 MG TABLET atorvastatin (LIPITOR) 80 MG tablet       Take 1 tablet (80 mg total) by mouth nightly.    Take 1 tablet (80 mg total) by mouth nightly.    DIVALPROEX ER (DEPAKOTE) 500 MG TB24 divalproex ER (DEPAKOTE) 500 MG Tb24       TAKE one TABLET ONCE DAILY TO PREVENT MIGRAINE    TAKE 2 TABLETS ONCE DAILY TO PREVENT MIGRAINE    FLUOXETINE 40 MG CAPSULE FLUoxetine 40 MG capsule       Take 1 capsule (40 mg total) by mouth every morning.    Take 1 capsule (40 mg total) by mouth every morning.    METFORMIN (GLUCOPHAGE-XR) 500 MG 24 HR TABLET metFORMIN (GLUCOPHAGE-XR) 500 MG 24 hr tablet       TAKE 1 TABLET BY MOUTH TWO  TIMES DAILY WITH MEALS    TAKE 1 TABLET BY MOUTH TWO  TIMES DAILY WITH MEALS    OMEPRAZOLE (PRILOSEC) 20 MG CAPSULE omeprazole (PRILOSEC) 20 MG capsule       Take 1 capsule (20 mg total) by mouth  every morning. To control GERD    Take 1 capsule (20 mg total) by mouth every morning. To control GERD    VERAPAMIL (VERELAN) 240 MG C24P verapamil (VERELAN) 240 MG C24P       TAKE 1 CAPSULE DAILY AS NEEDED (BLOOD PRESSURE CONTROL AND MIGRAINE PREVENTION)    TAKE 1 CAPSULE DAILY AS NEEDED (BLOOD PRESSURE CONTROL AND MIGRAINE PREVENTION)       Follow up in about 3 months (around 5/5/2020) for annual physical after labs.

## 2020-02-10 ENCOUNTER — TELEPHONE (OUTPATIENT)
Dept: INTERNAL MEDICINE | Facility: CLINIC | Age: 60
End: 2020-02-10

## 2020-02-10 NOTE — TELEPHONE ENCOUNTER
Received call back from patient to schedule appt.   Patient referred by Dr. Hughes for Health coaching (obesity, glucose intolerance, lifestyle changes).  Spoke with patient and gave an explanation about Health Coaching program and invited participation.   Patient states he would like to participate.  Set appointment for 2.24.20.    Gave direct contact number to call if he has any questions [phone].   Caryn Pat RN  Health

## 2020-02-24 ENCOUNTER — CLINICAL SUPPORT (OUTPATIENT)
Dept: INTERNAL MEDICINE | Facility: CLINIC | Age: 60
End: 2020-02-24
Payer: COMMERCIAL

## 2020-02-24 VITALS — WEIGHT: 245.81 LBS | BODY MASS INDEX: 38.5 KG/M2

## 2020-02-24 NOTE — PROGRESS NOTES
Date: 2/24/20                     Time: 0700  Initial Health  Contact - [x]Clinic visit  []  Phone Visit  ASSEMENT: Information obtained through combination of chart review prior to seeing patient, patient self-report.   Primary Referral diagnosis/reason for referral:     obesity, glucose intolerance, lifestyle changes      (See physician progress note for complete list of diagnoses.)  PCP:  Dr. Hughes      Referent :  [x] PCP       [] Transition Navigator    []  E.DJeffery    []  APRN  []  Other:     Supports:    wife      Preferred Learning Style  (may be a combination)  [x]  Visual (pictures/ video)     [] Auditory/ Listening   [x]  Reading    []  Hands-on/ Demonstration   [x]  1:1    []  Group        [x]  Alone       []  No preference   [x]  Combination  []  Other:         Presenting issues from patients point of view:  [x]  Improve nutrition/increase healthy choices.                    [x]  Improve /maintain current functioning.  [x]  Obtain and maintain healthy blood pressure.                  []  Stop smoking.  [x]  Improve weight management.                                       [x]  Lower cholesterol.  [x]  Improve blood glucose management.                            [x]  Improve breathing.  [x]  Increase energy level.                                                      [x]  Increase/maintain independence.   [x]  Improve sleep.                                                                [x]  Decrease stress.  [x]  Improve pain management.                                             [x]  Improve mood.  []  Other:                  Pt. Education Level:     College BA     Disease specific education services attended:  no       Patient Employed:  [x]yes    [] No  Where [facility] [facility]__  Days and Hours:     M_F 9-6           Current Self-help Behaviors  []  Checks glucose level        times a day.  []  Tries to modify meals so more healthy.  []  Exercises by        []   Takes BP        times a       (insert frequency)  []  Weighs self         (how often)  [x]  Takes all medications as prescribed.  [x]  Rarely misses health care appointments.  []  Sleeps 8 hours without waking more than twice.  [x]  Consistently wears/uses functioning aids as recommended  (ie:  Contacts [] , glasses [x] , hearing  Aid [] , prosthesis [] ,  Walker [] ,  Wheelchair []  etc.)  []  Regularly engages in stress management activities I.e.:  []  Quit smoking   []  Purposefully educates self about health issues.  []  Pt did bring glucose log with him/her.  []  Other  (explain)           []  Comments:       []  Reported Blood Sugar Readings:          Health screenings   Last PCP visit:  1.29.20                                                      PSA:    4.24.19       Colon: 2.13.17   Lipids: 4.24.19   CMP: 10.26.19   HgA1C: 10.26.19 (5.8) was 6.0 -4.24.19 Takes Metformin   Vitamin D: 10.26.19- 34            Eye Exam:   2019 Dr. Lui Osborne         Strengths:  []  Confident                       []  Determined/persistent           []  Enthusiastic         []  Good problem solving           [x]  Good self-control                   [x]  Hard working        [x]  Hopeful/optimistic                  [x]  Intelligent                                []  Self aware  []  Creative                                 []  Flexible          [x]  Sense of humor                     []  Open/willing          [x]  Spiritual                                  [x] Values health    []  Successful overcoming difficult challenges in the past.     []  Pos support network  [x]   Health literate (The degree to which individuals have the capacity to obtain, process, and    understand basic health information and services needed to make appropriate health decisions.- Dept. of Health and Human services.)  []   Other Comments:             Change Markers  Scale 0-10 with 10 representing most Ready 0 least ready, 10 most important 0  least important 10 most confident 0 least confident.     [] NA at this time.   Readiness:   8  ;  Importance:   10  ;  Confidence:   8     INTERVENTIONS:  [x] Given an explanation about health coaching program and invited participation.  [x] Listened reflectively; provided support, encouragement, and validation; respected  and maintained patient self-direction; explored pros/cons of change; personalized health risks of maintaining the status quo; and facilitated recognition of discrepancy between current behaviors and patients goals and values.  [x] Assessed stage of change and employed appropriate motivational interviewing strategies to facilitate movement toward the next stage of change and healthy behavior modification.  [x] Normalized occurrence of relapse, helped patient recognize outcome of new self-learning as a result of the relapse such as triggers, and helped focus on factors to reduce chances of returning to previous behaviors .  [x] Used readiness scale ratings to guide assessment of importance and confidence of successfully reaching goals.  [x] Assisted patient to develop goal, action plan, and address potential barriers to goal     achievement.  [] Patient was given a new (insert glucose meter or other supplies), taught to use, and      successfully demonstrated ability to carry out essential steps of process.    [] Rx for ( monitor/supplies, pen needles, etc.) was obtained.  [x] Provided educational review, written materials/handouts (Meal Planning Counting Carbs, Healthy Plate, 10 Rules for Eating Safely for Life, 10 Tips to Cutting Your Cravings, Probiotics).   [x] Topics discussed/provided:    GI of foods and how it affects your metabolism and helps you burn fat, carb counting    [x] Facilitated completion of needed exams and screenings by reviewing Diabetic/Health Maintenance Report Card with patient.  [x] Provided pt with my business card.  [x] Informed of/reviewed how to access health  and  after hours assistance.  [x] Discussed, planned, and scheduled future contacts.  [x]Challenges/Barriers identified discussed as identified by patient.  [x] Needs identified by this Health :    Education and support     [] Other:            For additional care management support patient was referred to:        []  Community resources for                        []  Medication assistance programs               []  Dietician              []  Diabetes  Boot Camp                                        []  Mental health services                           []                  []  Diabetes [facility]                                              []  Home health services                                []  Complex case management              []  PCP/covering provider due to                 []  Emergency Dept.                                  []  GYN              []  Optometrist/ Ophthalmologist                          []  Podiatrist                                                      [x]  N/A        []  Other:           RESPONSE/IMPRESSION  Behavior is consistent with the following stage of change:  []  Pre-contemplation  []  Contemplation  [x]  Preparation  []  Action  []  Maintenance  []  Relapse    Level of participation:  []  Refused to participate  []  Guarded / resistant  []  Passive participant  [x]  Active participant/interactive  [x]  Interested/receptive  [x]  Self-motivated  []  Other          Goal developed by patient today:  9/7  Eat better < than 25 gms /day sugar   Exercise 3 x week   Less than 100 gms carbs /day    Plan (needed actions to accomplish goal):          [x] Pt will work on action plan as stated above.        [x] Pt will follow up with Health  on  3.23.20.      [x] Health  will remain available.  [x] Health  will maintain regular contacts with patient to educate, support, encourage; help problem-solve; assist with development of self-advocacy/increasing independent  health management; and provide resources as needed.  [] Pt has declined Health  Program at this time. Provided pt with Health  Program information should they decide to participate at a later time.        [] Pt to facilitate contact with Health        [] Health  to facilitate contact with patient.        [] Other:          Notes:   Met with patient he is interested in losing weight, decreasing glucose.  His lowest weight was 180 lb and highest is now 245 lbs.   He has 5 children (30 -S, 28- S, 26-D and 25 y/o twins girls 2 daughters nurses at [employer]).   His best reasons are to be healthier, have more energy and get off of some medicines.   Goals set by patient and HC will continue to work with patient to assist to reach his goals.    Best Method of Contact:  [] email:   [x] Phone:   [x] Mail  [] Office     Caryn Pat RN HC

## 2020-03-17 ENCOUNTER — TELEPHONE (OUTPATIENT)
Dept: INTERNAL MEDICINE | Facility: CLINIC | Age: 60
End: 2020-03-17

## 2020-03-17 NOTE — TELEPHONE ENCOUNTER
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 He states he has quit the sodas since our last meeting and has been cutting back on sugars and eating fast food. He has had pizza a few times. States he really has not lost any weight. Encouraged to keep up the good work. States he will call back to r/s appt when things settle down at work.  Caryn Pat RN HC

## 2020-05-02 ENCOUNTER — LAB VISIT (OUTPATIENT)
Dept: LAB | Facility: HOSPITAL | Age: 60
End: 2020-05-02
Attending: INTERNAL MEDICINE
Payer: COMMERCIAL

## 2020-05-02 DIAGNOSIS — I10 ESSENTIAL HYPERTENSION: ICD-10-CM

## 2020-05-02 DIAGNOSIS — E78.2 HYPERLIPIDEMIA, MIXED: ICD-10-CM

## 2020-05-02 DIAGNOSIS — E61.1 IRON DEFICIENCY: ICD-10-CM

## 2020-05-02 DIAGNOSIS — Z12.5 SCREENING FOR PROSTATE CANCER: ICD-10-CM

## 2020-05-02 DIAGNOSIS — E66.01 MORBID OBESITY: ICD-10-CM

## 2020-05-02 DIAGNOSIS — Z86.39 H/O TYPE 2 DIABETES MELLITUS: ICD-10-CM

## 2020-05-02 DIAGNOSIS — R73.02 GLUCOSE INTOLERANCE (IMPAIRED GLUCOSE TOLERANCE): ICD-10-CM

## 2020-05-02 LAB
ALBUMIN SERPL BCP-MCNC: 3.6 G/DL (ref 3.5–5.2)
ALP SERPL-CCNC: 88 U/L (ref 55–135)
ALT SERPL W/O P-5'-P-CCNC: 14 U/L (ref 10–44)
ANION GAP SERPL CALC-SCNC: 8 MMOL/L (ref 8–16)
AST SERPL-CCNC: 17 U/L (ref 10–40)
BASOPHILS # BLD AUTO: 0.07 K/UL (ref 0–0.2)
BASOPHILS NFR BLD: 0.8 % (ref 0–1.9)
BILIRUB SERPL-MCNC: 0.4 MG/DL (ref 0.1–1)
BUN SERPL-MCNC: 16 MG/DL (ref 6–20)
CALCIUM SERPL-MCNC: 8.9 MG/DL (ref 8.7–10.5)
CHLORIDE SERPL-SCNC: 101 MMOL/L (ref 95–110)
CHOLEST SERPL-MCNC: 133 MG/DL (ref 120–199)
CHOLEST/HDLC SERPL: 4.2 {RATIO} (ref 2–5)
CO2 SERPL-SCNC: 28 MMOL/L (ref 23–29)
COMPLEXED PSA SERPL-MCNC: 1.1 NG/ML (ref 0–4)
CREAT SERPL-MCNC: 0.8 MG/DL (ref 0.5–1.4)
DIFFERENTIAL METHOD: ABNORMAL
EOSINOPHIL # BLD AUTO: 0 K/UL (ref 0–0.5)
EOSINOPHIL NFR BLD: 0 % (ref 0–8)
ERYTHROCYTE [DISTWIDTH] IN BLOOD BY AUTOMATED COUNT: 18.6 % (ref 11.5–14.5)
EST. GFR  (AFRICAN AMERICAN): >60 ML/MIN/1.73 M^2
EST. GFR  (NON AFRICAN AMERICAN): >60 ML/MIN/1.73 M^2
ESTIMATED AVG GLUCOSE: 123 MG/DL (ref 68–131)
FERRITIN SERPL-MCNC: 2 NG/ML (ref 20–300)
GLUCOSE SERPL-MCNC: 106 MG/DL (ref 70–110)
HBA1C MFR BLD HPLC: 5.9 % (ref 4–5.6)
HCT VFR BLD AUTO: 41.9 % (ref 40–54)
HDLC SERPL-MCNC: 32 MG/DL (ref 40–75)
HDLC SERPL: 24.1 % (ref 20–50)
HGB BLD-MCNC: 12 G/DL (ref 14–18)
IMM GRANULOCYTES # BLD AUTO: 0.07 K/UL (ref 0–0.04)
IMM GRANULOCYTES NFR BLD AUTO: 0.8 % (ref 0–0.5)
LDLC SERPL CALC-MCNC: 85 MG/DL (ref 63–159)
LYMPHOCYTES # BLD AUTO: 2 K/UL (ref 1–4.8)
LYMPHOCYTES NFR BLD: 21.7 % (ref 18–48)
MCH RBC QN AUTO: 22.3 PG (ref 27–31)
MCHC RBC AUTO-ENTMCNC: 28.6 G/DL (ref 32–36)
MCV RBC AUTO: 78 FL (ref 82–98)
MONOCYTES # BLD AUTO: 0.8 K/UL (ref 0.3–1)
MONOCYTES NFR BLD: 8.9 % (ref 4–15)
NEUTROPHILS # BLD AUTO: 6.3 K/UL (ref 1.8–7.7)
NEUTROPHILS NFR BLD: 67.8 % (ref 38–73)
NONHDLC SERPL-MCNC: 101 MG/DL
NRBC BLD-RTO: 0 /100 WBC
PLATELET # BLD AUTO: 286 K/UL (ref 150–350)
PMV BLD AUTO: 10.5 FL (ref 9.2–12.9)
POTASSIUM SERPL-SCNC: 4.7 MMOL/L (ref 3.5–5.1)
PROT SERPL-MCNC: 7 G/DL (ref 6–8.4)
RBC # BLD AUTO: 5.39 M/UL (ref 4.6–6.2)
SODIUM SERPL-SCNC: 137 MMOL/L (ref 136–145)
TRIGL SERPL-MCNC: 80 MG/DL (ref 30–150)
TSH SERPL DL<=0.005 MIU/L-ACNC: 1.17 UIU/ML (ref 0.4–4)
WBC # BLD AUTO: 9.29 K/UL (ref 3.9–12.7)

## 2020-05-02 PROCEDURE — 80053 COMPREHEN METABOLIC PANEL: CPT

## 2020-05-02 PROCEDURE — 85025 COMPLETE CBC W/AUTO DIFF WBC: CPT

## 2020-05-02 PROCEDURE — 36415 COLL VENOUS BLD VENIPUNCTURE: CPT

## 2020-05-02 PROCEDURE — 80061 LIPID PANEL: CPT

## 2020-05-02 PROCEDURE — 84443 ASSAY THYROID STIM HORMONE: CPT

## 2020-05-02 PROCEDURE — 83036 HEMOGLOBIN GLYCOSYLATED A1C: CPT

## 2020-05-02 PROCEDURE — 82728 ASSAY OF FERRITIN: CPT

## 2020-05-02 PROCEDURE — 84153 ASSAY OF PSA TOTAL: CPT

## 2020-05-06 ENCOUNTER — OFFICE VISIT (OUTPATIENT)
Dept: INTERNAL MEDICINE | Facility: CLINIC | Age: 60
End: 2020-05-06
Payer: COMMERCIAL

## 2020-05-06 DIAGNOSIS — R73.02 GLUCOSE INTOLERANCE (IMPAIRED GLUCOSE TOLERANCE): ICD-10-CM

## 2020-05-06 DIAGNOSIS — Z86.39 H/O TYPE 2 DIABETES MELLITUS: ICD-10-CM

## 2020-05-06 DIAGNOSIS — R63.5 WEIGHT GAIN: ICD-10-CM

## 2020-05-06 DIAGNOSIS — I10 ESSENTIAL HYPERTENSION: Primary | ICD-10-CM

## 2020-05-06 DIAGNOSIS — G43.919 INTRACTABLE MIGRAINE WITHOUT STATUS MIGRAINOSUS, UNSPECIFIED MIGRAINE TYPE: ICD-10-CM

## 2020-05-06 PROCEDURE — 99442 PR PHYSICIAN TELEPHONE EVALUATION 11-20 MIN: ICD-10-PCS | Mod: 95,,, | Performed by: INTERNAL MEDICINE

## 2020-05-06 PROCEDURE — 99442 PR PHYSICIAN TELEPHONE EVALUATION 11-20 MIN: CPT | Mod: 95,,, | Performed by: INTERNAL MEDICINE

## 2020-05-06 RX ORDER — LOSARTAN POTASSIUM 50 MG/1
50 TABLET ORAL EVERY MORNING
Qty: 90 TABLET | Refills: 3 | Status: SHIPPED | OUTPATIENT
Start: 2020-05-06 | End: 2020-09-01 | Stop reason: SDUPTHER

## 2020-05-06 RX ORDER — METFORMIN HYDROCHLORIDE 500 MG/1
TABLET, EXTENDED RELEASE ORAL
Qty: 180 TABLET | Refills: 1 | Status: SHIPPED | OUTPATIENT
Start: 2020-05-06 | End: 2020-10-15 | Stop reason: ALTCHOICE

## 2020-05-06 RX ORDER — DIVALPROEX SODIUM 500 MG/1
TABLET, FILM COATED, EXTENDED RELEASE ORAL
Qty: 90 TABLET | Refills: 1 | Status: SHIPPED | OUTPATIENT
Start: 2020-05-06 | End: 2020-09-01 | Stop reason: SDUPTHER

## 2020-05-06 NOTE — PROGRESS NOTES
Established Patient - Audio Only Telehealth Visit     The patient location is: home  The chief complaint leading to consultation is:  Elevated blood pressure  Visit type: Virtual visit with audio only (telephone)  Total time spent with patient:  20 min       The reason for the audio only service rather than synchronous audio and video virtual visit was related to technical difficulties or patient preference/necessity.     Each patient to whom I provide medical services by telemedicine is:  (1) informed of the relationship between the physician and patient and the respective role of any other health care provider with respect to management of the patient; and (2) notified that they may decline to receive medical services by telemedicine and may withdraw from such care at any time. Patient verbally consented to receive this service via voice-only telephone call.       HPI:  This is a phone visit.  The patient has been monitoring his blood pressure in finding it on average to have a high systolic in the upper 140s and low 150s.  For example this morning when his initial blood pressure was 148/99.  To checked later his blood pressure was 143/80    The only medication that he takes for the management of his blood pressure is verapamil 240 mg.  This medication has been very helpful in controlling his blood pressure until recently and verapamil also reduces the frequency and severity of his migraine headaches.    When last seen in January his Depakote for the prevention of migraines was reduced to 500 mg taking once daily and at this lower dose he continues to experience infrequent migraines.  When he does get a migraine it is less disabling.  He would like to continue on this medication request a refill.  For    He weighed himself this morning at 242 lb and has gained weight.  In the past he has a history of type 2 diabetes.  This diagnosis was made by virtue of the A1c greater than 6.5.  His most recent laboratory work  is reviewed and looks excellent.  He would like to continue on metformin and needs a refill.    Results for orders placed or performed in visit on 05/02/20   Ferritin   Result Value Ref Range    Ferritin 2 (L) 20.0 - 300.0 ng/mL   PSA, Screening   Result Value Ref Range    PSA, SCREEN 1.1 0.00 - 4.00 ng/mL   CBC auto differential   Result Value Ref Range    WBC 9.29 3.90 - 12.70 K/uL    RBC 5.39 4.60 - 6.20 M/uL    Hemoglobin 12.0 (L) 14.0 - 18.0 g/dL    Hematocrit 41.9 40.0 - 54.0 %    Mean Corpuscular Volume 78 (L) 82 - 98 fL    Mean Corpuscular Hemoglobin 22.3 (L) 27.0 - 31.0 pg    Mean Corpuscular Hemoglobin Conc 28.6 (L) 32.0 - 36.0 g/dL    RDW 18.6 (H) 11.5 - 14.5 %    Platelets 286 150 - 350 K/uL    MPV 10.5 9.2 - 12.9 fL    Immature Granulocytes 0.8 (H) 0.0 - 0.5 %    Gran # (ANC) 6.3 1.8 - 7.7 K/uL    Immature Grans (Abs) 0.07 (H) 0.00 - 0.04 K/uL    Lymph # 2.0 1.0 - 4.8 K/uL    Mono # 0.8 0.3 - 1.0 K/uL    Eos # 0.0 0.0 - 0.5 K/uL    Baso # 0.07 0.00 - 0.20 K/uL    nRBC 0 0 /100 WBC    Gran% 67.8 38.0 - 73.0 %    Lymph% 21.7 18.0 - 48.0 %    Mono% 8.9 4.0 - 15.0 %    Eosinophil% 0.0 0.0 - 8.0 %    Basophil% 0.8 0.0 - 1.9 %    Differential Method Automated    Comprehensive metabolic panel   Result Value Ref Range    Sodium 137 136 - 145 mmol/L    Potassium 4.7 3.5 - 5.1 mmol/L    Chloride 101 95 - 110 mmol/L    CO2 28 23 - 29 mmol/L    Glucose 106 70 - 110 mg/dL    BUN, Bld 16 6 - 20 mg/dL    Creatinine 0.8 0.5 - 1.4 mg/dL    Calcium 8.9 8.7 - 10.5 mg/dL    Total Protein 7.0 6.0 - 8.4 g/dL    Albumin 3.6 3.5 - 5.2 g/dL    Total Bilirubin 0.4 0.1 - 1.0 mg/dL    Alkaline Phosphatase 88 55 - 135 U/L    AST 17 10 - 40 U/L    ALT 14 10 - 44 U/L    Anion Gap 8 8 - 16 mmol/L    eGFR if African American >60.0 >60 mL/min/1.73 m^2    eGFR if non African American >60.0 >60 mL/min/1.73 m^2   Lipid panel   Result Value Ref Range    Cholesterol 133 120 - 199 mg/dL    Triglycerides 80 30 - 150 mg/dL    HDL 32 (L) 40 - 75  mg/dL    LDL Cholesterol 85.0 63.0 - 159.0 mg/dL    Hdl/Cholesterol Ratio 24.1 20.0 - 50.0 %    Total Cholesterol/HDL Ratio 4.2 2.0 - 5.0    Non-HDL Cholesterol 101 mg/dL   Hemoglobin A1c   Result Value Ref Range    Hemoglobin A1C 5.9 (H) 4.0 - 5.6 %    Estimated Avg Glucose 123 68 - 131 mg/dL   TSH   Result Value Ref Range    TSH 1.167 0.400 - 4.000 uIU/mL          Assessment and plan:    Diagnoses and all orders for this visit:    Essential hypertension  -     losartan (COZAAR) 50 MG tablet; Take 1 tablet (50 mg total) by mouth every morning.    Intractable migraine without status migrainosus, unspecified migraine type  -     divalproex ER (DEPAKOTE) 500 MG Tb24; TAKE one TABLET ONCE DAILY TO PREVENT MIGRAINE    Weight gain    H/O type 2 diabetes mellitus  -     metFORMIN (GLUCOPHAGE-XR) 500 MG XR 24hr tablet; TAKE 1 TABLET BY MOUTH TWO  TIMES DAILY WITH MEALS    Glucose intolerance (impaired glucose tolerance)  -     metFORMIN (GLUCOPHAGE-XR) 500 MG XR 24hr tablet; TAKE 1 TABLET BY MOUTH TWO  TIMES DAILY WITH MEALS      Follow up in about 3 months (around 8/6/2020).      Medication List with Changes/Refills   New Medications    LOSARTAN (COZAAR) 50 MG TABLET    Take 1 tablet (50 mg total) by mouth every morning.   Current Medications    ALBUTEROL (PROAIR HFA) 90 MCG/ACTUATION INHALER    Inhale 2 puffs into the lungs every 6 (six) hours as needed for Wheezing. Rescue    ASCORBIC ACID, VITAMIN C, (VITAMIN C) 100 MG TABLET    Take 100 mg by mouth once daily.    ATORVASTATIN (LIPITOR) 80 MG TABLET    Take 1 tablet (80 mg total) by mouth nightly.    BENZONATATE (TESSALON PERLES) 100 MG CAPSULE    Take 1 capsule (100 mg total) by mouth every 6 (six) hours as needed for Cough.    CHLORPHENIRAMINE-ACETAMINOPHEN (CORICIDIN HBP COLD AND FLU) 2-325 MG TAB    Take by mouth.    CYANOCOBALAMIN, VITAMIN B-12, (VITAMIN B-12 ORAL)    Take by mouth.    DEXTROMETHORPHAN-GUAIFENESIN  MG (MUCINEX DM)  MG PER 12 HR TABLET     Take 1 tablet by mouth every 12 (twelve) hours.    FLUOXETINE 40 MG CAPSULE    Take 1 capsule (40 mg total) by mouth every morning.    FLUTICASONE (FLONASE) 50 MCG/ACTUATION NASAL SPRAY    USE 2 SPRAYS IN EACH NOSTRIL ONCE DAILY    GENTAMICIN (GARAMYCIN) 0.3 % OPHTHALMIC SOLUTION    Place 1 drop into the left eye every 4 (four) hours.    MULTIVITAMIN (ONE DAILY MULTIVITAMIN) PER TABLET    Take 1 tablet by mouth once daily.    OMEPRAZOLE (PRILOSEC) 20 MG CAPSULE    Take 1 capsule (20 mg total) by mouth every morning. To control GERD    VERAPAMIL (VERELAN) 240 MG C24P    TAKE 1 CAPSULE DAILY AS NEEDED (BLOOD PRESSURE CONTROL AND MIGRAINE PREVENTION)   Changed and/or Refilled Medications    Modified Medication Previous Medication    DIVALPROEX ER (DEPAKOTE) 500 MG TB24 divalproex ER (DEPAKOTE) 500 MG Tb24       TAKE one TABLET ONCE DAILY TO PREVENT MIGRAINE    TAKE one TABLET ONCE DAILY TO PREVENT MIGRAINE    METFORMIN (GLUCOPHAGE-XR) 500 MG XR 24HR TABLET metFORMIN (GLUCOPHAGE-XR) 500 MG 24 hr tablet       TAKE 1 TABLET BY MOUTH TWO  TIMES DAILY WITH MEALS    TAKE 1 TABLET BY MOUTH TWO  TIMES DAILY WITH MEALS                             This service was not originating from a related E/M service provided within the previous 7 days nor will  to an E/M service or procedure within the next 24 hours or my soonest available appointment.  Prevailing standard of care was able to be met in this audio-only visit.

## 2020-07-29 ENCOUNTER — OFFICE VISIT (OUTPATIENT)
Dept: UROLOGY | Facility: CLINIC | Age: 60
End: 2020-07-29
Payer: COMMERCIAL

## 2020-07-29 VITALS
HEIGHT: 67 IN | WEIGHT: 249.56 LBS | SYSTOLIC BLOOD PRESSURE: 144 MMHG | BODY MASS INDEX: 39.17 KG/M2 | HEART RATE: 109 BPM | DIASTOLIC BLOOD PRESSURE: 87 MMHG

## 2020-07-29 DIAGNOSIS — N13.8 BPH WITH URINARY OBSTRUCTION: Primary | ICD-10-CM

## 2020-07-29 DIAGNOSIS — E11.69 TYPE 2 DIABETES MELLITUS WITH OTHER SPECIFIED COMPLICATION, WITHOUT LONG-TERM CURRENT USE OF INSULIN: ICD-10-CM

## 2020-07-29 DIAGNOSIS — E78.2 HYPERLIPIDEMIA, MIXED: ICD-10-CM

## 2020-07-29 DIAGNOSIS — I10 ESSENTIAL HYPERTENSION: ICD-10-CM

## 2020-07-29 DIAGNOSIS — N43.2 OTHER HYDROCELE: ICD-10-CM

## 2020-07-29 DIAGNOSIS — N52.01 ERECTILE DYSFUNCTION DUE TO ARTERIAL INSUFFICIENCY: ICD-10-CM

## 2020-07-29 DIAGNOSIS — N40.1 BPH WITH URINARY OBSTRUCTION: Primary | ICD-10-CM

## 2020-07-29 PROBLEM — Z86.39 H/O TYPE 2 DIABETES MELLITUS: Status: RESOLVED | Noted: 2018-02-27 | Resolved: 2020-07-29

## 2020-07-29 PROBLEM — Z12.5 SCREENING FOR PROSTATE CANCER: Status: RESOLVED | Noted: 2017-01-17 | Resolved: 2020-07-29

## 2020-07-29 PROBLEM — Z12.11 SCREEN FOR COLON CANCER: Status: RESOLVED | Noted: 2017-01-17 | Resolved: 2020-07-29

## 2020-07-29 PROBLEM — Z87.11 H/O PEPTIC ULCER: Status: RESOLVED | Noted: 2018-02-27 | Resolved: 2020-07-29

## 2020-07-29 PROCEDURE — 99999 PR PBB SHADOW E&M-EST. PATIENT-LVL IV: CPT | Mod: PBBFAC,,, | Performed by: UROLOGY

## 2020-07-29 PROCEDURE — 99999 PR PBB SHADOW E&M-EST. PATIENT-LVL IV: ICD-10-PCS | Mod: PBBFAC,,, | Performed by: UROLOGY

## 2020-07-29 PROCEDURE — 99214 PR OFFICE/OUTPT VISIT, EST, LEVL IV, 30-39 MIN: ICD-10-PCS | Mod: S$GLB,,, | Performed by: UROLOGY

## 2020-07-29 PROCEDURE — 99214 OFFICE O/P EST MOD 30 MIN: CPT | Mod: S$GLB,,, | Performed by: UROLOGY

## 2020-07-29 RX ORDER — TAMSULOSIN HYDROCHLORIDE 0.4 MG/1
0.4 CAPSULE ORAL DAILY
Qty: 30 CAPSULE | Refills: 11 | Status: SHIPPED | OUTPATIENT
Start: 2020-07-29 | End: 2020-09-01 | Stop reason: SDUPTHER

## 2020-07-29 RX ORDER — TADALAFIL 20 MG/1
20 TABLET ORAL DAILY PRN
Qty: 10 TABLET | Refills: 11 | Status: SHIPPED | OUTPATIENT
Start: 2020-07-29 | End: 2022-05-30

## 2020-07-29 NOTE — PROGRESS NOTES
CHIEF COMPLAINT:    Mr. Nicole is a 60 y.o. male presenting for a consultation at the request of Dr. Hughes. Patient presents with LUTS and ED.    PRESENTING ILLNESS:    David Nicole Jr. is a 60 y.o. male who c/o scrotal swelling.  Has been present for > 6 months.  Is not painful.  Will occasionally feel heavy.    He also c/o ED.  This has been present for > 6 months.  He's never tired a PDE-5i.  He hasn't had a T drawn.    He has LUTS.  Nocturia x 3.  + decreased FOS, + hesitancy, + post void dribble.  No hematuria.  No dysuria.  He'd like an alpha blocker.    REVIEW OF SYSTEMS:    David Nicole Jr. denies headache, blurred vision, fever, nausea, vomiting, chills, abdominal pain, chest pain, sore throat, bleeding per rectum, cough, SOB, recent loss of consciousness, recent mental status changes, seizures, dizziness, or upper or lower extremity weakness.    PAUL  1. 2  2. 3  3. 2  4. 3  5. 3      PATIENT HISTORY:    Past Medical History:   Diagnosis Date    Anxiety     H/O peptic ulcer 2/27/2018    H/O type 2 diabetes mellitus 2/27/2018    Hyperlipidemia     Hypertension     Insomnia     Joint pain     Knee pain     Prediabetes        Past Surgical History:   Procedure Laterality Date    BACK SURGERY      COLONOSCOPY N/A 2/13/2017    Procedure: COLONOSCOPY;  Surgeon: Rich James MD;  Location: [address];  Service: Endoscopy;  Laterality: N/A;    SPINE SURGERY      TONSILLECTOMY      VASECTOMY      WISDOM TOOTH EXTRACTION         Family History   Problem Relation Age of Onset    Headaches Mother     Heart disease Father        Social History     Socioeconomic History    Marital status:      Spouse name: Not on file    Number of children: Not on file    Years of education: Not on file    Highest education level: Not on file   Occupational History    Not on file   Social Needs    Financial resource strain: Not on file    Food insecurity     Worry: Not on file      Inability: Not on file    Transportation needs     Medical: Not on file     Non-medical: Not on file   Tobacco Use    Smoking status: Never Smoker    Smokeless tobacco: Never Used   Substance and Sexual Activity    Alcohol use: Yes     Comment: occasional     Drug use: No    Sexual activity: Yes   Lifestyle    Physical activity     Days per week: Not on file     Minutes per session: Not on file    Stress: Not on file   Relationships    Social connections     Talks on phone: Not on file     Gets together: Not on file     Attends Congregational service: Not on file     Active member of club or organization: Not on file     Attends meetings of clubs or organizations: Not on file     Relationship status: Not on file   Other Topics Concern    Not on file   Social History Narrative    Not on file       Allergies:  Patient has no known allergies.    Medications:    Current Outpatient Medications:     albuterol (PROAIR HFA) 90 mcg/actuation inhaler, Inhale 2 puffs into the lungs every 6 (six) hours as needed for Wheezing. Rescue, Disp: 18 g, Rfl: 0    ascorbic acid, vitamin C, (VITAMIN C) 100 MG tablet, Take 100 mg by mouth once daily., Disp: , Rfl:     atorvastatin (LIPITOR) 80 MG tablet, Take 1 tablet (80 mg total) by mouth nightly., Disp: 90 tablet, Rfl: 3    CYANOCOBALAMIN, VITAMIN B-12, (VITAMIN B-12 ORAL), Take by mouth., Disp: , Rfl:     divalproex ER (DEPAKOTE) 500 MG Tb24, TAKE one TABLET ONCE DAILY TO PREVENT MIGRAINE, Disp: 90 tablet, Rfl: 1    FLUoxetine 40 MG capsule, Take 1 capsule (40 mg total) by mouth every morning., Disp: 90 capsule, Rfl: 3    fluticasone (FLONASE) 50 mcg/actuation nasal spray, USE 2 SPRAYS IN EACH NOSTRIL ONCE DAILY, Disp: 48 g, Rfl: 3    losartan (COZAAR) 50 MG tablet, Take 1 tablet (50 mg total) by mouth every morning., Disp: 90 tablet, Rfl: 3    metFORMIN (GLUCOPHAGE-XR) 500 MG XR 24hr tablet, TAKE 1 TABLET BY MOUTH TWO  TIMES DAILY WITH MEALS, Disp: 180 tablet, Rfl:  1    multivitamin (ONE DAILY MULTIVITAMIN) per tablet, Take 1 tablet by mouth once daily., Disp: , Rfl:     omeprazole (PRILOSEC) 20 MG capsule, Take 1 capsule (20 mg total) by mouth every morning. To control GERD, Disp: 90 capsule, Rfl: 3    verapamil (VERELAN) 240 MG C24P, TAKE 1 CAPSULE DAILY AS NEEDED (BLOOD PRESSURE CONTROL AND MIGRAINE PREVENTION), Disp: 90 capsule, Rfl: 3    benzonatate (TESSALON PERLES) 100 MG capsule, Take 1 capsule (100 mg total) by mouth every 6 (six) hours as needed for Cough. (Patient not taking: Reported on 7/29/2020), Disp: 30 capsule, Rfl: 1    chlorpheniramine-acetaminophen (CORICIDIN HBP COLD AND FLU) 2-325 mg Tab, Take by mouth., Disp: , Rfl:     dextromethorphan-guaifenesin  mg (MUCINEX DM)  mg per 12 hr tablet, Take 1 tablet by mouth every 12 (twelve) hours., Disp: , Rfl:     gentamicin (GARAMYCIN) 0.3 % ophthalmic solution, Place 1 drop into the left eye every 4 (four) hours. (Patient not taking: Reported on 7/29/2020), Disp: 5 mL, Rfl: 0    PHYSICAL EXAMINATION:    The patient generally appears in good health, is appropriately interactive, and is in no apparent distress.     Eyes: anicteric sclerae, moist conjunctivae; no lid-lag; PERRLA     HENT: Atraumatic; oropharynx clear with moist mucous membranes and no mucosal ulcerations;normal hard and soft palate.  No evidence of lymphadenopathy.    Neck: Trachea midline.  No thyromegaly.    Musculoskeletal: No abnormal gait.    Skin: No lesions.    Mental: Cooperative with normal affect.  Is oriented to time, place, and person.    Neuro: Grossly intact.    Chest: Normal inspiratory effort.   No accessory muscles.  No audible wheezes.  Respirations symmetric on inspiration and expiration.    Heart: Regular rhythm.      Abdomen:  Soft, non-tender. No masses or organomegaly. Bladder is not palpable. No evidence of flank discomfort. No evidence of inguinal hernia.    Genitourinary: The penis is circumcised with no  evidence of plaques or induration. The urethral meatus is normal. The testes, epididymides, and cord structures are normal in size and contour bilaterally. He has a R hydrocele.    Normal anal sphincter tone. No rectal mass.    The prostate is smooth. Normal landmarks. Lateral sulci. Median furrow intact.  No nodularity or induration. Seminal vesicles are normal.    Extremities: No clubbing, cyanosis, or edema      LABS:      Lab Results   Component Value Date    PSA 1.1 05/02/2020    PSA 1.3 04/24/2019    PSA 1.1 02/27/2018       IMPRESSION:    Encounter Diagnoses   Name Primary?    BPH with urinary obstruction Yes    Erectile dysfunction due to arterial insufficiency     Other hydrocele     Type 2 diabetes mellitus with other specified complication, without long-term current use of insulin     Hyperlipidemia, mixed     Essential hypertension    DM, controlled  HTN, controlled  Hyperlipidemia, controlled      PLAN:    1. Discussed that he has a R hydrocele.  Recommend observation given that it is asymptomatic.  2. Discussed options for his LUTS.  He'd like flomax. Side effects discussed.  A new Rx was given  3. Discussed options for his ED.  He'd like Cialis. Side effects discussed.  A new Rx was given  4. Will draw a T for the ED as this hasn't been done.    Copy to: Saul.

## 2020-07-29 NOTE — LETTER
July 29, 2020        Sejal Hughes MD  [address]  [place] [address]             [facility] - Urology 4th Floor  [address]  [place] [address]  Phone: [phone]   Patient: David Nicole Jr.   MR Number: [id number]   YOB: 1960   Date of Visit: 7/29/2020       Dear Dr. Hughes:    Thank you for referring David Nicole to me for evaluation. Attached you will find relevant portions of my assessment and plan of care.    If you have questions, please do not hesitate to call me. I look forward to following David Nicole along with you.    Sincerely,      Galdino Deal MD            CC  No Recipients    Enclosure

## 2020-08-01 ENCOUNTER — OFFICE VISIT (OUTPATIENT)
Dept: URGENT CARE | Facility: CLINIC | Age: 60
End: 2020-08-01
Payer: COMMERCIAL

## 2020-08-01 VITALS
RESPIRATION RATE: 19 BRPM | DIASTOLIC BLOOD PRESSURE: 89 MMHG | HEART RATE: 104 BPM | OXYGEN SATURATION: 97 % | SYSTOLIC BLOOD PRESSURE: 150 MMHG | TEMPERATURE: 99 F

## 2020-08-01 DIAGNOSIS — H10.9 CONJUNCTIVITIS OF RIGHT EYE, UNSPECIFIED CONJUNCTIVITIS TYPE: Primary | ICD-10-CM

## 2020-08-01 PROCEDURE — 99214 PR OFFICE/OUTPT VISIT, EST, LEVL IV, 30-39 MIN: ICD-10-PCS | Mod: S$GLB,,, | Performed by: FAMILY MEDICINE

## 2020-08-01 PROCEDURE — 99214 OFFICE O/P EST MOD 30 MIN: CPT | Mod: S$GLB,,, | Performed by: FAMILY MEDICINE

## 2020-08-01 RX ORDER — OFLOXACIN 3 MG/ML
1 SOLUTION/ DROPS OPHTHALMIC 4 TIMES DAILY
Qty: 5 ML | Refills: 0 | Status: SHIPPED | OUTPATIENT
Start: 2020-08-01 | End: 2021-04-05

## 2020-08-01 NOTE — PATIENT INSTRUCTIONS
PLEASE READ YOUR DISCHARGE INSTRUCTIONS ENTIRELY AS IT CONTAINS IMPORTANT INFORMATION.    Please return here or go to the Emergency Department for any concerns or worsening of condition.    If you were prescribed antibiotics, please take them to completion.    If not allergic, please take over the counter Tylenol (Acetaminophen) and/or Motrin (Ibuprofen) as directed for control of pain and/or fever.  Please follow up with your primary care doctor or specialist as needed.    If you smoke, please stop smoking.    Please return or see your primary care doctor if you develop new or worsening symptoms.     Please arrange follow up with your primary medical clinic as soon as possible. You must understand that you've received an Urgent Care treatment only and that you may be released before all of your medical problems are known or treated. You, the patient, will arrange for follow up as instructed. If your symptoms worsen or fail to improve you should go to the Emergency Room.  Conjunctivitis, Nonspecific    The membrane that covers the white part of your eye (the conjunctiva) is inflamed. Inflammation happens when your body responds to an injury, allergic reaction, infection, or illness. Symptoms of inflammation in the eye may include redness, irritation, itching, swelling, or burning. These symptoms should go away within the next 24 hours. Conjunctivitis may be related to a particle that was in your eye. If so, it may wash out with your tears or irrigation treatment. Being exposed to liquid chemicals or fumes may also cause this reaction.   Home care  · Apply a cold pack (ice in a plastic bag, wrapped in a towel) over the eye for 20 minutes at a time. This will reduce pain.  · Artificial tears may be prescribed to reduce irritation or redness.  These should be used 3 to 4 times a day.  · You may use acetaminophen or ibuprofen to control pain, unless another medicine was prescribed.(Note: If you have chronic liver or  kidney disease, or if you have ever had a stomach ulcer or gastrointestinal bleeding, talk with your healthcare provider before using these medicines.)  Follow-up care  Follow up with your healthcare provider, or as advised.  When to seek medical advice  Call your healthcare provider right away if any of these occur:  · Increased eyelid swelling  · Increased eye pain  · Increased redness or drainage from the eye  · Increased blurry vision or increased sensitivity to light  · Failure of normal vision to return within 24 to 48 hours  Date Last Reviewed: 6/14/2015  © 1672-5536 [employer]. [address] [place] [address]. All rights reserved. This information is not intended as a substitute for professional medical care. Always follow your healthcare professional's instructions.

## 2020-08-01 NOTE — PROGRESS NOTES
Subjective:       Patient ID: David Nicole Jr. is a 60 y.o. male.    Vitals:  temperature is 98.5 °F (36.9 °C). His blood pressure is 150/89 (abnormal) and his pulse is 104. His respiration is 19 and oxygen saturation is 97%.     Chief Complaint: Eye Problem    Pt presents with complaint of right eye irritation, discharge for last 1 week.  Denies loss of vision, headache, dizziness, eye pain, nausea/vomiting.  Denies cough, sore throat, runny nose.  Denies the use of contact lenses    Eye Problem   Both eyes are affected.This is a new problem. The current episode started 1 to 4 weeks ago. The problem occurs constantly. The problem has been unchanged. There was no injury mechanism. The pain is at a severity of 6/10. The pain is mild. Associated symptoms include blurred vision, an eye discharge and itching. Pertinent negatives include no double vision, eye redness, fever, nausea, photophobia or vomiting. He has tried eye drops for the symptoms. The treatment provided no relief.       Constitution: Negative for chills and fever.   HENT: Negative for congestion and sinus pain.    Eyes: Positive for eye discharge, eye itching, eye pain, blurred vision and eyelid swelling. Negative for eye trauma, foreign body in eye, eye redness, photophobia, vision loss and double vision.   Gastrointestinal: Negative for nausea and vomiting.   Skin: Negative for rash.   Allergic/Immunologic: Negative for seasonal allergies and itching.   Neurological: Negative for headaches.       Objective:      Physical Exam   Constitutional: He is oriented to person, place, and time. He appears well-developed. He is cooperative.  Non-toxic appearance. He does not appear ill. No distress.   HENT:   Head: Normocephalic and atraumatic.   Ears:   Right Ear: Hearing, tympanic membrane, external ear and ear canal normal.   Left Ear: Hearing, tympanic membrane, external ear and ear canal normal.   Nose: Nose normal. No mucosal edema, rhinorrhea or nasal  deformity. No epistaxis. Right sinus exhibits no maxillary sinus tenderness and no frontal sinus tenderness. Left sinus exhibits no maxillary sinus tenderness and no frontal sinus tenderness.   Mouth/Throat: Uvula is midline, oropharynx is clear and moist and mucous membranes are normal. No trismus in the jaw. Normal dentition. No uvula swelling. No posterior oropharyngeal erythema.   Eyes: Pupils are equal, round, and reactive to light. Conjunctivae and lids are normal. Right eye exhibits no discharge. Left eye exhibits no discharge. No scleral icterus.      Comments: Right eye:  Positive mild conjunctival erythema.  Positive slight lower eyelid swelling.  Visual acuity at baseline.  No corneal abrasion found on fluorescein test.   Neck: Trachea normal, normal range of motion, full passive range of motion without pain and phonation normal. Neck supple.   Cardiovascular: Normal rate, regular rhythm, normal heart sounds and normal pulses.   Pulmonary/Chest: Effort normal and breath sounds normal. No respiratory distress.   Abdominal: Soft. Normal appearance and bowel sounds are normal. He exhibits no distension, no pulsatile midline mass and no mass. There is no abdominal tenderness.   Musculoskeletal: Normal range of motion.         General: No deformity.   Neurological: He is alert and oriented to person, place, and time. He exhibits normal muscle tone. Coordination normal.   Skin: Skin is warm, dry, intact, not diaphoretic and not pale. Psychiatric: His speech is normal and behavior is normal. Judgment and thought content normal.   Nursing note and vitals reviewed.        Assessment:       1. Conjunctivitis of right eye, unspecified conjunctivitis type        Plan:         Conjunctivitis of right eye, unspecified conjunctivitis type  -     ofloxacin (OCUFLOX) 0.3 % ophthalmic solution; Place 1 drop into both eyes 4 (four) times daily.  Dispense: 5 mL; Refill: 0        PLEASE READ YOUR DISCHARGE INSTRUCTIONS  ENTIRELY AS IT CONTAINS IMPORTANT INFORMATION.    Please return here or go to the Emergency Department for any concerns or worsening of condition.    If you were prescribed antibiotics, please take them to completion.    If not allergic, please take over the counter Tylenol (Acetaminophen) and/or Motrin (Ibuprofen) as directed for control of pain and/or fever.  Please follow up with your primary care doctor or specialist as needed.    If you smoke, please stop smoking.    Please return or see your primary care doctor if you develop new or worsening symptoms.     Please arrange follow up with your primary medical clinic as soon as possible. You must understand that you've received an Urgent Care treatment only and that you may be released before all of your medical problems are known or treated. You, the patient, will arrange for follow up as instructed. If your symptoms worsen or fail to improve you should go to the Emergency Room.  Conjunctivitis, Nonspecific    The membrane that covers the white part of your eye (the conjunctiva) is inflamed. Inflammation happens when your body responds to an injury, allergic reaction, infection, or illness. Symptoms of inflammation in the eye may include redness, irritation, itching, swelling, or burning. These symptoms should go away within the next 24 hours. Conjunctivitis may be related to a particle that was in your eye. If so, it may wash out with your tears or irrigation treatment. Being exposed to liquid chemicals or fumes may also cause this reaction.   Home care  · Apply a cold pack (ice in a plastic bag, wrapped in a towel) over the eye for 20 minutes at a time. This will reduce pain.  · Artificial tears may be prescribed to reduce irritation or redness.  These should be used 3 to 4 times a day.  · You may use acetaminophen or ibuprofen to control pain, unless another medicine was prescribed.(Note: If you have chronic liver or kidney disease, or if you have ever had a  stomach ulcer or gastrointestinal bleeding, talk with your healthcare provider before using these medicines.)  Follow-up care  Follow up with your healthcare provider, or as advised.  When to seek medical advice  Call your healthcare provider right away if any of these occur:  · Increased eyelid swelling  · Increased eye pain  · Increased redness or drainage from the eye  · Increased blurry vision or increased sensitivity to light  · Failure of normal vision to return within 24 to 48 hours  Date Last Reviewed: 6/14/2015  © 1681-8362 [facility]. [address] [place] [address]. All rights reserved. This information is not intended as a substitute for professional medical care. Always follow your healthcare professional's instructions.      I advised the patient follow-up with this ophthalmologist within next 2-5 days.  Patient agreed.

## 2020-09-01 ENCOUNTER — IMMUNIZATION (OUTPATIENT)
Dept: PHARMACY | Facility: CLINIC | Age: 60
End: 2020-09-01
Payer: COMMERCIAL

## 2020-09-01 ENCOUNTER — OFFICE VISIT (OUTPATIENT)
Dept: INTERNAL MEDICINE | Facility: CLINIC | Age: 60
End: 2020-09-01
Payer: COMMERCIAL

## 2020-09-01 VITALS
WEIGHT: 253.5 LBS | BODY MASS INDEX: 39.79 KG/M2 | SYSTOLIC BLOOD PRESSURE: 128 MMHG | OXYGEN SATURATION: 98 % | DIASTOLIC BLOOD PRESSURE: 72 MMHG | HEIGHT: 67 IN | HEART RATE: 92 BPM

## 2020-09-01 DIAGNOSIS — Z23 NEED FOR 23-POLYVALENT PNEUMOCOCCAL POLYSACCHARIDE VACCINE: ICD-10-CM

## 2020-09-01 DIAGNOSIS — K21.9 GASTROESOPHAGEAL REFLUX DISEASE WITHOUT ESOPHAGITIS: ICD-10-CM

## 2020-09-01 DIAGNOSIS — E61.1 IRON DEFICIENCY: Primary | ICD-10-CM

## 2020-09-01 DIAGNOSIS — E11.9 TYPE 2 DIABETES MELLITUS WITHOUT COMPLICATION, WITHOUT LONG-TERM CURRENT USE OF INSULIN: ICD-10-CM

## 2020-09-01 DIAGNOSIS — G43.919 INTRACTABLE MIGRAINE WITHOUT STATUS MIGRAINOSUS, UNSPECIFIED MIGRAINE TYPE: ICD-10-CM

## 2020-09-01 DIAGNOSIS — I10 ESSENTIAL HYPERTENSION: ICD-10-CM

## 2020-09-01 DIAGNOSIS — E78.2 HYPERLIPIDEMIA, MIXED: ICD-10-CM

## 2020-09-01 DIAGNOSIS — N13.8 BPH WITH URINARY OBSTRUCTION: ICD-10-CM

## 2020-09-01 DIAGNOSIS — F33.41 RECURRENT MAJOR DEPRESSIVE DISORDER, IN PARTIAL REMISSION: ICD-10-CM

## 2020-09-01 DIAGNOSIS — N40.1 BPH WITH URINARY OBSTRUCTION: ICD-10-CM

## 2020-09-01 PROCEDURE — 93005 ELECTROCARDIOGRAM TRACING: CPT | Mod: S$GLB,,, | Performed by: INTERNAL MEDICINE

## 2020-09-01 PROCEDURE — 99214 OFFICE O/P EST MOD 30 MIN: CPT | Mod: S$GLB,,, | Performed by: INTERNAL MEDICINE

## 2020-09-01 PROCEDURE — 93010 ELECTROCARDIOGRAM REPORT: CPT | Mod: S$GLB,,, | Performed by: INTERNAL MEDICINE

## 2020-09-01 PROCEDURE — 99999 PR PBB SHADOW E&M-EST. PATIENT-LVL V: ICD-10-PCS | Mod: PBBFAC,,, | Performed by: INTERNAL MEDICINE

## 2020-09-01 PROCEDURE — 93010 EKG 12-LEAD: ICD-10-PCS | Mod: S$GLB,,, | Performed by: INTERNAL MEDICINE

## 2020-09-01 PROCEDURE — 93005 EKG 12-LEAD: ICD-10-PCS | Mod: S$GLB,,, | Performed by: INTERNAL MEDICINE

## 2020-09-01 PROCEDURE — 99999 PR PBB SHADOW E&M-EST. PATIENT-LVL V: CPT | Mod: PBBFAC,,, | Performed by: INTERNAL MEDICINE

## 2020-09-01 PROCEDURE — 99214 PR OFFICE/OUTPT VISIT, EST, LEVL IV, 30-39 MIN: ICD-10-PCS | Mod: S$GLB,,, | Performed by: INTERNAL MEDICINE

## 2020-09-01 RX ORDER — TAMSULOSIN HYDROCHLORIDE 0.4 MG/1
0.4 CAPSULE ORAL DAILY
Qty: 90 CAPSULE | Refills: 3 | Status: SHIPPED | OUTPATIENT
Start: 2020-09-01 | End: 2021-04-23

## 2020-09-01 RX ORDER — DIVALPROEX SODIUM 250 MG/1
TABLET, FILM COATED, EXTENDED RELEASE ORAL
Qty: 90 TABLET | Refills: 3 | Status: SHIPPED | OUTPATIENT
Start: 2020-09-01 | End: 2021-04-23 | Stop reason: ALTCHOICE

## 2020-09-01 NOTE — PROGRESS NOTES
Subjective:       Patient ID: David Nicole Jr. is a 60 y.o. male.    Chief Complaint: Follow-up    This dictation was performed using using [employer].   All of his problems are discussed  Previous labs have been reviewed  Diet and activity discussed  Screenings and immunizations discussed  Diabetes Management Status    Statin: Taking  ACE/ARB: Taking    Screening or Prevention Patient's value Goal Complete/Controlled?   HgA1C Testing and Control   Lab Results   Component Value Date    HGBA1C 5.9 (H) 05/02/2020      Annually/Less than 8% Yes   Lipid profile : 05/02/2020 Annually Yes   LDL control Lab Results   Component Value Date    LDLCALC 85.0 05/02/2020    Annually/Less than 100 mg/dl  Yes   Nephropathy screening No results found for: LABMICR  Lab Results   Component Value Date    PROTEINUA Negative 04/01/2019    Annually No   Blood pressure BP Readings from Last 1 Encounters:   09/01/20 128/72    Less than 140/90 Yes   Dilated retinal exam : 08/17/2020 Annually Yes   Foot exam   : 09/01/2020 Annually Yes       HPI  Review of Systems  Review of systems is negative unless noted.  Objective:      Physical Exam  Constitutional:       General: He is not in acute distress.  HENT:      Head: Atraumatic.   Eyes:      General: No scleral icterus.     Conjunctiva/sclera: Conjunctivae normal.   Neck:      Musculoskeletal: Neck supple.   Cardiovascular:      Rate and Rhythm: Normal rate and regular rhythm.   Pulmonary:      Effort: Pulmonary effort is normal.      Breath sounds: Normal breath sounds.   Abdominal:      Palpations: Abdomen is soft.      Tenderness: There is no abdominal tenderness.   Lymphadenopathy:      Cervical: No cervical adenopathy.   Skin:     General: Skin is warm and dry.   Neurological:      Mental Status: He is alert and oriented to person, place, and time.   Psychiatric:         Behavior: Behavior normal.         Assessment:       1. Iron deficiency    2. Gastroesophageal reflux disease without  esophagitis    3. Type 2 diabetes mellitus without complication, without long-term current use of insulin    4. Hyperlipidemia, mixed    5. Intractable migraine without status migrainosus, unspecified migraine type    6. BPH with urinary obstruction    7. Recurrent major depressive disorder, in partial remission    8. Essential hypertension    9. Need for 23-polyvalent pneumococcal polysaccharide vaccine        Plan:   David was seen today for follow-up.    Diagnoses and all orders for this visit:    Iron deficiency, stop taking an over-the-counter iron supple    Gastroesophageal reflux disease without esophagitis, continue daily PPI omeprazole 20 mg is working    Type 2 diabetes mellitus without complication, without long-term current use of insulin, monitor A1c.  No need for home glucose monitoring.  -     Hemoglobin A1C; Future    Hyperlipidemia, mixed    Intractable migraine without status migrainosus, unspecified migraine type  -     divalproex ER (DEPAKOTE ER) 250 MG 24 hr tablet; TAKE one TABLET ONCE DAILY TO PREVENT MIGRAINE    BPH with urinary obstruction  -     tamsulosin (FLOMAX) 0.4 mg Cap; Take 1 capsule (0.4 mg total) by mouth once daily.    Recurrent major depressive disorder, in partial remission    Essential hypertension  -     losartan (COZAAR) 100 MG Tab; Take 1 tablet (100 mg total) by mouth nightly.  -     Comprehensive metabolic panel; Future  -     EKG 12-lead; Future  -     EKG 12-lead    Need for 23-polyvalent pneumococcal polysaccharide vaccine    Medication List with Changes/Refills   Current Medications    ALBUTEROL (PROAIR HFA) 90 MCG/ACTUATION INHALER    Inhale 2 puffs into the lungs every 6 (six) hours as needed for Wheezing. Rescue    ASCORBIC ACID, VITAMIN C, (VITAMIN C) 100 MG TABLET    Take 100 mg by mouth once daily.    ATORVASTATIN (LIPITOR) 80 MG TABLET    Take 1 tablet (80 mg total) by mouth nightly.    BENZONATATE (TESSALON PERLES) 100 MG CAPSULE    Take 1 capsule (100 mg  total) by mouth every 6 (six) hours as needed for Cough.    CHLORPHENIRAMINE-ACETAMINOPHEN (CORICIDIN HBP COLD AND FLU) 2-325 MG TAB    Take by mouth.    CYANOCOBALAMIN, VITAMIN B-12, (VITAMIN B-12 ORAL)    Take by mouth.    DEXTROMETHORPHAN-GUAIFENESIN  MG (MUCINEX DM)  MG PER 12 HR TABLET    Take 1 tablet by mouth every 12 (twelve) hours.    FLUOXETINE 40 MG CAPSULE    Take 1 capsule (40 mg total) by mouth every morning.    FLUTICASONE (FLONASE) 50 MCG/ACTUATION NASAL SPRAY    USE 2 SPRAYS IN EACH NOSTRIL ONCE DAILY    GENTAMICIN (GARAMYCIN) 0.3 % OPHTHALMIC SOLUTION    Place 1 drop into the left eye every 4 (four) hours.    METFORMIN (GLUCOPHAGE-XR) 500 MG XR 24HR TABLET    TAKE 1 TABLET BY MOUTH TWO  TIMES DAILY WITH MEALS    MULTIVITAMIN (ONE DAILY MULTIVITAMIN) PER TABLET    Take 1 tablet by mouth once daily.    OFLOXACIN (OCUFLOX) 0.3 % OPHTHALMIC SOLUTION    Place 1 drop into both eyes 4 (four) times daily.    OMEPRAZOLE (PRILOSEC) 20 MG CAPSULE    Take 1 capsule (20 mg total) by mouth every morning. To control GERD    TADALAFIL (CIALIS) 20 MG TAB    Take 1 tablet (20 mg total) by mouth daily as needed. Take 1 hour before intercourse    VERAPAMIL (VERELAN) 240 MG C24P    TAKE 1 CAPSULE DAILY AS NEEDED (BLOOD PRESSURE CONTROL AND MIGRAINE PREVENTION)   Changed and/or Refilled Medications    Modified Medication Previous Medication    DIVALPROEX ER (DEPAKOTE ER) 250 MG 24 HR TABLET divalproex ER (DEPAKOTE) 500 MG Tb24       TAKE one TABLET ONCE DAILY TO PREVENT MIGRAINE    TAKE one TABLET ONCE DAILY TO PREVENT MIGRAINE    LOSARTAN (COZAAR) 100 MG TAB losartan (COZAAR) 50 MG tablet       Take 1 tablet (100 mg total) by mouth nightly.    Take 1 tablet (50 mg total) by mouth every morning.    TAMSULOSIN (FLOMAX) 0.4 MG CAP tamsulosin (FLOMAX) 0.4 mg Cap       Take 1 capsule (0.4 mg total) by mouth once daily.    Take 1 capsule (0.4 mg total) by mouth once daily.     Follow up in about 6 months (around  3/1/2021).

## 2020-09-14 NOTE — PATIENT INSTRUCTIONS
Results for orders placed or performed in visit on 07/29/20   Testosterone   Result Value Ref Range    Testosterone, Total 475 304 - 1227 ng/dL

## 2020-10-15 ENCOUNTER — TELEPHONE (OUTPATIENT)
Dept: INTERNAL MEDICINE | Facility: CLINIC | Age: 60
End: 2020-10-15

## 2020-10-15 DIAGNOSIS — E11.9 TYPE 2 DIABETES MELLITUS WITHOUT COMPLICATION, WITHOUT LONG-TERM CURRENT USE OF INSULIN: Primary | ICD-10-CM

## 2020-10-15 RX ORDER — METFORMIN HYDROCHLORIDE 500 MG/1
500 TABLET ORAL
Qty: 90 TABLET | Refills: 3 | Status: SHIPPED | OUTPATIENT
Start: 2020-10-15 | End: 2021-04-23 | Stop reason: SDUPTHER

## 2020-10-22 ENCOUNTER — PATIENT MESSAGE (OUTPATIENT)
Dept: INTERNAL MEDICINE | Facility: CLINIC | Age: 60
End: 2020-10-22

## 2020-10-22 DIAGNOSIS — G47.33 OSA (OBSTRUCTIVE SLEEP APNEA): Primary | ICD-10-CM

## 2020-11-14 ENCOUNTER — LAB VISIT (OUTPATIENT)
Dept: LAB | Facility: HOSPITAL | Age: 60
End: 2020-11-14
Attending: INTERNAL MEDICINE
Payer: COMMERCIAL

## 2020-11-14 DIAGNOSIS — E11.9 TYPE 2 DIABETES MELLITUS WITHOUT COMPLICATION, WITHOUT LONG-TERM CURRENT USE OF INSULIN: ICD-10-CM

## 2020-11-14 DIAGNOSIS — I10 ESSENTIAL HYPERTENSION: ICD-10-CM

## 2020-11-14 LAB
ALBUMIN SERPL BCP-MCNC: 3.6 G/DL (ref 3.5–5.2)
ALP SERPL-CCNC: 82 U/L (ref 55–135)
ALT SERPL W/O P-5'-P-CCNC: 14 U/L (ref 10–44)
ANION GAP SERPL CALC-SCNC: 7 MMOL/L (ref 8–16)
AST SERPL-CCNC: 21 U/L (ref 10–40)
BILIRUB SERPL-MCNC: 0.6 MG/DL (ref 0.1–1)
BUN SERPL-MCNC: 19 MG/DL (ref 6–20)
CALCIUM SERPL-MCNC: 9 MG/DL (ref 8.7–10.5)
CHLORIDE SERPL-SCNC: 104 MMOL/L (ref 95–110)
CO2 SERPL-SCNC: 28 MMOL/L (ref 23–29)
CREAT SERPL-MCNC: 0.8 MG/DL (ref 0.5–1.4)
EST. GFR  (AFRICAN AMERICAN): >60 ML/MIN/1.73 M^2
EST. GFR  (NON AFRICAN AMERICAN): >60 ML/MIN/1.73 M^2
ESTIMATED AVG GLUCOSE: 123 MG/DL (ref 68–131)
GLUCOSE SERPL-MCNC: 104 MG/DL (ref 70–110)
HBA1C MFR BLD HPLC: 5.9 % (ref 4–5.6)
POTASSIUM SERPL-SCNC: 5.4 MMOL/L (ref 3.5–5.1)
PROT SERPL-MCNC: 6.8 G/DL (ref 6–8.4)
SODIUM SERPL-SCNC: 139 MMOL/L (ref 136–145)

## 2020-11-14 PROCEDURE — 83036 HEMOGLOBIN GLYCOSYLATED A1C: CPT

## 2020-11-14 PROCEDURE — 80053 COMPREHEN METABOLIC PANEL: CPT

## 2020-11-14 PROCEDURE — 36415 COLL VENOUS BLD VENIPUNCTURE: CPT

## 2020-11-24 ENCOUNTER — OFFICE VISIT (OUTPATIENT)
Dept: SLEEP MEDICINE | Facility: CLINIC | Age: 60
End: 2020-11-24
Payer: COMMERCIAL

## 2020-11-24 VITALS
BODY MASS INDEX: 38.74 KG/M2 | SYSTOLIC BLOOD PRESSURE: 133 MMHG | HEIGHT: 67 IN | HEART RATE: 96 BPM | WEIGHT: 246.81 LBS | DIASTOLIC BLOOD PRESSURE: 73 MMHG

## 2020-11-24 DIAGNOSIS — R05.9 COUGH: ICD-10-CM

## 2020-11-24 DIAGNOSIS — G47.33 OSA (OBSTRUCTIVE SLEEP APNEA): ICD-10-CM

## 2020-11-24 PROCEDURE — 99999 PR PBB SHADOW E&M-EST. PATIENT-LVL IV: ICD-10-PCS | Mod: PBBFAC,,, | Performed by: INTERNAL MEDICINE

## 2020-11-24 PROCEDURE — 99999 PR PBB SHADOW E&M-EST. PATIENT-LVL IV: CPT | Mod: PBBFAC,,, | Performed by: INTERNAL MEDICINE

## 2020-11-24 PROCEDURE — 99204 PR OFFICE/OUTPT VISIT, NEW, LEVL IV, 45-59 MIN: ICD-10-PCS | Mod: S$GLB,,, | Performed by: INTERNAL MEDICINE

## 2020-11-24 PROCEDURE — 99204 OFFICE O/P NEW MOD 45 MIN: CPT | Mod: S$GLB,,, | Performed by: INTERNAL MEDICINE

## 2020-11-24 NOTE — PROGRESS NOTES
"NEW PATIENT VISIT    The patient has been referred by Dr. Hughes.    Chief complaint: daytime sleepiness    HPI:  David Nicole Jr.  is a pleasant 60 y.o. male with past medical history significant for migraine headaches, HTN, hyperlipidemia, GERD, chronic headaches, ED, morbid obesity and BPH with urinary obstruction who presents for management of HILARIA (dx circa 2010, claustrophobia with FFM)  Referred by Sejal Hughes MD   Patient reports daytime sleepiness, irritability and weight gain.    Prior sleep studies:  Yes    bed partner:   Sleeps with wife  snoring:   Yes  gasping arousals: Yes  nocturia:  3-4 times a night  AM headache:  Yes  unrefreshing sleep: Yes    Daytime sleepiness: Yes  Sleepy driving:  Yes when on passenger side    H/H hallucinations: Denies  Sleep paralysis: Denies  Cataplexy:  Denies    RLS:   Yes    SLEEP SCHEDULE:  Bed Time:  6048-0959  Environment:    Lights out:    Hypnotic:    Alcohol:    Sleep Latency: 40 minutes  Arousals:  Yes  Back to sleep: 40-60 minutes  Wake time:    Refreshed?    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  The patient's allergies and medications were reviewed.  The patient's past medical, family, and social history were reviewed.    Vitals:    11/24/20 0928   BP: 133/73   Pulse: 96   Weight: 112 kg (246 lb 12.9 oz)   Height: 5' 7" (1.702 m)       STOP-BANG Questionnaire  Loud snoring? Yes  Tired?  Yes  Apneas? Yes  HTN?  Yes  BMI >35 Yes  Age >50 Yes  Neck >16" Yes  Male?  Yes    Total  8/8      Physical Exam:  GEN: "   Well-appearing, vitals reviewed  SKIN:  No rash on the face or bridge of the nose  EYES:  No icterus, pupils equal  HEENT: Mallampati 4  no significant retrognathia  CV:  Regular rate and rhythm    no lower extremity edema  RESP:  Normal effort    crackles at the bases which clear with deep inspiration  MSK:  Normal gait and station  Psych:  Appropriate affect, demonstrates insight  Neuro:  no resting tremor    RECORDS REVIEWED:    Prior sleep studies are unavailable.    ASSESSMENT:    HILARIA: Dx 2010, AHI = unk.     HYPERSOMNOLENCE: ESS = 10/24. At this point, suspect primarily due to underlying HILARIA.    NOCTURIA: underlying HILARIA may be contributing    ASSOCIATED CONDITIONS:, hypertension, GERD    PLAN:    -he is eager to give CPAP another try  -trial of nasal mask  -will start with auto-CPAP, lower settings to desensitize  -requires an in-lab study for insurance purposes to requalify for PAP therapy   -we discussed trial of PAP therapy if HILARIA is present    -the patient was warned to never drive when sleepy    -will arrange RTC depending on results of sleep testing.    The patient was counseled on the pathophysiology of obstructive sleep apnea and mask and machine desensitization    The patient was given open opportunity to ask questions and/or express concerns about treatment plan.   All questions/concerns were discussed.     Two patient identifiers used prior to evaluation.       Preferred method of contact: portal

## 2020-11-24 NOTE — LETTER
November 24, 2020      Sejal Hughes MD  [address]  [facility] [address]           [facility] [place], [id number]  [address]  [facility] [address]  Phone: [phone]          Patient: David Nicole Jr.   MR Number: [id number]   YOB: 1960   Date of Visit: 11/24/2020       Dear Dr. Sejal Hughes:    Thank you for referring David Nicole to me for evaluation. Attached you will find relevant portions of my assessment and plan of care.    If you have questions, please do not hesitate to call me. I look forward to following David Nicole along with you.    Sincerely,    Bebeto Howell MD    Enclosure  CC:  No Recipients    If you would like to receive this communication electronically, please contact [email or web address] or [phone] to request more information on [employer] access.    For providers and/or their staff who would like to refer a patient to [facility], please contact us through our one-stop-shop provider referral line, [facility], at [phone].    If you feel you have received this communication in error or would no longer like to receive these types of communications, please e-mail [email or web address]

## 2020-12-02 ENCOUNTER — TELEPHONE (OUTPATIENT)
Dept: SLEEP MEDICINE | Facility: OTHER | Age: 60
End: 2020-12-02

## 2020-12-03 ENCOUNTER — TELEPHONE (OUTPATIENT)
Dept: SLEEP MEDICINE | Facility: CLINIC | Age: 60
End: 2020-12-03

## 2020-12-03 DIAGNOSIS — G47.33 OSA (OBSTRUCTIVE SLEEP APNEA): Primary | ICD-10-CM

## 2020-12-03 NOTE — TELEPHONE ENCOUNTER
staff returned call to patient    Pt stated he wants to know if its possible for him switch from sleep study to home study      Staff informed patient that the sleep study was approved and if  put in new order for home study, It likely will have to go through the prior approval process again    Staff informed pt patient that we will follow up upon response from     Pt verbalized understanding

## 2020-12-03 NOTE — TELEPHONE ENCOUNTER
----- Message from Darryl Parker, Patient Care Assistant sent at 12/3/2020  1:30 PM CST -----  Name of Who is Calling: MANOLO SOSA JR. [[id number]]    What is the request in detail: Requesting to change sleep study to a home sleep study. Please contact to further discuss and advise      Can the clinic reply by [facility]: No    What Number to Call Back if not in [facility]: [phone]

## 2020-12-03 NOTE — TELEPHONE ENCOUNTER
Spoke with patient and informed him that  put in the order for home study and we will wait to hear from insurance company which sometimes takes about 2 weeks     Pt verbalized understanding

## 2020-12-08 ENCOUNTER — TELEPHONE (OUTPATIENT)
Dept: SLEEP MEDICINE | Facility: CLINIC | Age: 60
End: 2020-12-08

## 2020-12-08 ENCOUNTER — TELEPHONE (OUTPATIENT)
Dept: INTERNAL MEDICINE | Facility: CLINIC | Age: 60
End: 2020-12-08

## 2020-12-08 DIAGNOSIS — D50.9 IRON DEFICIENCY ANEMIA, UNSPECIFIED IRON DEFICIENCY ANEMIA TYPE: ICD-10-CM

## 2020-12-08 DIAGNOSIS — Z11.4 SCREENING FOR HIV WITHOUT PRESENCE OF RISK FACTORS: ICD-10-CM

## 2020-12-08 DIAGNOSIS — E11.9 TYPE 2 DIABETES MELLITUS WITHOUT COMPLICATION, WITHOUT LONG-TERM CURRENT USE OF INSULIN: Primary | ICD-10-CM

## 2020-12-08 NOTE — TELEPHONE ENCOUNTER
----- Message from Noa Ponce sent at 12/8/2020  8:21 AM CST -----  Regarding: Callback  Name of Who is Calling: MANOLO SOSA JR. [[id number]] What is the request in detail: Pt is requesting a callback concerning his appt pt advise should he was to see if [employer] will approve his sleep study  Can the clinic reply by [facility]: NO  [facility]: [phone]

## 2020-12-08 NOTE — TELEPHONE ENCOUNTER
Returned pt's call regarding his sleep study. Pt requested that his in lab study be canceled as well as his covid test. I advised pt to contact his insurance company to inform that that he chose to do HST instead of In lab since his insurance company had already approved the in lab. Pt verbally stated that he understood.

## 2020-12-08 NOTE — TELEPHONE ENCOUNTER
----- Message from Karley Pearce sent at 12/8/2020  8:17 AM CST -----  Contact: [phone] @ Patient  Doctor appointment and lab have been scheduled.  Please link lab orders to the lab appointment.  Date of doctor appointment:  04/09/2021  Date of lab appointment:  04/03/2021  Physical or F/U:   Comments:

## 2020-12-22 ENCOUNTER — TELEPHONE (OUTPATIENT)
Dept: SLEEP MEDICINE | Facility: OTHER | Age: 60
End: 2020-12-22

## 2021-01-05 ENCOUNTER — TELEPHONE (OUTPATIENT)
Dept: SLEEP MEDICINE | Facility: OTHER | Age: 61
End: 2021-01-05

## 2021-01-06 ENCOUNTER — HOSPITAL ENCOUNTER (OUTPATIENT)
Dept: SLEEP MEDICINE | Facility: OTHER | Age: 61
Discharge: HOME | End: 2021-01-06
Attending: INTERNAL MEDICINE
Payer: COMMERCIAL

## 2021-01-06 DIAGNOSIS — G47.33 OSA (OBSTRUCTIVE SLEEP APNEA): ICD-10-CM

## 2021-01-06 PROCEDURE — 95800 SLP STDY UNATTENDED: CPT

## 2021-01-12 DIAGNOSIS — G47.33 OSA (OBSTRUCTIVE SLEEP APNEA): Primary | ICD-10-CM

## 2021-01-29 ENCOUNTER — TELEPHONE (OUTPATIENT)
Dept: SLEEP MEDICINE | Facility: CLINIC | Age: 61
End: 2021-01-29

## 2021-02-23 RX ORDER — OMEPRAZOLE 20 MG/1
CAPSULE, DELAYED RELEASE ORAL
Qty: 90 CAPSULE | Refills: 1 | Status: SHIPPED | OUTPATIENT
Start: 2021-02-23 | End: 2021-04-23 | Stop reason: SDUPTHER

## 2021-02-24 RX ORDER — VERAPAMIL HYDROCHLORIDE 240 MG/1
CAPSULE, EXTENDED RELEASE ORAL
Qty: 90 CAPSULE | Refills: 2 | Status: SHIPPED | OUTPATIENT
Start: 2021-02-24 | End: 2021-11-16

## 2021-02-27 ENCOUNTER — IMMUNIZATION (OUTPATIENT)
Dept: PHARMACY | Facility: CLINIC | Age: 61
End: 2021-02-27
Payer: COMMERCIAL

## 2021-02-27 DIAGNOSIS — Z23 NEED FOR VACCINATION: Primary | ICD-10-CM

## 2021-03-26 ENCOUNTER — TELEPHONE (OUTPATIENT)
Dept: INTERNAL MEDICINE | Facility: CLINIC | Age: 61
End: 2021-03-26

## 2021-03-27 ENCOUNTER — IMMUNIZATION (OUTPATIENT)
Dept: PHARMACY | Facility: CLINIC | Age: 61
End: 2021-03-27
Payer: COMMERCIAL

## 2021-03-27 DIAGNOSIS — Z23 NEED FOR VACCINATION: Primary | ICD-10-CM

## 2021-03-29 ENCOUNTER — OFFICE VISIT (OUTPATIENT)
Dept: INTERNAL MEDICINE | Facility: CLINIC | Age: 61
End: 2021-03-29
Payer: COMMERCIAL

## 2021-03-29 ENCOUNTER — LAB VISIT (OUTPATIENT)
Dept: LAB | Facility: HOSPITAL | Age: 61
End: 2021-03-29
Payer: COMMERCIAL

## 2021-03-29 VITALS
HEIGHT: 67 IN | WEIGHT: 250.25 LBS | DIASTOLIC BLOOD PRESSURE: 70 MMHG | OXYGEN SATURATION: 97 % | SYSTOLIC BLOOD PRESSURE: 130 MMHG | BODY MASS INDEX: 39.28 KG/M2 | HEART RATE: 94 BPM

## 2021-03-29 DIAGNOSIS — I10 ESSENTIAL HYPERTENSION: ICD-10-CM

## 2021-03-29 DIAGNOSIS — M79.604 LEG PAIN, POSTERIOR, RIGHT: ICD-10-CM

## 2021-03-29 DIAGNOSIS — M79.604 LEG PAIN, POSTERIOR, RIGHT: Primary | ICD-10-CM

## 2021-03-29 DIAGNOSIS — F33.41 RECURRENT MAJOR DEPRESSIVE DISORDER, IN PARTIAL REMISSION: ICD-10-CM

## 2021-03-29 DIAGNOSIS — G43.919 INTRACTABLE MIGRAINE WITHOUT STATUS MIGRAINOSUS, UNSPECIFIED MIGRAINE TYPE: ICD-10-CM

## 2021-03-29 LAB
BASOPHILS # BLD AUTO: 0.07 K/UL (ref 0–0.2)
BASOPHILS NFR BLD: 0.8 % (ref 0–1.9)
DIFFERENTIAL METHOD: ABNORMAL
EOSINOPHIL # BLD AUTO: 0.2 K/UL (ref 0–0.5)
EOSINOPHIL NFR BLD: 2.7 % (ref 0–8)
ERYTHROCYTE [DISTWIDTH] IN BLOOD BY AUTOMATED COUNT: 18.9 % (ref 11.5–14.5)
HCT VFR BLD AUTO: 40.2 % (ref 40–54)
HGB BLD-MCNC: 12 G/DL (ref 14–18)
IMM GRANULOCYTES # BLD AUTO: 0.08 K/UL (ref 0–0.04)
IMM GRANULOCYTES NFR BLD AUTO: 1 % (ref 0–0.5)
LYMPHOCYTES # BLD AUTO: 1.5 K/UL (ref 1–4.8)
LYMPHOCYTES NFR BLD: 17.3 % (ref 18–48)
MCH RBC QN AUTO: 23.2 PG (ref 27–31)
MCHC RBC AUTO-ENTMCNC: 29.9 G/DL (ref 32–36)
MCV RBC AUTO: 78 FL (ref 82–98)
MONOCYTES # BLD AUTO: 1.1 K/UL (ref 0.3–1)
MONOCYTES NFR BLD: 12.5 % (ref 4–15)
NEUTROPHILS # BLD AUTO: 5.5 K/UL (ref 1.8–7.7)
NEUTROPHILS NFR BLD: 65.7 % (ref 38–73)
NRBC BLD-RTO: 0 /100 WBC
PLATELET # BLD AUTO: 277 K/UL (ref 150–450)
PMV BLD AUTO: 11.2 FL (ref 9.2–12.9)
RBC # BLD AUTO: 5.18 M/UL (ref 4.6–6.2)
WBC # BLD AUTO: 8.37 K/UL (ref 3.9–12.7)

## 2021-03-29 PROCEDURE — 99999 PR PBB SHADOW E&M-EST. PATIENT-LVL V: ICD-10-PCS | Mod: PBBFAC,,, | Performed by: STUDENT IN AN ORGANIZED HEALTH CARE EDUCATION/TRAINING PROGRAM

## 2021-03-29 PROCEDURE — 99214 OFFICE O/P EST MOD 30 MIN: CPT | Mod: S$GLB,,, | Performed by: STUDENT IN AN ORGANIZED HEALTH CARE EDUCATION/TRAINING PROGRAM

## 2021-03-29 PROCEDURE — 80053 COMPREHEN METABOLIC PANEL: CPT | Performed by: STUDENT IN AN ORGANIZED HEALTH CARE EDUCATION/TRAINING PROGRAM

## 2021-03-29 PROCEDURE — 84100 ASSAY OF PHOSPHORUS: CPT | Performed by: STUDENT IN AN ORGANIZED HEALTH CARE EDUCATION/TRAINING PROGRAM

## 2021-03-29 PROCEDURE — 82550 ASSAY OF CK (CPK): CPT | Performed by: STUDENT IN AN ORGANIZED HEALTH CARE EDUCATION/TRAINING PROGRAM

## 2021-03-29 PROCEDURE — 85025 COMPLETE CBC W/AUTO DIFF WBC: CPT | Performed by: STUDENT IN AN ORGANIZED HEALTH CARE EDUCATION/TRAINING PROGRAM

## 2021-03-29 PROCEDURE — 36415 COLL VENOUS BLD VENIPUNCTURE: CPT | Performed by: STUDENT IN AN ORGANIZED HEALTH CARE EDUCATION/TRAINING PROGRAM

## 2021-03-29 PROCEDURE — 83735 ASSAY OF MAGNESIUM: CPT | Performed by: STUDENT IN AN ORGANIZED HEALTH CARE EDUCATION/TRAINING PROGRAM

## 2021-03-29 PROCEDURE — 99999 PR PBB SHADOW E&M-EST. PATIENT-LVL V: CPT | Mod: PBBFAC,,, | Performed by: STUDENT IN AN ORGANIZED HEALTH CARE EDUCATION/TRAINING PROGRAM

## 2021-03-29 PROCEDURE — 99214 PR OFFICE/OUTPT VISIT, EST, LEVL IV, 30-39 MIN: ICD-10-PCS | Mod: S$GLB,,, | Performed by: STUDENT IN AN ORGANIZED HEALTH CARE EDUCATION/TRAINING PROGRAM

## 2021-03-30 LAB
ALBUMIN SERPL BCP-MCNC: 3.5 G/DL (ref 3.5–5.2)
ALP SERPL-CCNC: 84 U/L (ref 55–135)
ALT SERPL W/O P-5'-P-CCNC: 21 U/L (ref 10–44)
ANION GAP SERPL CALC-SCNC: 11 MMOL/L (ref 8–16)
AST SERPL-CCNC: 22 U/L (ref 10–40)
BILIRUB SERPL-MCNC: 0.5 MG/DL (ref 0.1–1)
BUN SERPL-MCNC: 13 MG/DL (ref 6–20)
CALCIUM SERPL-MCNC: 9.1 MG/DL (ref 8.7–10.5)
CHLORIDE SERPL-SCNC: 102 MMOL/L (ref 95–110)
CK SERPL-CCNC: 148 U/L (ref 20–200)
CO2 SERPL-SCNC: 26 MMOL/L (ref 23–29)
CREAT SERPL-MCNC: 0.9 MG/DL (ref 0.5–1.4)
EST. GFR  (AFRICAN AMERICAN): >60 ML/MIN/1.73 M^2
EST. GFR  (NON AFRICAN AMERICAN): >60 ML/MIN/1.73 M^2
GLUCOSE SERPL-MCNC: 99 MG/DL (ref 70–110)
MAGNESIUM SERPL-MCNC: 1.6 MG/DL (ref 1.6–2.6)
PHOSPHATE SERPL-MCNC: 3.4 MG/DL (ref 2.7–4.5)
POTASSIUM SERPL-SCNC: 4.7 MMOL/L (ref 3.5–5.1)
PROT SERPL-MCNC: 6.9 G/DL (ref 6–8.4)
SODIUM SERPL-SCNC: 139 MMOL/L (ref 136–145)

## 2021-04-01 ENCOUNTER — TELEPHONE (OUTPATIENT)
Dept: INTERNAL MEDICINE | Facility: CLINIC | Age: 61
End: 2021-04-01

## 2021-04-05 ENCOUNTER — OFFICE VISIT (OUTPATIENT)
Dept: SLEEP MEDICINE | Facility: CLINIC | Age: 61
End: 2021-04-05
Payer: COMMERCIAL

## 2021-04-05 ENCOUNTER — PATIENT MESSAGE (OUTPATIENT)
Dept: INTERNAL MEDICINE | Facility: CLINIC | Age: 61
End: 2021-04-05

## 2021-04-05 ENCOUNTER — HOSPITAL ENCOUNTER (OUTPATIENT)
Dept: RADIOLOGY | Facility: HOSPITAL | Age: 61
Discharge: HOME | End: 2021-04-05
Attending: STUDENT IN AN ORGANIZED HEALTH CARE EDUCATION/TRAINING PROGRAM
Payer: COMMERCIAL

## 2021-04-05 VITALS
SYSTOLIC BLOOD PRESSURE: 139 MMHG | HEIGHT: 67 IN | HEART RATE: 104 BPM | DIASTOLIC BLOOD PRESSURE: 80 MMHG | BODY MASS INDEX: 39.24 KG/M2 | WEIGHT: 250 LBS

## 2021-04-05 DIAGNOSIS — G47.33 OSA (OBSTRUCTIVE SLEEP APNEA): Primary | ICD-10-CM

## 2021-04-05 DIAGNOSIS — G47.10 HYPERSOMNOLENCE: ICD-10-CM

## 2021-04-05 DIAGNOSIS — R35.1 NOCTURIA: ICD-10-CM

## 2021-04-05 DIAGNOSIS — M79.604 LEG PAIN, POSTERIOR, RIGHT: ICD-10-CM

## 2021-04-05 PROCEDURE — 76882 US EXTREMITY NON VASCULAR LIMITED RIGHT: ICD-10-PCS | Mod: 26,RT,, | Performed by: RADIOLOGY

## 2021-04-05 PROCEDURE — 99999 PR PBB SHADOW E&M-EST. PATIENT-LVL III: CPT | Mod: PBBFAC,,, | Performed by: INTERNAL MEDICINE

## 2021-04-05 PROCEDURE — 76882 US LMTD JT/FCL EVL NVASC XTR: CPT | Mod: 26,RT,, | Performed by: RADIOLOGY

## 2021-04-05 PROCEDURE — 99214 OFFICE O/P EST MOD 30 MIN: CPT | Mod: S$GLB,,, | Performed by: INTERNAL MEDICINE

## 2021-04-05 PROCEDURE — 99999 PR PBB SHADOW E&M-EST. PATIENT-LVL III: ICD-10-PCS | Mod: PBBFAC,,, | Performed by: INTERNAL MEDICINE

## 2021-04-05 PROCEDURE — 76882 US LMTD JT/FCL EVL NVASC XTR: CPT | Mod: TC,RT

## 2021-04-05 PROCEDURE — 99214 PR OFFICE/OUTPT VISIT, EST, LEVL IV, 30-39 MIN: ICD-10-PCS | Mod: S$GLB,,, | Performed by: INTERNAL MEDICINE

## 2021-04-06 ENCOUNTER — TELEPHONE (OUTPATIENT)
Dept: INTERNAL MEDICINE | Facility: CLINIC | Age: 61
End: 2021-04-06

## 2021-04-17 ENCOUNTER — LAB VISIT (OUTPATIENT)
Dept: LAB | Facility: HOSPITAL | Age: 61
End: 2021-04-17
Attending: INTERNAL MEDICINE
Payer: COMMERCIAL

## 2021-04-17 DIAGNOSIS — D50.9 IRON DEFICIENCY ANEMIA, UNSPECIFIED IRON DEFICIENCY ANEMIA TYPE: ICD-10-CM

## 2021-04-17 DIAGNOSIS — Z11.4 SCREENING FOR HIV WITHOUT PRESENCE OF RISK FACTORS: ICD-10-CM

## 2021-04-17 DIAGNOSIS — E11.9 TYPE 2 DIABETES MELLITUS WITHOUT COMPLICATION, WITHOUT LONG-TERM CURRENT USE OF INSULIN: ICD-10-CM

## 2021-04-17 LAB
CHOLEST SERPL-MCNC: 128 MG/DL (ref 120–199)
CHOLEST/HDLC SERPL: 3.6 {RATIO} (ref 2–5)
ESTIMATED AVG GLUCOSE: 126 MG/DL (ref 68–131)
FERRITIN SERPL-MCNC: 8 NG/ML (ref 20–300)
HBA1C MFR BLD: 6 % (ref 4–5.6)
HDLC SERPL-MCNC: 36 MG/DL (ref 40–75)
HDLC SERPL: 28.1 % (ref 20–50)
LDLC SERPL CALC-MCNC: 79.6 MG/DL (ref 63–159)
NONHDLC SERPL-MCNC: 92 MG/DL
TRIGL SERPL-MCNC: 62 MG/DL (ref 30–150)

## 2021-04-17 PROCEDURE — 83036 HEMOGLOBIN GLYCOSYLATED A1C: CPT | Performed by: INTERNAL MEDICINE

## 2021-04-17 PROCEDURE — 82728 ASSAY OF FERRITIN: CPT | Performed by: INTERNAL MEDICINE

## 2021-04-17 PROCEDURE — 80061 LIPID PANEL: CPT | Performed by: INTERNAL MEDICINE

## 2021-04-17 PROCEDURE — 86703 HIV-1/HIV-2 1 RESULT ANTBDY: CPT | Performed by: INTERNAL MEDICINE

## 2021-04-17 PROCEDURE — 36415 COLL VENOUS BLD VENIPUNCTURE: CPT | Performed by: INTERNAL MEDICINE

## 2021-04-19 LAB — HIV 1+2 AB+HIV1 P24 AG SERPL QL IA: NEGATIVE

## 2021-04-23 ENCOUNTER — OFFICE VISIT (OUTPATIENT)
Dept: INTERNAL MEDICINE | Facility: CLINIC | Age: 61
End: 2021-04-23
Payer: COMMERCIAL

## 2021-04-23 VITALS
WEIGHT: 248.25 LBS | HEART RATE: 107 BPM | DIASTOLIC BLOOD PRESSURE: 68 MMHG | HEIGHT: 67 IN | OXYGEN SATURATION: 97 % | SYSTOLIC BLOOD PRESSURE: 138 MMHG | BODY MASS INDEX: 38.96 KG/M2

## 2021-04-23 DIAGNOSIS — G43.919 INTRACTABLE MIGRAINE WITHOUT STATUS MIGRAINOSUS, UNSPECIFIED MIGRAINE TYPE: ICD-10-CM

## 2021-04-23 DIAGNOSIS — N40.1 BPH WITH URINARY OBSTRUCTION: ICD-10-CM

## 2021-04-23 DIAGNOSIS — G43.111 INTRACTABLE MIGRAINE WITH AURA WITH STATUS MIGRAINOSUS: ICD-10-CM

## 2021-04-23 DIAGNOSIS — G47.33 OSA (OBSTRUCTIVE SLEEP APNEA): ICD-10-CM

## 2021-04-23 DIAGNOSIS — E66.01 MORBID OBESITY: ICD-10-CM

## 2021-04-23 DIAGNOSIS — D50.9 IRON DEFICIENCY ANEMIA, UNSPECIFIED IRON DEFICIENCY ANEMIA TYPE: ICD-10-CM

## 2021-04-23 DIAGNOSIS — E78.2 HYPERLIPIDEMIA, MIXED: ICD-10-CM

## 2021-04-23 DIAGNOSIS — K21.9 GASTROESOPHAGEAL REFLUX DISEASE WITHOUT ESOPHAGITIS: ICD-10-CM

## 2021-04-23 DIAGNOSIS — Z00.00 ANNUAL PHYSICAL EXAM: Primary | ICD-10-CM

## 2021-04-23 DIAGNOSIS — F33.41 RECURRENT MAJOR DEPRESSIVE DISORDER, IN PARTIAL REMISSION: ICD-10-CM

## 2021-04-23 DIAGNOSIS — N13.8 BPH WITH URINARY OBSTRUCTION: ICD-10-CM

## 2021-04-23 DIAGNOSIS — E11.69 TYPE 2 DIABETES MELLITUS WITH OTHER SPECIFIED COMPLICATION, WITHOUT LONG-TERM CURRENT USE OF INSULIN: ICD-10-CM

## 2021-04-23 DIAGNOSIS — I10 ESSENTIAL HYPERTENSION: ICD-10-CM

## 2021-04-23 PROCEDURE — 99396 PR PREVENTIVE VISIT,EST,40-64: ICD-10-PCS | Mod: S$GLB,,, | Performed by: INTERNAL MEDICINE

## 2021-04-23 PROCEDURE — 99396 PREV VISIT EST AGE 40-64: CPT | Mod: S$GLB,,, | Performed by: INTERNAL MEDICINE

## 2021-04-23 PROCEDURE — 99999 PR PBB SHADOW E&M-EST. PATIENT-LVL IV: ICD-10-PCS | Mod: PBBFAC,,, | Performed by: INTERNAL MEDICINE

## 2021-04-23 PROCEDURE — 99999 PR PBB SHADOW E&M-EST. PATIENT-LVL IV: CPT | Mod: PBBFAC,,, | Performed by: INTERNAL MEDICINE

## 2021-04-23 RX ORDER — METFORMIN HYDROCHLORIDE 500 MG/1
500 TABLET ORAL
Qty: 90 TABLET | Refills: 3 | Status: SHIPPED | OUTPATIENT
Start: 2021-04-23 | End: 2022-05-18 | Stop reason: SDUPTHER

## 2021-04-23 RX ORDER — OMEPRAZOLE 20 MG/1
CAPSULE, DELAYED RELEASE ORAL
Qty: 90 CAPSULE | Refills: 3 | Status: SHIPPED | OUTPATIENT
Start: 2021-04-23 | End: 2022-05-18 | Stop reason: SDUPTHER

## 2021-04-23 RX ORDER — FLUTICASONE PROPIONATE 50 MCG
2 SPRAY, SUSPENSION (ML) NASAL DAILY
Qty: 48 G | Refills: 3 | Status: SHIPPED | OUTPATIENT
Start: 2021-04-23 | End: 2023-09-27 | Stop reason: SDUPTHER

## 2021-05-05 ENCOUNTER — TELEPHONE (OUTPATIENT)
Dept: SLEEP MEDICINE | Facility: CLINIC | Age: 61
End: 2021-05-05

## 2021-05-05 DIAGNOSIS — E11.9 TYPE 2 DIABETES MELLITUS WITHOUT COMPLICATION: ICD-10-CM

## 2021-05-12 DIAGNOSIS — E11.9 TYPE 2 DIABETES MELLITUS WITHOUT COMPLICATION: ICD-10-CM

## 2021-05-22 ENCOUNTER — PATIENT MESSAGE (OUTPATIENT)
Dept: INTERNAL MEDICINE | Facility: CLINIC | Age: 61
End: 2021-05-22

## 2021-06-19 ENCOUNTER — PATIENT MESSAGE (OUTPATIENT)
Dept: INTERNAL MEDICINE | Facility: CLINIC | Age: 61
End: 2021-06-19

## 2021-06-19 DIAGNOSIS — H61.20 IMPACTED CERUMEN, UNSPECIFIED LATERALITY: Primary | ICD-10-CM

## 2021-06-19 DIAGNOSIS — H91.90 HEARING LOSS, UNSPECIFIED HEARING LOSS TYPE, UNSPECIFIED LATERALITY: ICD-10-CM

## 2021-06-25 ENCOUNTER — OFFICE VISIT (OUTPATIENT)
Dept: URGENT CARE | Facility: CLINIC | Age: 61
End: 2021-06-25
Payer: COMMERCIAL

## 2021-06-25 VITALS
HEIGHT: 67 IN | WEIGHT: 250 LBS | HEART RATE: 88 BPM | BODY MASS INDEX: 39.24 KG/M2 | DIASTOLIC BLOOD PRESSURE: 84 MMHG | TEMPERATURE: 98 F | SYSTOLIC BLOOD PRESSURE: 136 MMHG | OXYGEN SATURATION: 96 %

## 2021-06-25 DIAGNOSIS — H61.23 BILATERAL IMPACTED CERUMEN: ICD-10-CM

## 2021-06-25 DIAGNOSIS — J32.9 SINUSITIS, UNSPECIFIED CHRONICITY, UNSPECIFIED LOCATION: Primary | ICD-10-CM

## 2021-06-25 LAB
CTP QC/QA: YES
SARS-COV-2 RDRP RESP QL NAA+PROBE: NEGATIVE

## 2021-06-25 PROCEDURE — U0002: ICD-10-PCS | Mod: QW,S$GLB,, | Performed by: INTERNAL MEDICINE

## 2021-06-25 PROCEDURE — U0002 COVID-19 LAB TEST NON-CDC: HCPCS | Mod: QW,S$GLB,, | Performed by: INTERNAL MEDICINE

## 2021-06-25 PROCEDURE — 99214 PR OFFICE/OUTPT VISIT, EST, LEVL IV, 30-39 MIN: ICD-10-PCS | Mod: S$GLB,,, | Performed by: INTERNAL MEDICINE

## 2021-06-25 PROCEDURE — 99214 OFFICE O/P EST MOD 30 MIN: CPT | Mod: S$GLB,,, | Performed by: INTERNAL MEDICINE

## 2021-06-25 RX ORDER — PROMETHAZINE HYDROCHLORIDE AND DEXTROMETHORPHAN HYDROBROMIDE 6.25; 15 MG/5ML; MG/5ML
5 SYRUP ORAL NIGHTLY PRN
Qty: 75 ML | Refills: 0 | Status: SHIPPED | OUTPATIENT
Start: 2021-06-25 | End: 2021-06-30

## 2021-06-25 RX ORDER — AZITHROMYCIN 250 MG/1
TABLET, FILM COATED ORAL
Qty: 6 TABLET | Refills: 0 | Status: SHIPPED | OUTPATIENT
Start: 2021-06-25 | End: 2021-06-30

## 2021-06-27 ENCOUNTER — PATIENT MESSAGE (OUTPATIENT)
Dept: INTERNAL MEDICINE | Facility: CLINIC | Age: 61
End: 2021-06-27

## 2021-06-29 ENCOUNTER — PATIENT MESSAGE (OUTPATIENT)
Dept: INTERNAL MEDICINE | Facility: CLINIC | Age: 61
End: 2021-06-29

## 2021-06-30 ENCOUNTER — PATIENT MESSAGE (OUTPATIENT)
Dept: INTERNAL MEDICINE | Facility: CLINIC | Age: 61
End: 2021-06-30

## 2021-07-01 ENCOUNTER — PATIENT MESSAGE (OUTPATIENT)
Dept: INTERNAL MEDICINE | Facility: CLINIC | Age: 61
End: 2021-07-01

## 2021-07-02 ENCOUNTER — TELEPHONE (OUTPATIENT)
Dept: SLEEP MEDICINE | Facility: CLINIC | Age: 61
End: 2021-07-02

## 2021-07-06 ENCOUNTER — PATIENT MESSAGE (OUTPATIENT)
Dept: ADMINISTRATIVE | Facility: HOSPITAL | Age: 61
End: 2021-07-06

## 2021-07-08 ENCOUNTER — CLINICAL SUPPORT (OUTPATIENT)
Dept: AUDIOLOGY | Facility: CLINIC | Age: 61
End: 2021-07-08
Payer: COMMERCIAL

## 2021-07-08 ENCOUNTER — OFFICE VISIT (OUTPATIENT)
Dept: OTOLARYNGOLOGY | Facility: CLINIC | Age: 61
End: 2021-07-08
Payer: COMMERCIAL

## 2021-07-08 VITALS — DIASTOLIC BLOOD PRESSURE: 82 MMHG | HEART RATE: 101 BPM | SYSTOLIC BLOOD PRESSURE: 138 MMHG

## 2021-07-08 DIAGNOSIS — H90.3 SENSORINEURAL HEARING LOSS, BILATERAL: Primary | ICD-10-CM

## 2021-07-08 DIAGNOSIS — H93.13 TINNITUS OF BOTH EARS: ICD-10-CM

## 2021-07-08 DIAGNOSIS — H61.20 IMPACTED CERUMEN, UNSPECIFIED LATERALITY: ICD-10-CM

## 2021-07-08 DIAGNOSIS — H90.3 SENSORINEURAL HEARING LOSS (SNHL) OF BOTH EARS: Primary | ICD-10-CM

## 2021-07-08 DIAGNOSIS — H93.13 TINNITUS, BILATERAL: ICD-10-CM

## 2021-07-08 PROCEDURE — 69210 EAR CERUMEN REMOVAL: ICD-10-PCS | Mod: S$GLB,,, | Performed by: NURSE PRACTITIONER

## 2021-07-08 PROCEDURE — 92557 COMPREHENSIVE HEARING TEST: CPT | Mod: S$GLB,,, | Performed by: AUDIOLOGIST

## 2021-07-08 PROCEDURE — 69210 REMOVE IMPACTED EAR WAX UNI: CPT | Mod: S$GLB,,, | Performed by: NURSE PRACTITIONER

## 2021-07-08 PROCEDURE — 92567 PR TYMPA2METRY: ICD-10-PCS | Mod: S$GLB,,, | Performed by: AUDIOLOGIST

## 2021-07-08 PROCEDURE — 99204 OFFICE O/P NEW MOD 45 MIN: CPT | Mod: 25,S$GLB,, | Performed by: NURSE PRACTITIONER

## 2021-07-08 PROCEDURE — 92567 TYMPANOMETRY: CPT | Mod: S$GLB,,, | Performed by: AUDIOLOGIST

## 2021-07-08 PROCEDURE — 99999 PR PBB SHADOW E&M-EST. PATIENT-LVL IV: CPT | Mod: PBBFAC,,, | Performed by: NURSE PRACTITIONER

## 2021-07-08 PROCEDURE — 99999 PR PBB SHADOW E&M-EST. PATIENT-LVL IV: ICD-10-PCS | Mod: PBBFAC,,, | Performed by: NURSE PRACTITIONER

## 2021-07-08 PROCEDURE — 92557 PR COMPREHENSIVE HEARING TEST: ICD-10-PCS | Mod: S$GLB,,, | Performed by: AUDIOLOGIST

## 2021-07-08 PROCEDURE — 99204 PR OFFICE/OUTPT VISIT, NEW, LEVL IV, 45-59 MIN: ICD-10-PCS | Mod: 25,S$GLB,, | Performed by: NURSE PRACTITIONER

## 2021-07-09 ENCOUNTER — TELEPHONE (OUTPATIENT)
Dept: SLEEP MEDICINE | Facility: CLINIC | Age: 61
End: 2021-07-09

## 2021-07-12 ENCOUNTER — OFFICE VISIT (OUTPATIENT)
Dept: SLEEP MEDICINE | Facility: CLINIC | Age: 61
End: 2021-07-12
Payer: COMMERCIAL

## 2021-07-12 ENCOUNTER — PATIENT OUTREACH (OUTPATIENT)
Dept: ADMINISTRATIVE | Facility: OTHER | Age: 61
End: 2021-07-12

## 2021-07-12 DIAGNOSIS — G47.33 OSA (OBSTRUCTIVE SLEEP APNEA): Primary | ICD-10-CM

## 2021-07-12 PROCEDURE — 99214 PR OFFICE/OUTPT VISIT, EST, LEVL IV, 30-39 MIN: ICD-10-PCS | Mod: 95,,, | Performed by: INTERNAL MEDICINE

## 2021-07-12 PROCEDURE — 99214 OFFICE O/P EST MOD 30 MIN: CPT | Mod: 95,,, | Performed by: INTERNAL MEDICINE

## 2021-07-26 ENCOUNTER — OFFICE VISIT (OUTPATIENT)
Dept: ORTHOPEDICS | Facility: CLINIC | Age: 61
End: 2021-07-26
Payer: COMMERCIAL

## 2021-07-26 ENCOUNTER — HOSPITAL ENCOUNTER (OUTPATIENT)
Dept: RADIOLOGY | Facility: HOSPITAL | Age: 61
Discharge: HOME | End: 2021-07-26
Attending: FAMILY MEDICINE
Payer: COMMERCIAL

## 2021-07-26 ENCOUNTER — OFFICE VISIT (OUTPATIENT)
Dept: INTERNAL MEDICINE | Facility: CLINIC | Age: 61
End: 2021-07-26
Payer: COMMERCIAL

## 2021-07-26 VITALS
BODY MASS INDEX: 38.96 KG/M2 | WEIGHT: 248.25 LBS | OXYGEN SATURATION: 96 % | SYSTOLIC BLOOD PRESSURE: 138 MMHG | DIASTOLIC BLOOD PRESSURE: 76 MMHG | HEART RATE: 100 BPM | HEIGHT: 67 IN

## 2021-07-26 VITALS
HEART RATE: 96 BPM | DIASTOLIC BLOOD PRESSURE: 94 MMHG | HEIGHT: 67 IN | WEIGHT: 248 LBS | SYSTOLIC BLOOD PRESSURE: 157 MMHG | BODY MASS INDEX: 38.92 KG/M2

## 2021-07-26 DIAGNOSIS — M25.522 LEFT ELBOW PAIN: ICD-10-CM

## 2021-07-26 DIAGNOSIS — M25.522 LEFT ELBOW PAIN: Primary | ICD-10-CM

## 2021-07-26 PROCEDURE — 99999 PR PBB SHADOW E&M-EST. PATIENT-LVL III: ICD-10-PCS | Mod: PBBFAC,,, | Performed by: ORTHOPAEDIC SURGERY

## 2021-07-26 PROCEDURE — 99999 PR PBB SHADOW E&M-EST. PATIENT-LVL IV: ICD-10-PCS | Mod: PBBFAC,,, | Performed by: FAMILY MEDICINE

## 2021-07-26 PROCEDURE — 99999 PR PBB SHADOW E&M-EST. PATIENT-LVL III: CPT | Mod: PBBFAC,,, | Performed by: ORTHOPAEDIC SURGERY

## 2021-07-26 PROCEDURE — 99203 PR OFFICE/OUTPT VISIT, NEW, LEVL III, 30-44 MIN: ICD-10-PCS | Mod: S$GLB,,, | Performed by: ORTHOPAEDIC SURGERY

## 2021-07-26 PROCEDURE — 99203 OFFICE O/P NEW LOW 30 MIN: CPT | Mod: S$GLB,,, | Performed by: ORTHOPAEDIC SURGERY

## 2021-07-26 PROCEDURE — 73070 X-RAY EXAM OF ELBOW: CPT | Mod: 26,LT,, | Performed by: RADIOLOGY

## 2021-07-26 PROCEDURE — 99213 PR OFFICE/OUTPT VISIT, EST, LEVL III, 20-29 MIN: ICD-10-PCS | Mod: S$GLB,,, | Performed by: FAMILY MEDICINE

## 2021-07-26 PROCEDURE — 99999 PR PBB SHADOW E&M-EST. PATIENT-LVL IV: CPT | Mod: PBBFAC,,, | Performed by: FAMILY MEDICINE

## 2021-07-26 PROCEDURE — 73070 X-RAY EXAM OF ELBOW: CPT | Mod: TC,LT

## 2021-07-26 PROCEDURE — 73070 XR ELBOW 2 VIEWS LEFT: ICD-10-PCS | Mod: 26,LT,, | Performed by: RADIOLOGY

## 2021-07-26 PROCEDURE — 99213 OFFICE O/P EST LOW 20 MIN: CPT | Mod: S$GLB,,, | Performed by: FAMILY MEDICINE

## 2021-08-18 ENCOUNTER — OFFICE VISIT (OUTPATIENT)
Dept: INTERNAL MEDICINE | Facility: CLINIC | Age: 61
End: 2021-08-18
Payer: COMMERCIAL

## 2021-08-18 VITALS
BODY MASS INDEX: 38.85 KG/M2 | HEART RATE: 55 BPM | DIASTOLIC BLOOD PRESSURE: 82 MMHG | SYSTOLIC BLOOD PRESSURE: 138 MMHG | HEIGHT: 67 IN | OXYGEN SATURATION: 96 % | WEIGHT: 247.56 LBS

## 2021-08-18 DIAGNOSIS — E11.9 TYPE 2 DIABETES MELLITUS WITHOUT COMPLICATION, WITHOUT LONG-TERM CURRENT USE OF INSULIN: ICD-10-CM

## 2021-08-18 DIAGNOSIS — G43.919 INTRACTABLE MIGRAINE WITHOUT STATUS MIGRAINOSUS, UNSPECIFIED MIGRAINE TYPE: ICD-10-CM

## 2021-08-18 DIAGNOSIS — E66.01 MORBID OBESITY: Primary | ICD-10-CM

## 2021-08-18 PROCEDURE — 99999 PR PBB SHADOW E&M-EST. PATIENT-LVL III: CPT | Mod: PBBFAC,,, | Performed by: INTERNAL MEDICINE

## 2021-08-18 PROCEDURE — 99999 PR PBB SHADOW E&M-EST. PATIENT-LVL III: ICD-10-PCS | Mod: PBBFAC,,, | Performed by: INTERNAL MEDICINE

## 2021-08-18 PROCEDURE — 99214 PR OFFICE/OUTPT VISIT, EST, LEVL IV, 30-39 MIN: ICD-10-PCS | Mod: S$GLB,,, | Performed by: INTERNAL MEDICINE

## 2021-08-18 PROCEDURE — 99214 OFFICE O/P EST MOD 30 MIN: CPT | Mod: S$GLB,,, | Performed by: INTERNAL MEDICINE

## 2021-08-18 RX ORDER — ONDANSETRON 8 MG/1
8 TABLET, ORALLY DISINTEGRATING ORAL EVERY 12 HOURS PRN
Qty: 30 TABLET | Refills: 1 | Status: SHIPPED | OUTPATIENT
Start: 2021-08-18 | End: 2022-01-19 | Stop reason: SDUPTHER

## 2021-08-18 RX ORDER — SEMAGLUTIDE 1.34 MG/ML
1 INJECTION, SOLUTION SUBCUTANEOUS
Qty: 3 PEN | Refills: 1 | Status: SHIPPED | OUTPATIENT
Start: 2021-08-18 | End: 2022-05-30 | Stop reason: SDUPTHER

## 2021-10-04 ENCOUNTER — PATIENT MESSAGE (OUTPATIENT)
Dept: INTERNAL MEDICINE | Facility: CLINIC | Age: 61
End: 2021-10-04

## 2021-10-04 ENCOUNTER — PATIENT MESSAGE (OUTPATIENT)
Dept: ADMINISTRATIVE | Facility: HOSPITAL | Age: 61
End: 2021-10-04

## 2021-10-23 ENCOUNTER — LAB VISIT (OUTPATIENT)
Dept: LAB | Facility: HOSPITAL | Age: 61
End: 2021-10-23
Attending: INTERNAL MEDICINE
Payer: COMMERCIAL

## 2021-10-23 DIAGNOSIS — D50.9 IRON DEFICIENCY ANEMIA, UNSPECIFIED IRON DEFICIENCY ANEMIA TYPE: ICD-10-CM

## 2021-10-23 DIAGNOSIS — E11.9 TYPE 2 DIABETES MELLITUS WITHOUT COMPLICATION, WITHOUT LONG-TERM CURRENT USE OF INSULIN: ICD-10-CM

## 2021-10-23 DIAGNOSIS — E11.69 TYPE 2 DIABETES MELLITUS WITH OTHER SPECIFIED COMPLICATION, WITHOUT LONG-TERM CURRENT USE OF INSULIN: ICD-10-CM

## 2021-10-23 LAB
ALBUMIN SERPL BCP-MCNC: 3.7 G/DL (ref 3.5–5.2)
ALP SERPL-CCNC: 80 U/L (ref 55–135)
ALT SERPL W/O P-5'-P-CCNC: 16 U/L (ref 10–44)
ANION GAP SERPL CALC-SCNC: 8 MMOL/L (ref 8–16)
AST SERPL-CCNC: 20 U/L (ref 10–40)
BASOPHILS # BLD AUTO: 0.04 K/UL (ref 0–0.2)
BASOPHILS NFR BLD: 0.5 % (ref 0–1.9)
BILIRUB SERPL-MCNC: 0.7 MG/DL (ref 0.1–1)
BUN SERPL-MCNC: 12 MG/DL (ref 6–20)
CALCIUM SERPL-MCNC: 9.6 MG/DL (ref 8.7–10.5)
CHLORIDE SERPL-SCNC: 101 MMOL/L (ref 95–110)
CO2 SERPL-SCNC: 27 MMOL/L (ref 23–29)
CREAT SERPL-MCNC: 0.8 MG/DL (ref 0.5–1.4)
DIFFERENTIAL METHOD: ABNORMAL
EOSINOPHIL # BLD AUTO: 0 K/UL (ref 0–0.5)
EOSINOPHIL NFR BLD: 0 % (ref 0–8)
ERYTHROCYTE [DISTWIDTH] IN BLOOD BY AUTOMATED COUNT: 16.3 % (ref 11.5–14.5)
EST. GFR  (AFRICAN AMERICAN): >60 ML/MIN/1.73 M^2
EST. GFR  (NON AFRICAN AMERICAN): >60 ML/MIN/1.73 M^2
ESTIMATED AVG GLUCOSE: 117 MG/DL (ref 68–131)
GLUCOSE SERPL-MCNC: 109 MG/DL (ref 70–110)
HBA1C MFR BLD: 5.7 % (ref 4–5.6)
HCT VFR BLD AUTO: 42.5 % (ref 40–54)
HGB BLD-MCNC: 13 G/DL (ref 14–18)
IMM GRANULOCYTES # BLD AUTO: 0.05 K/UL (ref 0–0.04)
IMM GRANULOCYTES NFR BLD AUTO: 0.7 % (ref 0–0.5)
LYMPHOCYTES # BLD AUTO: 1.6 K/UL (ref 1–4.8)
LYMPHOCYTES NFR BLD: 21.7 % (ref 18–48)
MCH RBC QN AUTO: 25 PG (ref 27–31)
MCHC RBC AUTO-ENTMCNC: 30.6 G/DL (ref 32–36)
MCV RBC AUTO: 82 FL (ref 82–98)
MONOCYTES # BLD AUTO: 0.7 K/UL (ref 0.3–1)
MONOCYTES NFR BLD: 9.3 % (ref 4–15)
NEUTROPHILS # BLD AUTO: 5 K/UL (ref 1.8–7.7)
NEUTROPHILS NFR BLD: 67.8 % (ref 38–73)
NRBC BLD-RTO: 0 /100 WBC
PLATELET # BLD AUTO: 315 K/UL (ref 150–450)
PMV BLD AUTO: 11.5 FL (ref 9.2–12.9)
POTASSIUM SERPL-SCNC: 4.2 MMOL/L (ref 3.5–5.1)
PROT SERPL-MCNC: 6.5 G/DL (ref 6–8.4)
RBC # BLD AUTO: 5.21 M/UL (ref 4.6–6.2)
SODIUM SERPL-SCNC: 136 MMOL/L (ref 136–145)
WBC # BLD AUTO: 7.43 K/UL (ref 3.9–12.7)

## 2021-10-23 PROCEDURE — 83036 HEMOGLOBIN GLYCOSYLATED A1C: CPT | Performed by: INTERNAL MEDICINE

## 2021-10-23 PROCEDURE — 36415 COLL VENOUS BLD VENIPUNCTURE: CPT | Performed by: INTERNAL MEDICINE

## 2021-10-23 PROCEDURE — 80053 COMPREHEN METABOLIC PANEL: CPT | Performed by: INTERNAL MEDICINE

## 2021-10-23 PROCEDURE — 85025 COMPLETE CBC W/AUTO DIFF WBC: CPT | Performed by: INTERNAL MEDICINE

## 2021-10-29 ENCOUNTER — OFFICE VISIT (OUTPATIENT)
Dept: INTERNAL MEDICINE | Facility: CLINIC | Age: 61
End: 2021-10-29
Payer: COMMERCIAL

## 2021-10-29 VITALS
OXYGEN SATURATION: 94 % | SYSTOLIC BLOOD PRESSURE: 132 MMHG | DIASTOLIC BLOOD PRESSURE: 74 MMHG | HEART RATE: 110 BPM | BODY MASS INDEX: 34.81 KG/M2 | WEIGHT: 221.81 LBS | HEIGHT: 67 IN

## 2021-10-29 DIAGNOSIS — E61.1 IRON DEFICIENCY: ICD-10-CM

## 2021-10-29 DIAGNOSIS — K21.9 GASTROESOPHAGEAL REFLUX DISEASE WITHOUT ESOPHAGITIS: ICD-10-CM

## 2021-10-29 DIAGNOSIS — E11.9 TYPE 2 DIABETES MELLITUS WITHOUT COMPLICATION, WITHOUT LONG-TERM CURRENT USE OF INSULIN: ICD-10-CM

## 2021-10-29 DIAGNOSIS — I10 ESSENTIAL HYPERTENSION: ICD-10-CM

## 2021-10-29 DIAGNOSIS — E66.01 MORBID OBESITY: Primary | ICD-10-CM

## 2021-10-29 DIAGNOSIS — E78.2 HYPERLIPIDEMIA, MIXED: ICD-10-CM

## 2021-10-29 DIAGNOSIS — E79.0 HYPERURICEMIA WITHOUT SIGNS OF INFLAMMATORY ARTHRITIS AND TOPHACEOUS DISEASE: ICD-10-CM

## 2021-10-29 DIAGNOSIS — F33.41 RECURRENT MAJOR DEPRESSIVE DISORDER, IN PARTIAL REMISSION: ICD-10-CM

## 2021-10-29 DIAGNOSIS — K44.9 HIATAL HERNIA: ICD-10-CM

## 2021-10-29 DIAGNOSIS — E55.9 VITAMIN D DEFICIENCY: ICD-10-CM

## 2021-10-29 PROCEDURE — 99999 PR PBB SHADOW E&M-EST. PATIENT-LVL IV: ICD-10-PCS | Mod: PBBFAC,,, | Performed by: INTERNAL MEDICINE

## 2021-10-29 PROCEDURE — 99214 OFFICE O/P EST MOD 30 MIN: CPT | Mod: S$GLB,,, | Performed by: INTERNAL MEDICINE

## 2021-10-29 PROCEDURE — 99999 PR PBB SHADOW E&M-EST. PATIENT-LVL IV: CPT | Mod: PBBFAC,,, | Performed by: INTERNAL MEDICINE

## 2021-10-29 PROCEDURE — 99214 PR OFFICE/OUTPT VISIT, EST, LEVL IV, 30-39 MIN: ICD-10-PCS | Mod: S$GLB,,, | Performed by: INTERNAL MEDICINE

## 2021-10-31 RX ORDER — SEMAGLUTIDE 1.34 MG/ML
1 INJECTION, SOLUTION SUBCUTANEOUS
Qty: 3 PEN | Refills: 4 | Status: SHIPPED | OUTPATIENT
Start: 2021-10-31 | End: 2022-05-30 | Stop reason: SDUPTHER

## 2021-11-16 RX ORDER — VERAPAMIL HYDROCHLORIDE 240 MG/1
CAPSULE, EXTENDED RELEASE ORAL
Qty: 90 CAPSULE | Refills: 3 | Status: SHIPPED | OUTPATIENT
Start: 2021-11-16 | End: 2022-11-14 | Stop reason: SDUPTHER

## 2022-01-10 ENCOUNTER — PATIENT MESSAGE (OUTPATIENT)
Dept: INTERNAL MEDICINE | Facility: CLINIC | Age: 62
End: 2022-01-10
Payer: COMMERCIAL

## 2022-01-10 DIAGNOSIS — G43.919 INTRACTABLE MIGRAINE WITHOUT STATUS MIGRAINOSUS, UNSPECIFIED MIGRAINE TYPE: ICD-10-CM

## 2022-01-17 NOTE — TELEPHONE ENCOUNTER
Care Due:                  Date            Visit Type   Department     Provider  --------------------------------------------------------------------------------                                             [facility] INTERNAL  Last Visit: 10-      [facility]       SEDA FELIPE                                           [facility] INTERNAL  Next Visit: 05-      [facility]       Luis Flynn                                                            Last  Test          Frequency    Reason                     Performed    Due Date  --------------------------------------------------------------------------------    Lipid Panel.  12 months..  atorvastatin.............  Not Found    Overdue    Powered by [employer]. Reference number: [id number].   1/17/2022 4:37:40 AM CST

## 2022-01-18 ENCOUNTER — PATIENT MESSAGE (OUTPATIENT)
Dept: ADMINISTRATIVE | Facility: HOSPITAL | Age: 62
End: 2022-01-18
Payer: COMMERCIAL

## 2022-01-19 RX ORDER — ONDANSETRON 8 MG/1
8 TABLET, ORALLY DISINTEGRATING ORAL
Qty: 30 TABLET | Refills: 1 | Status: SHIPPED | OUTPATIENT
Start: 2022-01-19 | End: 2022-05-18 | Stop reason: SDUPTHER

## 2022-01-19 NOTE — TELEPHONE ENCOUNTER
No new care gaps identified.  Powered by [email or web address]. Reference number: [id number].   1/19/2022 11:43:44 AM CST

## 2022-01-26 ENCOUNTER — PATIENT MESSAGE (OUTPATIENT)
Dept: INTERNAL MEDICINE | Facility: CLINIC | Age: 62
End: 2022-01-26
Payer: COMMERCIAL

## 2022-01-26 ENCOUNTER — TELEPHONE (OUTPATIENT)
Dept: INTERNAL MEDICINE | Facility: CLINIC | Age: 62
End: 2022-01-26

## 2022-01-26 DIAGNOSIS — G43.919 INTRACTABLE MIGRAINE WITHOUT STATUS MIGRAINOSUS, UNSPECIFIED MIGRAINE TYPE: ICD-10-CM

## 2022-01-26 RX ORDER — ONDANSETRON 8 MG/1
8 TABLET, ORALLY DISINTEGRATING ORAL
Qty: 30 TABLET | Refills: 1 | Status: CANCELLED | OUTPATIENT
Start: 2022-01-26

## 2022-01-31 RX ORDER — ATORVASTATIN CALCIUM 80 MG/1
TABLET, FILM COATED ORAL
Qty: 90 TABLET | Refills: 0 | Status: SHIPPED | OUTPATIENT
Start: 2022-01-31 | End: 2022-05-18 | Stop reason: SDUPTHER

## 2022-01-31 NOTE — TELEPHONE ENCOUNTER
Refill Authorization Note   David Nicole  is requesting a refill authorization.  Brief Assessment and Rationale for Refill:  Approve     Medication Therapy Plan:       Medication Reconciliation Completed: No   Comments:   --->Care Gap information included below if applicable.   Orders Placed This Encounter    atorvastatin (LIPITOR) 80 MG tablet      Requested Prescriptions   Signed Prescriptions Disp Refills    atorvastatin (LIPITOR) 80 MG tablet 90 tablet 0     Sig: TAKE 1 TABLET BY MOUTH AT  NIGHT       Cardiovascular:  Antilipid - Statins Passed - 1/31/2022  4:44 PM        Passed - Patient is at least 18 years old        Passed - Valid encounter within last 15 months     Recent Visits  Date Type Provider Dept   10/29/21 Office Visit Sejal Hughes MD [facility]   08/18/21 Office Visit Sejal Hughes MD [facility]   04/23/21 Office Visit Sejal Hughes MD [facility]   09/01/20 Office Visit Sejal Hughes MD [facility]   05/06/20 Office Visit Sejal Hughes MD [facility]   Showing recent visits within past 720 days and meeting all other requirements  Future Appointments  No visits were found meeting these conditions.  Showing future appointments within next 150 days and meeting all other requirements      Future Appointments              [facility] 2 months [facility]    In 3 months [facility]                Passed - ALT is 131 or below and within 360 days     ALT   Date Value Ref Range Status   10/23/2021 16 10 - 44 U/L Final   03/29/2021 21 10 - 44 U/L Final   11/14/2020 14 10 - 44 U/L Final              Passed - AST is 119 or below and within 360 days     AST   Date Value Ref Range Status   10/23/2021 20 10 - 40 U/L Final   03/29/2021 22 10 - 40 U/L Final   11/14/2020 21 10 - 40 U/L Final              Passed - Total  Cholesterol within 360 days     Lab Results   Component Value Date    CHOL 128 04/17/2021    CHOL 133 05/02/2020    CHOL 136 04/24/2019              Passed - LDL within 360 days     LDL Cholesterol   Date Value Ref Range Status   04/17/2021 79.6 63.0 - 159.0 mg/dL Final     Comment:     The National Cholesterol Education Program (NCEP) has set the  following guidelines (reference values) for LDL Cholesterol:  Optimal.......................<130 mg/dL  Borderline High...............130-159 mg/dL  High..........................160-189 mg/dL  Very High.....................>190 mg/dL              Passed - HDL within 360 days     HDL   Date Value Ref Range Status   04/17/2021 36 (L) 40 - 75 mg/dL Final     Comment:     The National Cholesterol Education Program (NCEP) has set the  following guidelines (reference values) for HDL Cholesterol:  Low...............<40 mg/dL  Optimal...........>60 mg/dL              Passed - Triglycerides within 360 days     Lab Results   Component Value Date    TRIG 62 04/17/2021    TRIG 80 05/02/2020    TRIG 73 04/24/2019                  Appointments  past 12m or future 3m with PCP    Date Provider   Last Visit   10/29/2021 Sejal Hughes MD   Next Visit   1/26/2022 Sejal Hughes MD   ED visits in past 90 days: 0     Note composed:4:46 PM 01/31/2022

## 2022-04-23 ENCOUNTER — LAB VISIT (OUTPATIENT)
Dept: LAB | Facility: HOSPITAL | Age: 62
End: 2022-04-23
Attending: INTERNAL MEDICINE
Payer: COMMERCIAL

## 2022-04-23 DIAGNOSIS — E61.1 IRON DEFICIENCY: ICD-10-CM

## 2022-04-23 DIAGNOSIS — E11.9 TYPE 2 DIABETES MELLITUS WITHOUT COMPLICATION, WITHOUT LONG-TERM CURRENT USE OF INSULIN: ICD-10-CM

## 2022-04-23 DIAGNOSIS — E66.01 MORBID OBESITY: ICD-10-CM

## 2022-04-23 DIAGNOSIS — E55.9 VITAMIN D DEFICIENCY: ICD-10-CM

## 2022-04-23 DIAGNOSIS — E78.2 HYPERLIPIDEMIA, MIXED: ICD-10-CM

## 2022-04-23 DIAGNOSIS — E79.0 HYPERURICEMIA WITHOUT SIGNS OF INFLAMMATORY ARTHRITIS AND TOPHACEOUS DISEASE: ICD-10-CM

## 2022-04-23 LAB
25(OH)D3+25(OH)D2 SERPL-MCNC: 31 NG/ML (ref 30–96)
ALBUMIN SERPL BCP-MCNC: 3.6 G/DL (ref 3.5–5.2)
ALP SERPL-CCNC: 83 U/L (ref 55–135)
ALT SERPL W/O P-5'-P-CCNC: 13 U/L (ref 10–44)
ANION GAP SERPL CALC-SCNC: 9 MMOL/L (ref 8–16)
AST SERPL-CCNC: 16 U/L (ref 10–40)
BASOPHILS # BLD AUTO: 0.06 K/UL (ref 0–0.2)
BASOPHILS NFR BLD: 0.8 % (ref 0–1.9)
BILIRUB SERPL-MCNC: 0.7 MG/DL (ref 0.1–1)
BUN SERPL-MCNC: 11 MG/DL (ref 6–20)
CALCIUM SERPL-MCNC: 9.2 MG/DL (ref 8.7–10.5)
CHLORIDE SERPL-SCNC: 102 MMOL/L (ref 95–110)
CHOLEST SERPL-MCNC: 118 MG/DL (ref 120–199)
CHOLEST/HDLC SERPL: 3.1 {RATIO} (ref 2–5)
CO2 SERPL-SCNC: 28 MMOL/L (ref 23–29)
CREAT SERPL-MCNC: 0.7 MG/DL (ref 0.5–1.4)
DIFFERENTIAL METHOD: ABNORMAL
EOSINOPHIL # BLD AUTO: 0.3 K/UL (ref 0–0.5)
EOSINOPHIL NFR BLD: 3.9 % (ref 0–8)
ERYTHROCYTE [DISTWIDTH] IN BLOOD BY AUTOMATED COUNT: 14.9 % (ref 11.5–14.5)
EST. GFR  (AFRICAN AMERICAN): >60 ML/MIN/1.73 M^2
EST. GFR  (NON AFRICAN AMERICAN): >60 ML/MIN/1.73 M^2
ESTIMATED AVG GLUCOSE: 111 MG/DL (ref 68–131)
FERRITIN SERPL-MCNC: 5 NG/ML (ref 20–300)
GLUCOSE SERPL-MCNC: 88 MG/DL (ref 70–110)
HBA1C MFR BLD: 5.5 % (ref 4–5.6)
HCT VFR BLD AUTO: 39.7 % (ref 40–54)
HDLC SERPL-MCNC: 38 MG/DL (ref 40–75)
HDLC SERPL: 32.2 % (ref 20–50)
HGB BLD-MCNC: 12.3 G/DL (ref 14–18)
IMM GRANULOCYTES # BLD AUTO: 0.07 K/UL (ref 0–0.04)
IMM GRANULOCYTES NFR BLD AUTO: 0.9 % (ref 0–0.5)
LDLC SERPL CALC-MCNC: 70.2 MG/DL (ref 63–159)
LYMPHOCYTES # BLD AUTO: 1.9 K/UL (ref 1–4.8)
LYMPHOCYTES NFR BLD: 24.4 % (ref 18–48)
MCH RBC QN AUTO: 24.9 PG (ref 27–31)
MCHC RBC AUTO-ENTMCNC: 31 G/DL (ref 32–36)
MCV RBC AUTO: 81 FL (ref 82–98)
MONOCYTES # BLD AUTO: 0.8 K/UL (ref 0.3–1)
MONOCYTES NFR BLD: 10.9 % (ref 4–15)
NEUTROPHILS # BLD AUTO: 4.6 K/UL (ref 1.8–7.7)
NEUTROPHILS NFR BLD: 59.1 % (ref 38–73)
NONHDLC SERPL-MCNC: 80 MG/DL
NRBC BLD-RTO: 0 /100 WBC
PLATELET # BLD AUTO: 305 K/UL (ref 150–450)
PMV BLD AUTO: 10.9 FL (ref 9.2–12.9)
POTASSIUM SERPL-SCNC: 4.5 MMOL/L (ref 3.5–5.1)
PROT SERPL-MCNC: 6.6 G/DL (ref 6–8.4)
RBC # BLD AUTO: 4.93 M/UL (ref 4.6–6.2)
SODIUM SERPL-SCNC: 139 MMOL/L (ref 136–145)
TRIGL SERPL-MCNC: 49 MG/DL (ref 30–150)
TSH SERPL DL<=0.005 MIU/L-ACNC: 1.49 UIU/ML (ref 0.4–4)
URATE SERPL-MCNC: 3.4 MG/DL (ref 3.4–7)
WBC # BLD AUTO: 7.7 K/UL (ref 3.9–12.7)

## 2022-04-23 PROCEDURE — 82728 ASSAY OF FERRITIN: CPT | Performed by: INTERNAL MEDICINE

## 2022-04-23 PROCEDURE — 82306 VITAMIN D 25 HYDROXY: CPT | Performed by: INTERNAL MEDICINE

## 2022-04-23 PROCEDURE — 36415 COLL VENOUS BLD VENIPUNCTURE: CPT | Performed by: INTERNAL MEDICINE

## 2022-04-23 PROCEDURE — 84443 ASSAY THYROID STIM HORMONE: CPT | Performed by: INTERNAL MEDICINE

## 2022-04-23 PROCEDURE — 85025 COMPLETE CBC W/AUTO DIFF WBC: CPT | Performed by: INTERNAL MEDICINE

## 2022-04-23 PROCEDURE — 80053 COMPREHEN METABOLIC PANEL: CPT | Performed by: INTERNAL MEDICINE

## 2022-04-23 PROCEDURE — 80061 LIPID PANEL: CPT | Performed by: INTERNAL MEDICINE

## 2022-04-23 PROCEDURE — 84550 ASSAY OF BLOOD/URIC ACID: CPT | Performed by: INTERNAL MEDICINE

## 2022-04-23 PROCEDURE — 83036 HEMOGLOBIN GLYCOSYLATED A1C: CPT | Performed by: INTERNAL MEDICINE

## 2022-05-11 ENCOUNTER — TELEPHONE (OUTPATIENT)
Dept: INTERNAL MEDICINE | Facility: CLINIC | Age: 62
End: 2022-05-11
Payer: COMMERCIAL

## 2022-05-11 NOTE — TELEPHONE ENCOUNTER
----- Message from Tu You sent at 5/11/2022  9:13 AM CDT -----  Contact: [phone]  PT is calling in, he had an apt for 5/2 and  was out, she is showing nothing till October, he would like a call back, thanks

## 2022-05-12 ENCOUNTER — TELEPHONE (OUTPATIENT)
Dept: INTERNAL MEDICINE | Facility: CLINIC | Age: 62
End: 2022-05-12
Payer: COMMERCIAL

## 2022-05-12 NOTE — TELEPHONE ENCOUNTER
----- Message from Nela Estrada sent at 5/12/2022  2:37 PM CDT -----  Contact: pt  Caller is requesting an earlier appointment then we can schedule.  Caller is requesting a message be sent to the provider.  If this is for urgent care symptoms, did you offer other providers at this location, providers at other locations, or [facility]? (yes, no, n/a):    If this is for the patients physical, did you offer to schedule next available and put on wait list, or to see NP or PA for their physical?  (yes, no, n/a):    When is the next available appointment with their provider:10/2022    Reason for the appointment:  esc from Dr Hughes  Patient preference of timeframe to be scheduled: asap   Would the patient like a call back, or a response through their [facility] portal?:call  Comments:         Health Care Proxy (HCP)

## 2022-05-17 ENCOUNTER — PATIENT MESSAGE (OUTPATIENT)
Dept: INTERNAL MEDICINE | Facility: CLINIC | Age: 62
End: 2022-05-17
Payer: COMMERCIAL

## 2022-05-18 DIAGNOSIS — G43.919 INTRACTABLE MIGRAINE WITHOUT STATUS MIGRAINOSUS, UNSPECIFIED MIGRAINE TYPE: ICD-10-CM

## 2022-05-18 DIAGNOSIS — K21.9 GASTROESOPHAGEAL REFLUX DISEASE WITHOUT ESOPHAGITIS: ICD-10-CM

## 2022-05-18 DIAGNOSIS — I10 ESSENTIAL HYPERTENSION: ICD-10-CM

## 2022-05-18 DIAGNOSIS — E11.9 TYPE 2 DIABETES MELLITUS WITHOUT COMPLICATION, WITHOUT LONG-TERM CURRENT USE OF INSULIN: ICD-10-CM

## 2022-05-18 DIAGNOSIS — E78.2 HYPERLIPIDEMIA, MIXED: Primary | ICD-10-CM

## 2022-05-18 DIAGNOSIS — F33.41 RECURRENT MAJOR DEPRESSIVE DISORDER, IN PARTIAL REMISSION: ICD-10-CM

## 2022-05-18 RX ORDER — OMEPRAZOLE 20 MG/1
CAPSULE, DELAYED RELEASE ORAL
Qty: 90 CAPSULE | Refills: 0 | Status: SHIPPED | OUTPATIENT
Start: 2022-05-18 | End: 2022-05-30 | Stop reason: SDUPTHER

## 2022-05-18 RX ORDER — ONDANSETRON 8 MG/1
8 TABLET, ORALLY DISINTEGRATING ORAL
Qty: 30 TABLET | Refills: 1 | Status: SHIPPED | OUTPATIENT
Start: 2022-05-18

## 2022-05-18 RX ORDER — ATORVASTATIN CALCIUM 80 MG/1
80 TABLET, FILM COATED ORAL NIGHTLY
Qty: 90 TABLET | Refills: 0 | Status: SHIPPED | OUTPATIENT
Start: 2022-05-18 | End: 2022-05-30 | Stop reason: SDUPTHER

## 2022-05-18 RX ORDER — FLUOXETINE HYDROCHLORIDE 40 MG/1
40 CAPSULE ORAL EVERY MORNING
Qty: 90 CAPSULE | Refills: 0 | Status: SHIPPED | OUTPATIENT
Start: 2022-05-18 | End: 2022-05-30 | Stop reason: SDUPTHER

## 2022-05-18 RX ORDER — METFORMIN HYDROCHLORIDE 500 MG/1
500 TABLET ORAL
Qty: 90 TABLET | Refills: 0 | Status: SHIPPED | OUTPATIENT
Start: 2022-05-18 | End: 2022-05-30 | Stop reason: SDUPTHER

## 2022-05-18 NOTE — TELEPHONE ENCOUNTER
----- Message from Maddi Fabian sent at 5/18/2022  2:28 PM CDT -----  Contact: Self [phone]  Requesting an RX refill or new RX.    Is this a refill or new RX: refill    RX name and strength (copy/paste from chart):  atorvastatin (LIPITOR) 80 MG tablet, ondansetron (ZOFRAN-ODT) 8 MG TbDL, omeprazole (PRILOSEC) 20 MG capsule, metFORMIN(GLUCOPHAGE) 500 MG tablet, FLUoxetine 40 MG capsule and INV losartan (COZAAR) 100 MG Tab    Is this a 30 day or 90 day RX: 30    Pharmacy name and phone # (copy/paste from chart):      [email or web address] [address], [address], [address] [address]  Phone: [phone] Fax: [phone]    Pt would like a refill until upcoming appt and requesting a call back or portal.

## 2022-05-30 ENCOUNTER — HOSPITAL ENCOUNTER (OUTPATIENT)
Dept: RADIOLOGY | Facility: HOSPITAL | Age: 62
Discharge: HOME | End: 2022-05-30
Attending: INTERNAL MEDICINE
Payer: COMMERCIAL

## 2022-05-30 ENCOUNTER — OFFICE VISIT (OUTPATIENT)
Dept: INTERNAL MEDICINE | Facility: CLINIC | Age: 62
End: 2022-05-30
Payer: COMMERCIAL

## 2022-05-30 VITALS
HEART RATE: 103 BPM | OXYGEN SATURATION: 97 % | BODY MASS INDEX: 33.79 KG/M2 | SYSTOLIC BLOOD PRESSURE: 122 MMHG | HEIGHT: 67 IN | WEIGHT: 215.25 LBS | DIASTOLIC BLOOD PRESSURE: 78 MMHG

## 2022-05-30 DIAGNOSIS — K21.9 GASTROESOPHAGEAL REFLUX DISEASE WITHOUT ESOPHAGITIS: ICD-10-CM

## 2022-05-30 DIAGNOSIS — I10 ESSENTIAL HYPERTENSION: ICD-10-CM

## 2022-05-30 DIAGNOSIS — R10.31 RIGHT LOWER QUADRANT ABDOMINAL PAIN: ICD-10-CM

## 2022-05-30 DIAGNOSIS — E55.9 VITAMIN D DEFICIENCY: ICD-10-CM

## 2022-05-30 DIAGNOSIS — E11.69 TYPE 2 DIABETES MELLITUS WITH OTHER SPECIFIED COMPLICATION, WITHOUT LONG-TERM CURRENT USE OF INSULIN: ICD-10-CM

## 2022-05-30 DIAGNOSIS — Z76.89 ENCOUNTER TO ESTABLISH CARE WITH NEW DOCTOR: Primary | ICD-10-CM

## 2022-05-30 DIAGNOSIS — D50.9 IRON DEFICIENCY ANEMIA, UNSPECIFIED IRON DEFICIENCY ANEMIA TYPE: ICD-10-CM

## 2022-05-30 DIAGNOSIS — F33.41 RECURRENT MAJOR DEPRESSIVE DISORDER, IN PARTIAL REMISSION: ICD-10-CM

## 2022-05-30 DIAGNOSIS — F32.A DEPRESSION, UNSPECIFIED DEPRESSION TYPE: ICD-10-CM

## 2022-05-30 DIAGNOSIS — G47.33 OSA (OBSTRUCTIVE SLEEP APNEA): ICD-10-CM

## 2022-05-30 DIAGNOSIS — R68.89 SUSPECTED GLAUCOMA OF BOTH EYES: ICD-10-CM

## 2022-05-30 DIAGNOSIS — E78.2 HYPERLIPIDEMIA, MIXED: ICD-10-CM

## 2022-05-30 PROCEDURE — 3078F PR MOST RECENT DIASTOLIC BLOOD PRESSURE < 80 MM HG: ICD-10-PCS | Mod: CPTII,S$GLB,, | Performed by: INTERNAL MEDICINE

## 2022-05-30 PROCEDURE — 3074F PR MOST RECENT SYSTOLIC BLOOD PRESSURE < 130 MM HG: ICD-10-PCS | Mod: CPTII,S$GLB,, | Performed by: INTERNAL MEDICINE

## 2022-05-30 PROCEDURE — 3078F DIAST BP <80 MM HG: CPT | Mod: CPTII,S$GLB,, | Performed by: INTERNAL MEDICINE

## 2022-05-30 PROCEDURE — 1159F MED LIST DOCD IN RCRD: CPT | Mod: CPTII,S$GLB,, | Performed by: INTERNAL MEDICINE

## 2022-05-30 PROCEDURE — 76705 US ABDOMEN LIMITED_HERNIA: ICD-10-PCS | Mod: 26,,, | Performed by: RADIOLOGY

## 2022-05-30 PROCEDURE — 99214 PR OFFICE/OUTPT VISIT, EST, LEVL IV, 30-39 MIN: ICD-10-PCS | Mod: S$GLB,,, | Performed by: INTERNAL MEDICINE

## 2022-05-30 PROCEDURE — 76705 ECHO EXAM OF ABDOMEN: CPT | Mod: 26,,, | Performed by: RADIOLOGY

## 2022-05-30 PROCEDURE — 3044F PR MOST RECENT HEMOGLOBIN A1C LEVEL <7.0%: ICD-10-PCS | Mod: CPTII,S$GLB,, | Performed by: INTERNAL MEDICINE

## 2022-05-30 PROCEDURE — 76705 ECHO EXAM OF ABDOMEN: CPT | Mod: TC

## 2022-05-30 PROCEDURE — 1160F RVW MEDS BY RX/DR IN RCRD: CPT | Mod: CPTII,S$GLB,, | Performed by: INTERNAL MEDICINE

## 2022-05-30 PROCEDURE — 3008F PR BODY MASS INDEX (BMI) DOCUMENTED: ICD-10-PCS | Mod: CPTII,S$GLB,, | Performed by: INTERNAL MEDICINE

## 2022-05-30 PROCEDURE — 1159F PR MEDICATION LIST DOCUMENTED IN MEDICAL RECORD: ICD-10-PCS | Mod: CPTII,S$GLB,, | Performed by: INTERNAL MEDICINE

## 2022-05-30 PROCEDURE — 99999 PR PBB SHADOW E&M-EST. PATIENT-LVL IV: CPT | Mod: PBBFAC,,, | Performed by: INTERNAL MEDICINE

## 2022-05-30 PROCEDURE — 99214 OFFICE O/P EST MOD 30 MIN: CPT | Mod: S$GLB,,, | Performed by: INTERNAL MEDICINE

## 2022-05-30 PROCEDURE — 99999 PR PBB SHADOW E&M-EST. PATIENT-LVL IV: ICD-10-PCS | Mod: PBBFAC,,, | Performed by: INTERNAL MEDICINE

## 2022-05-30 PROCEDURE — 3044F HG A1C LEVEL LT 7.0%: CPT | Mod: CPTII,S$GLB,, | Performed by: INTERNAL MEDICINE

## 2022-05-30 PROCEDURE — 1160F PR REVIEW ALL MEDS BY PRESCRIBER/CLIN PHARMACIST DOCUMENTED: ICD-10-PCS | Mod: CPTII,S$GLB,, | Performed by: INTERNAL MEDICINE

## 2022-05-30 PROCEDURE — 4010F PR ACE/ARB THEARPY RXD/TAKEN: ICD-10-PCS | Mod: CPTII,S$GLB,, | Performed by: INTERNAL MEDICINE

## 2022-05-30 PROCEDURE — 3074F SYST BP LT 130 MM HG: CPT | Mod: CPTII,S$GLB,, | Performed by: INTERNAL MEDICINE

## 2022-05-30 PROCEDURE — 3008F BODY MASS INDEX DOCD: CPT | Mod: CPTII,S$GLB,, | Performed by: INTERNAL MEDICINE

## 2022-05-30 PROCEDURE — 4010F ACE/ARB THERAPY RXD/TAKEN: CPT | Mod: CPTII,S$GLB,, | Performed by: INTERNAL MEDICINE

## 2022-05-30 RX ORDER — SEMAGLUTIDE 1.34 MG/ML
1 INJECTION, SOLUTION SUBCUTANEOUS
Qty: 3 PEN | Refills: 4 | Status: SHIPPED | OUTPATIENT
Start: 2022-05-30 | End: 2023-05-30

## 2022-05-30 RX ORDER — CHOLECALCIFEROL (VITAMIN D3) 25 MCG
1000 TABLET ORAL DAILY
COMMUNITY

## 2022-05-30 RX ORDER — FLUOXETINE HYDROCHLORIDE 20 MG/1
20 CAPSULE ORAL EVERY MORNING
Qty: 90 CAPSULE | Refills: 0 | Status: SHIPPED | OUTPATIENT
Start: 2022-05-30 | End: 2022-08-05 | Stop reason: SDUPTHER

## 2022-05-30 RX ORDER — METFORMIN HYDROCHLORIDE 500 MG/1
500 TABLET ORAL
Qty: 90 TABLET | Refills: 3 | Status: SHIPPED | OUTPATIENT
Start: 2022-05-30 | End: 2022-11-14 | Stop reason: ALTCHOICE

## 2022-05-30 RX ORDER — ATORVASTATIN CALCIUM 80 MG/1
80 TABLET, FILM COATED ORAL NIGHTLY
Qty: 90 TABLET | Refills: 3 | Status: SHIPPED | OUTPATIENT
Start: 2022-05-30 | End: 2022-11-14 | Stop reason: SDUPTHER

## 2022-05-30 RX ORDER — OMEPRAZOLE 20 MG/1
CAPSULE, DELAYED RELEASE ORAL
Qty: 90 CAPSULE | Refills: 3 | Status: SHIPPED | OUTPATIENT
Start: 2022-05-30 | End: 2022-11-14 | Stop reason: SDUPTHER

## 2022-05-30 NOTE — PATIENT INSTRUCTIONS
Schedule ultrasound.     Please make an appointment with your eye doctor.    Add Fluoxetine 20mg to current dose of 40mg, for total of 60mg.

## 2022-05-30 NOTE — PROGRESS NOTES
INTERNAL MEDICINE INITIAL VISIT NOTE      CHIEF COMPLAINT     Chief Complaint   Patient presents with    Establish Care    Follow-up     6 mth       HPI     David Nicole Jr. is a 62 y.o. male with migraine, depression, glaucoma suspect, HLD, HTN, ED, BPH, DOUGLAS, T2DM (4/2022 HgbA1C 5.5), GERD, HILARIA, here today to establish care. Transferring care from Dr. Hughes.     Multiple nngoing stressors - all 3 getting  soon, father in ill health. Has found Fluoxetine less helpful, has been on for numerous years. Feeling more irritable, less calm. No SI/HI.     Feels pressure and bulge in R lower abdomen - 'feels like a knot.' Occasional pain after lifting or moving objects. Concerned he has hernia.     Past Medical History:  Past Medical History:   Diagnosis Date    Anxiety     Arthritis     Baker's cyst     Diabetes mellitus, type 2     H/O peptic ulcer 02/27/2018    H/O type 2 diabetes mellitus 02/27/2018    Hyperlipidemia     Hypertension     Insomnia     Intractable migraine with aura with status migrainosus 08/17/2016    GONB (Greater Occipital Nerve Block):2016    Joint pain     Knee pain     HILARIA (obstructive sleep apnea)     Prediabetes        Review of Systems:  Review of Systems   Constitutional: Negative for chills and fever.   HENT: Negative for congestion.    Respiratory: Negative for cough and shortness of breath.    Cardiovascular: Negative for chest pain.   Gastrointestinal: Negative for constipation, nausea and vomiting.   Genitourinary: Negative for hematuria and urgency.   Musculoskeletal: Negative for falls.   Skin: Negative for rash.   Neurological: Negative for dizziness and loss of consciousness.       Health Maintenance:   Immunizations:   Influenza - fall 2022  Tdap - 1/2017  Covid 19 - complete  HPV  Prevnar rec at 67 - Pneumovax 9/2020  Shingrix rec at 52 - next visit    Cancer Screening:  PAP: n/a  Mammogram:  n/a  Colonoscopy:  2/2017 1 hyperplastic polyp; next  "visit  DEXA:  n/a      PHYSICAL EXAM     /78 (BP Location: Left arm, Patient Position: Sitting, BP Method: Large (Manual))   Pulse 103   Ht 5' 7" (1.702 m)   Wt 97.7 kg (215 lb 4.5 oz)   SpO2 97%   BMI 33.72 kg/m²     GEN - A+OX4, NAD   HEENT - PERRL, EOMI,   Neck - No thyromegaly or thyroid masses felt.  No cervical lymphadenopathy appreciated.  CV - RRR, no m/r/g  Chest - CTAB, no wheezing, crackles, or rhonchi  Abd - S/NT/ND/+BS, obese, palpable RLQ firmness, no bulging with maneuvers  Ext - 2+BDP. No C/C/E.  Skin - Normal color and texture, no rash, no skin lesions.    ASSESSMENT/PLAN     David Nicole Jr. is a 62 y.o. male with conditions as above.     Encounter to establish care with new doctor  Recent labs reviewed    Right lower quadrant abdominal pain  No discernible protrusion on exam with maneuvers, palpable firmness present  Obtain US  -     US Abdomen Limited_Hernia; Future; Expected date: 05/30/2022    Depression, unspecified depression type  Not well-controlled; increase Fluoxetine from 40 to 60mg  Advised to notify if no symptom improvement    Type 2 diabetes mellitus with other specified complication, without long-term current use of insulin  Controlled, 4/2022 HgbA1C 5.5  Discussed possibly discontinuing Metformin; given ongoing stress and upcoming weddings, address stopping Metformin at follow-up  -     metFORMIN (GLUCOPHAGE) 500 MG tablet; Take 1 tablet (500 mg total) by mouth daily with breakfast.  Dispense: 90 tablet; Refill: 3  -     semaglutide (OZEMPIC) 1 mg/dose (4 mg/3 mL); Inject 1 mg into the skin every 7 days.  Dispense: 3 pen; Refill: 4    Hyperlipidemia, mixed  Controlled  -     atorvastatin (LIPITOR) 80 MG tablet; Take 1 tablet (80 mg total) by mouth every evening.  Dispense: 90 tablet; Refill: 3    Essential hypertension  Controlled  -     INV losartan (COZAAR) 100 MG Tab; Take 1 tablet (100 mg total) by mouth nightly.  Dispense: 90 tablet; Refill: 3    Iron deficiency " anemia, unspecified iron deficiency anemia type  Stable, long-standing diagnosis    Suspected glaucoma of both eyes  Advised to make appt    HILARIA (obstructive sleep apnea)  Unable to tolerate CPAP/other devices; deferring re-evaluation    Recurrent major depressive disorder, in partial remission  As above  -     FLUoxetine 20 MG capsule; Take 1 capsule (20 mg total) by mouth every morning.  Dispense: 90 capsule; Refill: 0    Gastroesophageal reflux disease without esophagitis  No alarm symptoms  -     omeprazole (PRILOSEC) 20 MG capsule; TAKE 1 CAPSULE BY MOUTH  EVERY MORNING TO CONTROL  GERD  Dispense: 90 capsule; Refill: 3    Vitamin D deficiency  Supplement     HM as above.    RTC in 3 mo, sooner if needed and depending on labs.    Светлана Flynn MD  Department of Internal Medicine - [facility]  05/30/2022

## 2022-05-31 ENCOUNTER — PATIENT MESSAGE (OUTPATIENT)
Dept: INTERNAL MEDICINE | Facility: CLINIC | Age: 62
End: 2022-05-31
Payer: COMMERCIAL

## 2022-06-01 ENCOUNTER — PATIENT MESSAGE (OUTPATIENT)
Dept: ADMINISTRATIVE | Facility: HOSPITAL | Age: 62
End: 2022-06-01
Payer: COMMERCIAL

## 2022-07-13 DIAGNOSIS — E11.9 TYPE 2 DIABETES MELLITUS WITHOUT COMPLICATION: ICD-10-CM

## 2022-08-05 ENCOUNTER — PATIENT MESSAGE (OUTPATIENT)
Dept: INTERNAL MEDICINE | Facility: CLINIC | Age: 62
End: 2022-08-05
Payer: COMMERCIAL

## 2022-08-05 DIAGNOSIS — F33.41 RECURRENT MAJOR DEPRESSIVE DISORDER, IN PARTIAL REMISSION: ICD-10-CM

## 2022-08-05 RX ORDER — FLUOXETINE HYDROCHLORIDE 20 MG/1
60 CAPSULE ORAL EVERY MORNING
Qty: 270 CAPSULE | Refills: 3 | Status: SHIPPED | OUTPATIENT
Start: 2022-08-05 | End: 2022-11-23

## 2022-08-05 RX ORDER — FLUOXETINE HYDROCHLORIDE 20 MG/1
CAPSULE ORAL
Qty: 90 CAPSULE | Refills: 3 | OUTPATIENT
Start: 2022-08-05

## 2022-08-05 NOTE — TELEPHONE ENCOUNTER
Care Due:                  Date            Visit Type   Department     Provider  --------------------------------------------------------------------------------                                NP -                              PRIMARY      [facility] INTERNAL  Last Visit: 05-      CARE ([facility])   [facility]                              EP -                              PRIMARY      [facility] INTERNAL  Next Visit: 08-      CARE ([facility])   [facility]                                                            Last  Test          Frequency    Reason                     Performed    Due Date  --------------------------------------------------------------------------------    HBA1C.......  6 months...  metFORMIN, semaglutide...  04-   10-    [facility] Embedded Care Gaps. Reference number: [id number]. 8/05/2022   7:59:07 AM CDT

## 2022-08-05 NOTE — TELEPHONE ENCOUNTER
Refill Routing Note   Medication(s) are not appropriate for processing by [facility] for the following reason(s):      - Medication requested has undergone a recent dosage adjustment (<3 months)    [facility] action(s):  Defer          Medication reconciliation completed: No     Appointments  past 12m or future 3m with PCP    Date Provider   Last Visit   5/30/2022 Luis Flynn MD   Next Visit   8/30/2022 Luis Flynn MD   ED visits in past 90 days: 0        Note composed:12:07 PM 08/05/2022

## 2022-08-05 NOTE — TELEPHONE ENCOUNTER
Call Pt Pt stated that he was at work and when he get home he will let me no the capsules dosage he take.

## 2022-10-25 ENCOUNTER — PATIENT MESSAGE (OUTPATIENT)
Dept: INTERNAL MEDICINE | Facility: CLINIC | Age: 62
End: 2022-10-25
Payer: COMMERCIAL

## 2022-10-26 NOTE — TELEPHONE ENCOUNTER
Pt requesting bloodwork and medication changes    Is a virtual appt more appropriate for appt being requested after tests are done?       Otherwise I can schedule as normal.     Please advise

## 2022-10-27 ENCOUNTER — PATIENT MESSAGE (OUTPATIENT)
Dept: INTERNAL MEDICINE | Facility: CLINIC | Age: 62
End: 2022-10-27
Payer: COMMERCIAL

## 2022-10-28 ENCOUNTER — PATIENT MESSAGE (OUTPATIENT)
Dept: INTERNAL MEDICINE | Facility: CLINIC | Age: 62
End: 2022-10-28
Payer: COMMERCIAL

## 2022-10-28 DIAGNOSIS — F32.A DEPRESSION, UNSPECIFIED DEPRESSION TYPE: ICD-10-CM

## 2022-10-28 DIAGNOSIS — E11.69 TYPE 2 DIABETES MELLITUS WITH OTHER SPECIFIED COMPLICATION, WITHOUT LONG-TERM CURRENT USE OF INSULIN: Primary | ICD-10-CM

## 2022-10-28 DIAGNOSIS — F33.41 RECURRENT MAJOR DEPRESSIVE DISORDER, IN PARTIAL REMISSION: ICD-10-CM

## 2022-10-29 ENCOUNTER — PATIENT MESSAGE (OUTPATIENT)
Dept: INTERNAL MEDICINE | Facility: CLINIC | Age: 62
End: 2022-10-29
Payer: COMMERCIAL

## 2022-10-31 ENCOUNTER — PATIENT MESSAGE (OUTPATIENT)
Dept: INTERNAL MEDICINE | Facility: CLINIC | Age: 62
End: 2022-10-31
Payer: COMMERCIAL

## 2022-11-12 ENCOUNTER — LAB VISIT (OUTPATIENT)
Dept: LAB | Facility: HOSPITAL | Age: 62
End: 2022-11-12
Payer: COMMERCIAL

## 2022-11-12 DIAGNOSIS — F32.A DEPRESSION, UNSPECIFIED DEPRESSION TYPE: ICD-10-CM

## 2022-11-12 DIAGNOSIS — E11.69 TYPE 2 DIABETES MELLITUS WITH OTHER SPECIFIED COMPLICATION, WITHOUT LONG-TERM CURRENT USE OF INSULIN: ICD-10-CM

## 2022-11-12 LAB
ALBUMIN SERPL BCP-MCNC: 3.9 G/DL (ref 3.5–5.2)
ALP SERPL-CCNC: 73 U/L (ref 55–135)
ALT SERPL W/O P-5'-P-CCNC: 16 U/L (ref 10–44)
ANION GAP SERPL CALC-SCNC: 9 MMOL/L (ref 8–16)
AST SERPL-CCNC: 21 U/L (ref 10–40)
BASOPHILS # BLD AUTO: 0.05 K/UL (ref 0–0.2)
BASOPHILS NFR BLD: 0.8 % (ref 0–1.9)
BILIRUB SERPL-MCNC: 0.8 MG/DL (ref 0.1–1)
BUN SERPL-MCNC: 14 MG/DL (ref 6–20)
CALCIUM SERPL-MCNC: 9.5 MG/DL (ref 8.7–10.5)
CHLORIDE SERPL-SCNC: 102 MMOL/L (ref 95–110)
CO2 SERPL-SCNC: 26 MMOL/L (ref 23–29)
CREAT SERPL-MCNC: 0.8 MG/DL (ref 0.5–1.4)
DIFFERENTIAL METHOD: ABNORMAL
EOSINOPHIL # BLD AUTO: 0.2 K/UL (ref 0–0.5)
EOSINOPHIL NFR BLD: 2.9 % (ref 0–8)
ERYTHROCYTE [DISTWIDTH] IN BLOOD BY AUTOMATED COUNT: 21.3 % (ref 11.5–14.5)
EST. GFR  (NO RACE VARIABLE): >60 ML/MIN/1.73 M^2
ESTIMATED AVG GLUCOSE: 100 MG/DL (ref 68–131)
FERRITIN SERPL-MCNC: 15 NG/ML (ref 20–300)
GLUCOSE SERPL-MCNC: 89 MG/DL (ref 70–110)
HBA1C MFR BLD: 5.1 % (ref 4–5.6)
HCT VFR BLD AUTO: 41.7 % (ref 40–54)
HGB BLD-MCNC: 12.5 G/DL (ref 14–18)
IMM GRANULOCYTES # BLD AUTO: 0.04 K/UL (ref 0–0.04)
IMM GRANULOCYTES NFR BLD AUTO: 0.6 % (ref 0–0.5)
LYMPHOCYTES # BLD AUTO: 1.3 K/UL (ref 1–4.8)
LYMPHOCYTES NFR BLD: 19.5 % (ref 18–48)
MCH RBC QN AUTO: 25 PG (ref 27–31)
MCHC RBC AUTO-ENTMCNC: 30 G/DL (ref 32–36)
MCV RBC AUTO: 83 FL (ref 82–98)
MONOCYTES # BLD AUTO: 0.6 K/UL (ref 0.3–1)
MONOCYTES NFR BLD: 8.7 % (ref 4–15)
NEUTROPHILS # BLD AUTO: 4.5 K/UL (ref 1.8–7.7)
NEUTROPHILS NFR BLD: 67.5 % (ref 38–73)
NRBC BLD-RTO: 0 /100 WBC
PLATELET # BLD AUTO: 329 K/UL (ref 150–450)
PMV BLD AUTO: 10.4 FL (ref 9.2–12.9)
POTASSIUM SERPL-SCNC: 5.2 MMOL/L (ref 3.5–5.1)
PROT SERPL-MCNC: 6.9 G/DL (ref 6–8.4)
RBC # BLD AUTO: 5 M/UL (ref 4.6–6.2)
SODIUM SERPL-SCNC: 137 MMOL/L (ref 136–145)
TSH SERPL DL<=0.005 MIU/L-ACNC: 0.94 UIU/ML (ref 0.4–4)
URATE SERPL-MCNC: 3.6 MG/DL (ref 3.4–7)
WBC # BLD AUTO: 6.65 K/UL (ref 3.9–12.7)

## 2022-11-12 PROCEDURE — 84550 ASSAY OF BLOOD/URIC ACID: CPT | Performed by: INTERNAL MEDICINE

## 2022-11-12 PROCEDURE — 85025 COMPLETE CBC W/AUTO DIFF WBC: CPT | Performed by: INTERNAL MEDICINE

## 2022-11-12 PROCEDURE — 84443 ASSAY THYROID STIM HORMONE: CPT | Performed by: INTERNAL MEDICINE

## 2022-11-12 PROCEDURE — 80053 COMPREHEN METABOLIC PANEL: CPT | Performed by: INTERNAL MEDICINE

## 2022-11-12 PROCEDURE — 36415 COLL VENOUS BLD VENIPUNCTURE: CPT | Performed by: INTERNAL MEDICINE

## 2022-11-12 PROCEDURE — 83036 HEMOGLOBIN GLYCOSYLATED A1C: CPT | Performed by: INTERNAL MEDICINE

## 2022-11-12 PROCEDURE — 82728 ASSAY OF FERRITIN: CPT | Performed by: INTERNAL MEDICINE

## 2022-11-14 ENCOUNTER — IMMUNIZATION (OUTPATIENT)
Dept: INTERNAL MEDICINE | Facility: CLINIC | Age: 62
End: 2022-11-14
Payer: COMMERCIAL

## 2022-11-14 ENCOUNTER — OFFICE VISIT (OUTPATIENT)
Dept: INTERNAL MEDICINE | Facility: CLINIC | Age: 62
End: 2022-11-14
Payer: COMMERCIAL

## 2022-11-14 VITALS
WEIGHT: 210.31 LBS | HEART RATE: 90 BPM | DIASTOLIC BLOOD PRESSURE: 82 MMHG | SYSTOLIC BLOOD PRESSURE: 142 MMHG | OXYGEN SATURATION: 99 % | BODY MASS INDEX: 33.01 KG/M2 | HEIGHT: 67 IN

## 2022-11-14 DIAGNOSIS — I10 ESSENTIAL HYPERTENSION: ICD-10-CM

## 2022-11-14 DIAGNOSIS — E78.2 HYPERLIPIDEMIA, MIXED: ICD-10-CM

## 2022-11-14 DIAGNOSIS — D50.9 IRON DEFICIENCY ANEMIA, UNSPECIFIED IRON DEFICIENCY ANEMIA TYPE: ICD-10-CM

## 2022-11-14 DIAGNOSIS — E66.09 CLASS 1 OBESITY DUE TO EXCESS CALORIES WITH SERIOUS COMORBIDITY AND BODY MASS INDEX (BMI) OF 32.0 TO 32.9 IN ADULT: ICD-10-CM

## 2022-11-14 DIAGNOSIS — F33.41 RECURRENT MAJOR DEPRESSIVE DISORDER, IN PARTIAL REMISSION: Primary | ICD-10-CM

## 2022-11-14 DIAGNOSIS — F43.22 ADJUSTMENT DISORDER WITH ANXIETY: ICD-10-CM

## 2022-11-14 DIAGNOSIS — E11.69 TYPE 2 DIABETES MELLITUS WITH OTHER SPECIFIED COMPLICATION, WITHOUT LONG-TERM CURRENT USE OF INSULIN: ICD-10-CM

## 2022-11-14 DIAGNOSIS — K21.9 GASTROESOPHAGEAL REFLUX DISEASE WITHOUT ESOPHAGITIS: ICD-10-CM

## 2022-11-14 PROCEDURE — 99214 PR OFFICE/OUTPT VISIT, EST, LEVL IV, 30-39 MIN: ICD-10-PCS | Mod: 25,S$GLB,, | Performed by: INTERNAL MEDICINE

## 2022-11-14 PROCEDURE — 1159F MED LIST DOCD IN RCRD: CPT | Mod: CPTII,S$GLB,, | Performed by: INTERNAL MEDICINE

## 2022-11-14 PROCEDURE — 3008F BODY MASS INDEX DOCD: CPT | Mod: CPTII,S$GLB,, | Performed by: INTERNAL MEDICINE

## 2022-11-14 PROCEDURE — 90686 FLU VACCINE (QUAD) GREATER THAN OR EQUAL TO 3YO PRESERVATIVE FREE IM: ICD-10-PCS | Mod: S$GLB,,, | Performed by: INTERNAL MEDICINE

## 2022-11-14 PROCEDURE — 1160F PR REVIEW ALL MEDS BY PRESCRIBER/CLIN PHARMACIST DOCUMENTED: ICD-10-PCS | Mod: CPTII,S$GLB,, | Performed by: INTERNAL MEDICINE

## 2022-11-14 PROCEDURE — 90471 IMMUNIZATION ADMIN: CPT | Mod: S$GLB,,, | Performed by: INTERNAL MEDICINE

## 2022-11-14 PROCEDURE — 3077F SYST BP >= 140 MM HG: CPT | Mod: CPTII,S$GLB,, | Performed by: INTERNAL MEDICINE

## 2022-11-14 PROCEDURE — 99999 PR PBB SHADOW E&M-EST. PATIENT-LVL V: CPT | Mod: PBBFAC,,, | Performed by: INTERNAL MEDICINE

## 2022-11-14 PROCEDURE — 3077F PR MOST RECENT SYSTOLIC BLOOD PRESSURE >= 140 MM HG: ICD-10-PCS | Mod: CPTII,S$GLB,, | Performed by: INTERNAL MEDICINE

## 2022-11-14 PROCEDURE — 3044F PR MOST RECENT HEMOGLOBIN A1C LEVEL <7.0%: ICD-10-PCS | Mod: CPTII,S$GLB,, | Performed by: INTERNAL MEDICINE

## 2022-11-14 PROCEDURE — 90471 FLU VACCINE (QUAD) GREATER THAN OR EQUAL TO 3YO PRESERVATIVE FREE IM: ICD-10-PCS | Mod: S$GLB,,, | Performed by: INTERNAL MEDICINE

## 2022-11-14 PROCEDURE — 3079F PR MOST RECENT DIASTOLIC BLOOD PRESSURE 80-89 MM HG: ICD-10-PCS | Mod: CPTII,S$GLB,, | Performed by: INTERNAL MEDICINE

## 2022-11-14 PROCEDURE — 1159F PR MEDICATION LIST DOCUMENTED IN MEDICAL RECORD: ICD-10-PCS | Mod: CPTII,S$GLB,, | Performed by: INTERNAL MEDICINE

## 2022-11-14 PROCEDURE — 4010F PR ACE/ARB THEARPY RXD/TAKEN: ICD-10-PCS | Mod: CPTII,S$GLB,, | Performed by: INTERNAL MEDICINE

## 2022-11-14 PROCEDURE — 90686 IIV4 VACC NO PRSV 0.5 ML IM: CPT | Mod: S$GLB,,, | Performed by: INTERNAL MEDICINE

## 2022-11-14 PROCEDURE — 4010F ACE/ARB THERAPY RXD/TAKEN: CPT | Mod: CPTII,S$GLB,, | Performed by: INTERNAL MEDICINE

## 2022-11-14 PROCEDURE — 3079F DIAST BP 80-89 MM HG: CPT | Mod: CPTII,S$GLB,, | Performed by: INTERNAL MEDICINE

## 2022-11-14 PROCEDURE — 99214 OFFICE O/P EST MOD 30 MIN: CPT | Mod: 25,S$GLB,, | Performed by: INTERNAL MEDICINE

## 2022-11-14 PROCEDURE — 3044F HG A1C LEVEL LT 7.0%: CPT | Mod: CPTII,S$GLB,, | Performed by: INTERNAL MEDICINE

## 2022-11-14 PROCEDURE — 1160F RVW MEDS BY RX/DR IN RCRD: CPT | Mod: CPTII,S$GLB,, | Performed by: INTERNAL MEDICINE

## 2022-11-14 PROCEDURE — 99999 PR PBB SHADOW E&M-EST. PATIENT-LVL V: ICD-10-PCS | Mod: PBBFAC,,, | Performed by: INTERNAL MEDICINE

## 2022-11-14 PROCEDURE — 3008F PR BODY MASS INDEX (BMI) DOCUMENTED: ICD-10-PCS | Mod: CPTII,S$GLB,, | Performed by: INTERNAL MEDICINE

## 2022-11-14 RX ORDER — OMEPRAZOLE 20 MG/1
CAPSULE, DELAYED RELEASE ORAL
Qty: 90 CAPSULE | Refills: 3 | Status: SHIPPED | OUTPATIENT
Start: 2022-11-14 | End: 2023-07-06 | Stop reason: SDUPTHER

## 2022-11-14 RX ORDER — ATORVASTATIN CALCIUM 80 MG/1
80 TABLET, FILM COATED ORAL NIGHTLY
Qty: 90 TABLET | Refills: 3 | Status: SHIPPED | OUTPATIENT
Start: 2022-11-14 | End: 2023-07-06 | Stop reason: SDUPTHER

## 2022-11-14 RX ORDER — BUSPIRONE HYDROCHLORIDE 5 MG/1
5 TABLET ORAL 2 TIMES DAILY
Qty: 180 TABLET | Refills: 0 | Status: SHIPPED | OUTPATIENT
Start: 2022-11-14 | End: 2023-07-06

## 2022-11-14 RX ORDER — FERROUS SULFATE 325(65) MG
325 TABLET, DELAYED RELEASE (ENTERIC COATED) ORAL DAILY
COMMUNITY

## 2022-11-14 RX ORDER — VERAPAMIL HYDROCHLORIDE 240 MG/1
240 CAPSULE, EXTENDED RELEASE ORAL DAILY
Qty: 90 CAPSULE | Refills: 3 | Status: SHIPPED | OUTPATIENT
Start: 2022-11-14 | End: 2023-07-06 | Stop reason: SDUPTHER

## 2022-11-14 NOTE — PROGRESS NOTES
INTERNAL MEDICINE ESTABLISHED PATIENT VISIT NOTE    Subjective:     Chief Complaint: Follow-up       Patient ID: David Nicole Jr. is a 62 y.o. male with migraine, depression, glaucoma suspect, HLD, HTN, ED, BPH, DOUGLAS, T2DM (11/2022 HgbA1C 5.1), GERD, HILARIA, obesity, last seen by me 5/2022, here today for follow-up.    Would like to see if any medications could be decreased/stopped related to blood sugar.     Requesting increase in anti-depressant dose. Depression mostly well-controlled. Now with anxiety symptoms; feeling 'quick to snap,' change from previous. Multiple concerns related to losing job at work, parents becoming older.     Tried to give blood a few months ago, advised Fe too low. Starting taking Fe supplement about 6 weeks ago. Side effect of constipation, takes Miralax and increases fiber intake as needed.     Past Medical History:  Past Medical History:   Diagnosis Date    Anxiety     Arthritis     Baker's cyst     Diabetes mellitus, type 2     H/O peptic ulcer 02/27/2018    H/O type 2 diabetes mellitus 02/27/2018    Hyperlipidemia     Hypertension     Insomnia     Intractable migraine with aura with status migrainosus 08/17/2016    GONB (Greater Occipital Nerve Block):2016    Joint pain     Knee pain     HILARIA (obstructive sleep apnea)     Prediabetes        Review of Systems:  Review of Systems   Constitutional:  Negative for chills and fever.   HENT:  Negative for congestion.    Respiratory:  Negative for cough and shortness of breath.    Cardiovascular:  Negative for chest pain.   Gastrointestinal:  Negative for constipation, nausea and vomiting.   Genitourinary:  Negative for hematuria and urgency.   Musculoskeletal:  Negative for falls.   Skin:  Negative for rash.   Neurological:  Negative for dizziness and loss of consciousness.     Health Maintenance:   Immunizations:   Influenza - today  Tdap - 1/2017  Covid 19 - complete  HPV  Prevnar rec at 67 - Pneumovax 9/2020  Shingrix rec at 52 - next  "visit     Cancer Screening:  PAP: n/a  Mammogram:  n/a  Colonoscopy:  2/2017 1 hyperplastic polyp; order  DEXA:  n/a    Objective:   BP (!) 142/82 (BP Location: Left arm, Patient Position: Sitting, BP Method: Large (Manual))   Pulse 90   Ht 5' 7" (1.702 m)   Wt 95.4 kg (210 lb 5.1 oz)   SpO2 99%   BMI 32.94 kg/m²        Labs:       Assessment/Plan     Recurrent major depressive disorder, in partial remission  Acceptable; on Fluoxetine 60mg, increased at last visit  Reports increased irritability and anxiety despite Fluoxetine 60mg, requesting additional titration  Defer further increasing SSRI, amenable to e-consult with Psychiatry for review of chart, additional suggestions  Trial of scheduled low dose Buspar     Adjustment disorder with anxiety  -     busPIRone (BUSPAR) 5 MG Tab; Take 1 tablet (5 mg total) by mouth 2 (two) times daily.  Dispense: 180 tablet; Refill: 0    Essential hypertension  Acceptable, continue current regimen  -     verapamiL (VERELAN) 240 MG C24P; Take 1 capsule (240 mg total) by mouth once daily.  Dispense: 90 capsule; Refill: 3  -     INV losartan (COZAAR) 100 MG Tab; Take 1 tablet (100 mg total) by mouth nightly.  Dispense: 90 tablet; Refill: 3  -     atorvastatin (LIPITOR) 80 MG tablet; Take 1 tablet (80 mg total) by mouth every evening.  Dispense: 90 tablet; Refill: 3    Type 2 diabetes mellitus with other specified complication, without long-term current use of insulin  11/2022 HgbA1C 5.1; controlled  Stop Metformin, continue Ozempic    Iron deficiency anemia, unspecified iron deficiency anemia type  Chronic, long-standing  No significant findings on last EGD/colonoscopy, due for repeat CRC screening  -     Ambulatory referral/consult to Endo Procedure ; Future; Expected date: 11/15/2022    Hyperlipidemia, mixed  Controlled, continue current regimen  -     atorvastatin (LIPITOR) 80 MG tablet; Take 1 tablet (80 mg total) by mouth every evening.  Dispense: 90 tablet; " Refill: 3    Gastroesophageal reflux disease without esophagitis  Controlled, continue current regimen  -     omeprazole (PRILOSEC) 20 MG capsule; TAKE 1 CAPSULE BY MOUTH  EVERY MORNING TO CONTROL  GERD  Dispense: 90 capsule; Refill: 3    Class 1 obesity due to excess calories with serious comorbidity and body mass index (BMI) of 32.0 to 32.9 in adult  30lb weight loss in past 16 months; continue Ozempic     HM as above.    Светлана Flynn MD  Department of Internal Medicine - [facility]  11/14/2022

## 2022-11-16 ENCOUNTER — PATIENT MESSAGE (OUTPATIENT)
Dept: INTERNAL MEDICINE | Facility: CLINIC | Age: 62
End: 2022-11-16
Payer: COMMERCIAL

## 2022-11-16 ENCOUNTER — TELEPHONE (OUTPATIENT)
Dept: INTERNAL MEDICINE | Facility: CLINIC | Age: 62
End: 2022-11-16
Payer: COMMERCIAL

## 2022-11-16 DIAGNOSIS — F33.41 RECURRENT MAJOR DEPRESSIVE DISORDER, IN PARTIAL REMISSION: ICD-10-CM

## 2022-11-16 NOTE — TELEPHONE ENCOUNTER
----- Message from Luis Flynn MD sent at 11/16/2022  8:01 AM CST -----  Regarding: Prozac  Please advise pt psychiatry reviewed chart. If pt finding Prozac helpful, can increase dose to maximum 80mg or see if Buspar recently started is helpful with irritability. Please let me know pt thoughts.

## 2022-11-23 RX ORDER — FLUOXETINE HYDROCHLORIDE 40 MG/1
80 CAPSULE ORAL EVERY MORNING
Qty: 180 CAPSULE | Refills: 3 | Status: SHIPPED | OUTPATIENT
Start: 2022-11-23 | End: 2023-07-06 | Stop reason: SDUPTHER

## 2022-11-23 NOTE — TELEPHONE ENCOUNTER
Fluoxetine increased from 60 to 80mg. Updated prescription sent to mail pharmacy. Can stop Buspar and begin taking higher dose of Fluoxetine.     If pt has any questions or concerns prior to his establish care appt 4/2023, please advise him to make an appt with MIRIAM Chen or MIRIAM Chapin.

## 2022-12-13 LAB
LEFT EYE DM RETINOPATHY: NEGATIVE
RIGHT EYE DM RETINOPATHY: NEGATIVE

## 2023-07-06 ENCOUNTER — OFFICE VISIT (OUTPATIENT)
Dept: PRIMARY CARE CLINIC | Facility: CLINIC | Age: 63
End: 2023-07-06
Payer: COMMERCIAL

## 2023-07-06 VITALS
WEIGHT: 215.63 LBS | HEART RATE: 100 BPM | HEIGHT: 67 IN | SYSTOLIC BLOOD PRESSURE: 136 MMHG | BODY MASS INDEX: 33.84 KG/M2 | DIASTOLIC BLOOD PRESSURE: 84 MMHG | OXYGEN SATURATION: 96 %

## 2023-07-06 DIAGNOSIS — E11.69 HYPERLIPIDEMIA DUE TO TYPE 2 DIABETES MELLITUS: ICD-10-CM

## 2023-07-06 DIAGNOSIS — Z00.00 ANNUAL PHYSICAL EXAM: Primary | ICD-10-CM

## 2023-07-06 DIAGNOSIS — K21.9 GASTROESOPHAGEAL REFLUX DISEASE WITHOUT ESOPHAGITIS: ICD-10-CM

## 2023-07-06 DIAGNOSIS — Z12.5 SCREENING PSA (PROSTATE SPECIFIC ANTIGEN): ICD-10-CM

## 2023-07-06 DIAGNOSIS — E66.01 MORBID OBESITY: ICD-10-CM

## 2023-07-06 DIAGNOSIS — E11.9 TYPE 2 DIABETES MELLITUS WITHOUT COMPLICATION, WITHOUT LONG-TERM CURRENT USE OF INSULIN: ICD-10-CM

## 2023-07-06 DIAGNOSIS — R41.3 MEMORY LOSS: ICD-10-CM

## 2023-07-06 DIAGNOSIS — E11.59 HYPERTENSION ASSOCIATED WITH DIABETES: ICD-10-CM

## 2023-07-06 DIAGNOSIS — Z12.12 ENCOUNTER FOR COLORECTAL CANCER SCREENING: ICD-10-CM

## 2023-07-06 DIAGNOSIS — Z12.11 ENCOUNTER FOR COLORECTAL CANCER SCREENING: ICD-10-CM

## 2023-07-06 DIAGNOSIS — F33.41 RECURRENT MAJOR DEPRESSIVE DISORDER, IN PARTIAL REMISSION: ICD-10-CM

## 2023-07-06 DIAGNOSIS — I15.2 HYPERTENSION ASSOCIATED WITH DIABETES: ICD-10-CM

## 2023-07-06 DIAGNOSIS — D50.9 IRON DEFICIENCY ANEMIA, UNSPECIFIED IRON DEFICIENCY ANEMIA TYPE: ICD-10-CM

## 2023-07-06 DIAGNOSIS — G47.33 OSA (OBSTRUCTIVE SLEEP APNEA): ICD-10-CM

## 2023-07-06 DIAGNOSIS — E78.5 HYPERLIPIDEMIA DUE TO TYPE 2 DIABETES MELLITUS: ICD-10-CM

## 2023-07-06 PROBLEM — E61.1 IRON DEFICIENCY: Status: RESOLVED | Noted: 2020-01-28 | Resolved: 2023-07-06

## 2023-07-06 PROBLEM — N13.8 BPH WITH URINARY OBSTRUCTION: Status: RESOLVED | Noted: 2020-07-29 | Resolved: 2023-07-06

## 2023-07-06 PROBLEM — N40.1 BPH WITH URINARY OBSTRUCTION: Status: RESOLVED | Noted: 2020-07-29 | Resolved: 2023-07-06

## 2023-07-06 PROBLEM — N52.01 ERECTILE DYSFUNCTION DUE TO ARTERIAL INSUFFICIENCY: Status: RESOLVED | Noted: 2020-07-29 | Resolved: 2023-07-06

## 2023-07-06 PROBLEM — H40.009 GLAUCOMA SUSPECT: Status: RESOLVED | Noted: 2019-05-01 | Resolved: 2023-07-06

## 2023-07-06 PROBLEM — N43.2 OTHER HYDROCELE: Status: RESOLVED | Noted: 2020-07-29 | Resolved: 2023-07-06

## 2023-07-06 PROBLEM — Z79.899 LONG-TERM USE OF HIGH-RISK MEDICATION: Status: RESOLVED | Noted: 2017-02-07 | Resolved: 2023-07-06

## 2023-07-06 PROBLEM — Z11.4 SCREENING FOR HIV WITHOUT PRESENCE OF RISK FACTORS: Status: RESOLVED | Noted: 2020-12-08 | Resolved: 2023-07-06

## 2023-07-06 PROCEDURE — 3075F PR MOST RECENT SYSTOLIC BLOOD PRESS GE 130-139MM HG: ICD-10-PCS | Mod: CPTII,S$GLB,, | Performed by: INTERNAL MEDICINE

## 2023-07-06 PROCEDURE — 3079F DIAST BP 80-89 MM HG: CPT | Mod: CPTII,S$GLB,, | Performed by: INTERNAL MEDICINE

## 2023-07-06 PROCEDURE — 1159F MED LIST DOCD IN RCRD: CPT | Mod: CPTII,S$GLB,, | Performed by: INTERNAL MEDICINE

## 2023-07-06 PROCEDURE — 3079F PR MOST RECENT DIASTOLIC BLOOD PRESSURE 80-89 MM HG: ICD-10-PCS | Mod: CPTII,S$GLB,, | Performed by: INTERNAL MEDICINE

## 2023-07-06 PROCEDURE — 3008F PR BODY MASS INDEX (BMI) DOCUMENTED: ICD-10-PCS | Mod: CPTII,S$GLB,, | Performed by: INTERNAL MEDICINE

## 2023-07-06 PROCEDURE — 1159F PR MEDICATION LIST DOCUMENTED IN MEDICAL RECORD: ICD-10-PCS | Mod: CPTII,S$GLB,, | Performed by: INTERNAL MEDICINE

## 2023-07-06 PROCEDURE — 4010F ACE/ARB THERAPY RXD/TAKEN: CPT | Mod: CPTII,S$GLB,, | Performed by: INTERNAL MEDICINE

## 2023-07-06 PROCEDURE — 4010F PR ACE/ARB THEARPY RXD/TAKEN: ICD-10-PCS | Mod: CPTII,S$GLB,, | Performed by: INTERNAL MEDICINE

## 2023-07-06 PROCEDURE — 99214 OFFICE O/P EST MOD 30 MIN: CPT | Mod: S$GLB,,, | Performed by: INTERNAL MEDICINE

## 2023-07-06 PROCEDURE — 99214 PR OFFICE/OUTPT VISIT, EST, LEVL IV, 30-39 MIN: ICD-10-PCS | Mod: S$GLB,,, | Performed by: INTERNAL MEDICINE

## 2023-07-06 PROCEDURE — 3008F BODY MASS INDEX DOCD: CPT | Mod: CPTII,S$GLB,, | Performed by: INTERNAL MEDICINE

## 2023-07-06 PROCEDURE — 99999 PR PBB SHADOW E&M-EST. PATIENT-LVL IV: ICD-10-PCS | Mod: PBBFAC,,, | Performed by: INTERNAL MEDICINE

## 2023-07-06 PROCEDURE — 99999 PR PBB SHADOW E&M-EST. PATIENT-LVL IV: CPT | Mod: PBBFAC,,, | Performed by: INTERNAL MEDICINE

## 2023-07-06 PROCEDURE — 3075F SYST BP GE 130 - 139MM HG: CPT | Mod: CPTII,S$GLB,, | Performed by: INTERNAL MEDICINE

## 2023-07-06 RX ORDER — SEMAGLUTIDE 1.34 MG/ML
1 INJECTION, SOLUTION SUBCUTANEOUS
Qty: 12 ML | Refills: 3 | Status: SHIPPED | OUTPATIENT
Start: 2023-07-06

## 2023-07-06 RX ORDER — SEMAGLUTIDE 1.34 MG/ML
1 INJECTION, SOLUTION SUBCUTANEOUS
COMMUNITY
End: 2023-07-06 | Stop reason: SDUPTHER

## 2023-07-06 RX ORDER — ATORVASTATIN CALCIUM 80 MG/1
80 TABLET, FILM COATED ORAL NIGHTLY
Qty: 90 TABLET | Refills: 3 | Status: SHIPPED | OUTPATIENT
Start: 2023-07-06

## 2023-07-06 RX ORDER — LOSARTAN POTASSIUM 100 MG/1
100 TABLET ORAL DAILY
Qty: 90 TABLET | Refills: 3 | Status: SHIPPED | OUTPATIENT
Start: 2023-07-06 | End: 2024-07-05

## 2023-07-06 RX ORDER — VERAPAMIL HYDROCHLORIDE 240 MG/1
240 CAPSULE, EXTENDED RELEASE ORAL DAILY
Qty: 90 CAPSULE | Refills: 3 | Status: SHIPPED | OUTPATIENT
Start: 2023-07-06

## 2023-07-06 RX ORDER — FLUOXETINE HYDROCHLORIDE 40 MG/1
80 CAPSULE ORAL EVERY MORNING
Qty: 180 CAPSULE | Refills: 3 | Status: SHIPPED | OUTPATIENT
Start: 2023-07-06

## 2023-07-06 RX ORDER — BUPROPION HYDROCHLORIDE 150 MG/1
150 TABLET ORAL DAILY
Qty: 90 TABLET | Refills: 3 | Status: SHIPPED | OUTPATIENT
Start: 2023-07-06 | End: 2023-08-07 | Stop reason: SDUPTHER

## 2023-07-06 RX ORDER — OMEPRAZOLE 20 MG/1
CAPSULE, DELAYED RELEASE ORAL
Qty: 90 CAPSULE | Refills: 3 | Status: SHIPPED | OUTPATIENT
Start: 2023-07-06

## 2023-07-06 NOTE — PROGRESS NOTES
[facility] Note    Chief Complaint      Chief Complaint   Patient presents with    Establish Care     History of Present Illness      David Nicole Jr. is a 63 y.o. male who presents today for Annual preventative visit.  Patient comes to appointment alone.  Optho: [place]    Having issues remembering where he put things. Has to ask people to repeat things. No issues with paying bills, forgetting faces, leaving water/stove on, getting lost while driving.    Problem List Items Addressed This Visit       Type 2 diabetes mellitus, without long-term current use of insulin    Current Assessment & Plan     A1C 5.1 in 11/2022, stable on Ozempic 1 mg. No hypoglycemia.  No issues with metformin in past.         Relevant Medications    semaglutide (OZEMPIC) 1 mg/dose (4 mg/3 mL)    Other Relevant Orders    Microalbumin/Creatinine Ratio, Urine    Hemoglobin A1C    Hyperlipidemia due to type 2 diabetes mellitus    Current Assessment & Plan     Stable on lipitor 80 mg, no myalgias.  The ASCVD Risk score (Ramona ANTUNEZ, et al., 2019) failed to calculate for the following reasons:    The valid total cholesterol range is 130 to 320 mg/dL           Relevant Medications    semaglutide (OZEMPIC) 1 mg/dose (4 mg/3 mL)    atorvastatin (LIPITOR) 80 MG tablet    Morbid obesity    Current Assessment & Plan     Has lost 42 pounds on Ozempic but gained some back.  Lowest weight was 205.  Not exercising.         Hypertension associated with diabetes    Current Assessment & Plan     BP controlled on verapamil 240 mg and losartan 100 mg daily. No CP/SOB/HA.         Relevant Medications    semaglutide (OZEMPIC) 1 mg/dose (4 mg/3 mL)    losartan (COZAAR) 100 MG tablet    verapamiL (VERELAN) 240 MG C24P    Iron deficiency anemia    Overview     EGD Colonoscopy 2017  SPECIMEN  1) Duodenum, rule out celiac.  2) GE junction, rule out Lott's esophagus.  3) Descending colon polyp.  FINAL PATHOLOGIC DIAGNOSIS  1. FRAGMENTS OF  NONNEOPLASTIC SMALL INTESTINAL MUCOSA WITH NO ACTIVE INFLAMMATION,  ULCERATION OR DYSPLASIA IDENTIFIED. THERE IS PRESERVATION OF THE VILLOUS ARCHITECTURE.  2. SMALL FRAGMENT OF BENIGN SQUAMOUS AND GASTRIC TYPE MUCOSA WITH NO ACTIVE  INFLAMMATION, EVIDENCE OF SPECIALIZED EPITHELIUM OR DYSPLASIA IDENTIFIED.  3. HYPERPLASTIC POLYP  Diagnosed by: Alcides Coppola M.D.  (Electronically Signed: 2017-02-15 09:17:57)         Relevant Orders    Iron and TIBC    Ferritin    Gastroesophageal reflux disease without esophagitis    Current Assessment & Plan     Stable on prilosec, no issues at present.         Relevant Medications    omeprazole (PRILOSEC) 20 MG capsule    Recurrent major depressive disorder, in partial remission    Current Assessment & Plan     Stable on prozac 80 mg, no SI/HI/hawk attacks.         Relevant Medications    FLUoxetine 40 MG capsule    buPROPion (WELLBUTRIN XL) 150 MG TB24 tablet    HILARIA (obstructive sleep apnea)    Current Assessment & Plan     Tried 2 different face masks, didn't tolerate CPAP.  Still snores but losing weight has helped.           Other Visit Diagnoses       Annual physical exam    -  Primary    Relevant Orders    CBC Auto Differential    Comprehensive Metabolic Panel    Lipid Panel    Microalbumin/Creatinine Ratio, Urine    Hemoglobin A1C    Encounter for colorectal cancer screening        Relevant Orders    Ambulatory referral/consult to Endo Procedure     Screening PSA (prostate specific antigen)        Relevant Orders    PSA, SCREENING    Memory loss        Relevant Orders    TSH    VITAMIN B12    RPR            Health Maintenance   Topic Date Due    PROSTATE-SPECIFIC ANTIGEN  05/02/2021    Eye Exam  08/17/2021    Lipid Panel  04/23/2023    Hemoglobin A1c  05/12/2023    Low Dose Statin  07/06/2024    Foot Exam  07/06/2024    TETANUS VACCINE  01/17/2027    Hepatitis C Screening  Completed       Past Medical History:   Diagnosis Date    Anxiety     Arthritis     Baker's cyst      Diabetes mellitus, type 2     H/O peptic ulcer 02/27/2018    H/O type 2 diabetes mellitus 02/27/2018    Hyperlipidemia     Hypertension     Insomnia     Intractable migraine with aura with status migrainosus 08/17/2016    GONB (Greater Occipital Nerve Block):2016    Joint pain     Knee pain     HILARIA (obstructive sleep apnea)     Prediabetes        Past Surgical History:   Procedure Laterality Date    BACK SURGERY      COLONOSCOPY N/A 2/13/2017    Procedure: COLONOSCOPY;  Surgeon: Rich James MD;  Location: [address]);  Service: Endoscopy;  Laterality: N/A;    SPINE SURGERY      TONSILLECTOMY      VASECTOMY      WISDOM TOOTH EXTRACTION         family history includes Headaches in his mother; Heart disease in his father.    Social History     Tobacco Use    Smoking status: Never    Smokeless tobacco: Never   Substance Use Topics    Alcohol use: Yes     Comment: occasional     Drug use: No       Review of Systems   Constitutional:  Negative for chills and fever.   Respiratory:  Negative for cough and shortness of breath.    Cardiovascular:  Negative for chest pain and palpitations.   Gastrointestinal:  Negative for constipation, diarrhea, nausea and vomiting.   Genitourinary:  Negative for dysuria and hematuria.   Musculoskeletal:  Negative for falls.   Neurological:  Negative for headaches.      Outpatient Encounter Medications as of 7/6/2023   Medication Sig Dispense Refill    ascorbic acid, vitamin C, (VITAMIN C) 100 MG tablet Take 100 mg by mouth once daily.      CYANOCOBALAMIN, VITAMIN B-12, (VITAMIN B-12 ORAL) Take by mouth.      ferrous sulfate 325 (65 FE) MG EC tablet Take 325 mg by mouth once daily.      fluticasone propionate (FLONASE) 50 mcg/actuation nasal spray 2 sprays (100 mcg total) by Each Nostril route once daily. 48 g 3    multivitamin (THERAGRAN) per tablet Take 1 tablet by mouth once daily.      ondansetron (ZOFRAN-ODT) 8 MG TbDL Take 1 tablet (8 mg total) by mouth every 24 hours as  "needed (nausea). 30 tablet 1    vitamin D (VITAMIN D3) 1000 units Tab Take 1,000 Units by mouth once daily.      [DISCONTINUED] atorvastatin (LIPITOR) 80 MG tablet Take 1 tablet (80 mg total) by mouth every evening. 90 tablet 3    [DISCONTINUED] FLUoxetine 40 MG capsule Take 2 capsules (80 mg total) by mouth every morning. 180 capsule 3    [DISCONTINUED] INV losartan (COZAAR) 100 MG Tab Take 1 tablet (100 mg total) by mouth nightly. 90 tablet 3    [DISCONTINUED] omeprazole (PRILOSEC) 20 MG capsule TAKE 1 CAPSULE BY MOUTH  EVERY MORNING TO CONTROL  GERD 90 capsule 3    [DISCONTINUED] semaglutide (OZEMPIC) 1 mg/dose (4 mg/3 mL) Inject 1 mg into the skin every 7 days.      [DISCONTINUED] verapamiL (VERELAN) 240 MG C24P Take 1 capsule (240 mg total) by mouth once daily. 90 capsule 3    atorvastatin (LIPITOR) 80 MG tablet Take 1 tablet (80 mg total) by mouth every evening. 90 tablet 3    buPROPion (WELLBUTRIN XL) 150 MG TB24 tablet Take 1 tablet (150 mg total) by mouth once daily. 90 tablet 3    FLUoxetine 40 MG capsule Take 2 capsules (80 mg total) by mouth every morning. 180 capsule 3    losartan (COZAAR) 100 MG tablet Take 1 tablet (100 mg total) by mouth once daily. 90 tablet 3    omeprazole (PRILOSEC) 20 MG capsule TAKE 1 CAPSULE BY MOUTH  EVERY MORNING TO CONTROL  GERD 90 capsule 3    semaglutide (OZEMPIC) 1 mg/dose (4 mg/3 mL) Inject 1 mg into the skin every 7 days. 12 mL 3    verapamiL (VERELAN) 240 MG C24P Take 1 capsule (240 mg total) by mouth once daily. 90 capsule 3    [DISCONTINUED] busPIRone (BUSPAR) 5 MG Tab Take 1 tablet (5 mg total) by mouth 2 (two) times daily. (Patient not taking: Reported on 7/6/2023) 180 tablet 0     No facility-administered encounter medications on file as of 7/6/2023.        Review of patient's allergies indicates:  No Known Allergies    Physical Exam      Vital Signs  Pulse: 100  SpO2: 96 %  BP: 136/84  Pain Score: 0-No pain  Height and Weight  Height: 5' 7" (170.2 cm)  Weight: " 97.8 kg (215 lb 9.8 oz)  BSA (Calculated - sq m): 2.15 sq meters  BMI (Calculated): 33.8  Weight in (lb) to have BMI = 25: 159.3]    Physical Exam  Constitutional:       Appearance: He is well-developed.   HENT:      Head: Normocephalic and atraumatic.   Neck:      Thyroid: No thyromegaly.   Cardiovascular:      Rate and Rhythm: Normal rate and regular rhythm.      Heart sounds: No murmur heard.  Pulmonary:      Effort: Pulmonary effort is normal. No respiratory distress.      Breath sounds: Normal breath sounds.   Abdominal:      General: There is no distension.      Palpations: Abdomen is soft.      Tenderness: There is no abdominal tenderness.   Skin:     General: Skin is warm and dry.   Neurological:      Mental Status: He is alert and oriented to person, place, and time.   Psychiatric:         Behavior: Behavior normal.        Laboratory:  CBC:  No results for input(s): WBC, RBC, HGB, HCT, PLT, MCV, MCH, MCHC in the last 2160 hours.  CMP:  No results for input(s): GLU, CALCIUM, ALBUMIN, PROT, NA, K, CO2, CL, BUN, ALKPHOS, ALT, AST, BILITOT in the last 2160 hours.    Invalid input(s): CREATININ  URINALYSIS:  No results for input(s): COLORU, CLARITYU, SPECGRAV, PHUR, PROTEINUA, GLUCOSEU, BILIRUBINCON, BLOODU, WBCU, RBCU, BACTERIA, MUCUS, NITRITE, LEUKOCYTESUR, UROBILINOGEN, HYALINECASTS in the last 2160 hours.   LIPIDS:  No results for input(s): TSH, HDL, CHOL, TRIG, LDLCALC, CHOLHDL, NONHDLCHOL, TOTALCHOLEST in the last 2160 hours.  TSH:  No results for input(s): TSH in the last 2160 hours.  A1C:  No results for input(s): HGBA1C in the last 2160 hours.    Radiology:  No results found in the last 30 days.     Assessment/Plan     David Nicole Jr. is a 63 y.o.male with:    1. Annual physical exam  - CBC Auto Differential; Future  - Comprehensive Metabolic Panel; Future  - Lipid Panel; Future  - Microalbumin/Creatinine Ratio, Urine; Future  - Hemoglobin A1C; Future    2. Type 2 diabetes mellitus without  complication, without long-term current use of insulin  - Microalbumin/Creatinine Ratio, Urine; Future  - Hemoglobin A1C; Future  - semaglutide (OZEMPIC) 1 mg/dose (4 mg/3 mL); Inject 1 mg into the skin every 7 days.  Dispense: 12 mL; Refill: 3    3. Hypertension associated with diabetes  - losartan (COZAAR) 100 MG tablet; Take 1 tablet (100 mg total) by mouth once daily.  Dispense: 90 tablet; Refill: 3  - verapamiL (VERELAN) 240 MG C24P; Take 1 capsule (240 mg total) by mouth once daily.  Dispense: 90 capsule; Refill: 3    4. Hyperlipidemia due to type 2 diabetes mellitus  - atorvastatin (LIPITOR) 80 MG tablet; Take 1 tablet (80 mg total) by mouth every evening.  Dispense: 90 tablet; Refill: 3    5. Gastroesophageal reflux disease without esophagitis  - omeprazole (PRILOSEC) 20 MG capsule; TAKE 1 CAPSULE BY MOUTH  EVERY MORNING TO CONTROL  GERD  Dispense: 90 capsule; Refill: 3    6. Recurrent major depressive disorder, in partial remission  - FLUoxetine 40 MG capsule; Take 2 capsules (80 mg total) by mouth every morning.  Dispense: 180 capsule; Refill: 3  - buPROPion (WELLBUTRIN XL) 150 MG TB24 tablet; Take 1 tablet (150 mg total) by mouth once daily.  Dispense: 90 tablet; Refill: 3    7. Morbid obesity    8. HILARIA (obstructive sleep apnea)    9. Iron deficiency anemia, unspecified iron deficiency anemia type  - Iron and TIBC; Future  - Ferritin; Future    10. Encounter for colorectal cancer screening  - Ambulatory referral/consult to Endo Procedure ; Future    11. Screening PSA (prostate specific antigen)  - PSA, SCREENING; Future    12. Memory loss  - TSH; Future  - VITAMIN B12; Future  - RPR; Future    -start wellbutrin 150 mg daily   -labs and MCAR ordered  -get eye exam from Dr. Poe  -Continue current medications and maintain follow up with specialists.    -Follow up in about 4 weeks (around 8/3/2023) for Virtual Visit.       Estelle Parker MD  [facility]

## 2023-07-06 NOTE — ASSESSMENT & PLAN NOTE
Stable on lipitor 80 mg, no myalgias.  The ASCVD Risk score (Ramona ANTUNEZ, et al., 2019) failed to calculate for the following reasons:    The valid total cholesterol range is 130 to 320 mg/dL
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
## 2023-07-07 ENCOUNTER — LAB VISIT (OUTPATIENT)
Dept: LAB | Facility: HOSPITAL | Age: 63
End: 2023-07-07
Attending: INTERNAL MEDICINE
Payer: COMMERCIAL

## 2023-07-07 DIAGNOSIS — E11.9 TYPE 2 DIABETES MELLITUS WITHOUT COMPLICATION, WITHOUT LONG-TERM CURRENT USE OF INSULIN: ICD-10-CM

## 2023-07-07 DIAGNOSIS — Z00.00 ANNUAL PHYSICAL EXAM: ICD-10-CM

## 2023-07-07 DIAGNOSIS — Z12.5 SCREENING PSA (PROSTATE SPECIFIC ANTIGEN): ICD-10-CM

## 2023-07-07 DIAGNOSIS — D50.9 IRON DEFICIENCY ANEMIA, UNSPECIFIED IRON DEFICIENCY ANEMIA TYPE: ICD-10-CM

## 2023-07-07 DIAGNOSIS — R41.3 MEMORY LOSS: ICD-10-CM

## 2023-07-07 LAB
ALBUMIN SERPL BCP-MCNC: 3.8 G/DL (ref 3.5–5.2)
ALP SERPL-CCNC: 100 U/L (ref 55–135)
ALT SERPL W/O P-5'-P-CCNC: 18 U/L (ref 10–44)
ANION GAP SERPL CALC-SCNC: 9 MMOL/L (ref 8–16)
AST SERPL-CCNC: 21 U/L (ref 10–40)
BASOPHILS # BLD AUTO: 0.04 K/UL (ref 0–0.2)
BASOPHILS NFR BLD: 0.4 % (ref 0–1.9)
BILIRUB SERPL-MCNC: 0.8 MG/DL (ref 0.1–1)
BUN SERPL-MCNC: 14 MG/DL (ref 8–23)
CALCIUM SERPL-MCNC: 9.2 MG/DL (ref 8.7–10.5)
CHLORIDE SERPL-SCNC: 101 MMOL/L (ref 95–110)
CHOLEST SERPL-MCNC: 106 MG/DL (ref 120–199)
CHOLEST/HDLC SERPL: 2.9 {RATIO} (ref 2–5)
CO2 SERPL-SCNC: 26 MMOL/L (ref 23–29)
COMPLEXED PSA SERPL-MCNC: 0.85 NG/ML (ref 0–4)
CREAT SERPL-MCNC: 0.8 MG/DL (ref 0.5–1.4)
DIFFERENTIAL METHOD: ABNORMAL
EOSINOPHIL # BLD AUTO: 0.3 K/UL (ref 0–0.5)
EOSINOPHIL NFR BLD: 2.9 % (ref 0–8)
ERYTHROCYTE [DISTWIDTH] IN BLOOD BY AUTOMATED COUNT: 14.9 % (ref 11.5–14.5)
EST. GFR  (NO RACE VARIABLE): >60 ML/MIN/1.73 M^2
ESTIMATED AVG GLUCOSE: 105 MG/DL (ref 68–131)
GLUCOSE SERPL-MCNC: 80 MG/DL (ref 70–110)
HBA1C MFR BLD: 5.3 % (ref 4–5.6)
HCT VFR BLD AUTO: 46.6 % (ref 40–54)
HDLC SERPL-MCNC: 36 MG/DL (ref 40–75)
HDLC SERPL: 34 % (ref 20–50)
HGB BLD-MCNC: 15 G/DL (ref 14–18)
IMM GRANULOCYTES # BLD AUTO: 0.05 K/UL (ref 0–0.04)
IMM GRANULOCYTES NFR BLD AUTO: 0.5 % (ref 0–0.5)
IRON SERPL-MCNC: 46 UG/DL (ref 45–160)
LDLC SERPL CALC-MCNC: 59.4 MG/DL (ref 63–159)
LYMPHOCYTES # BLD AUTO: 1.3 K/UL (ref 1–4.8)
LYMPHOCYTES NFR BLD: 14.1 % (ref 18–48)
MCH RBC QN AUTO: 29 PG (ref 27–31)
MCHC RBC AUTO-ENTMCNC: 32.2 G/DL (ref 32–36)
MCV RBC AUTO: 90 FL (ref 82–98)
MONOCYTES # BLD AUTO: 0.8 K/UL (ref 0.3–1)
MONOCYTES NFR BLD: 8.8 % (ref 4–15)
NEUTROPHILS # BLD AUTO: 6.9 K/UL (ref 1.8–7.7)
NEUTROPHILS NFR BLD: 73.3 % (ref 38–73)
NONHDLC SERPL-MCNC: 70 MG/DL
NRBC BLD-RTO: 0 /100 WBC
PLATELET # BLD AUTO: 287 K/UL (ref 150–450)
PMV BLD AUTO: 10.8 FL (ref 9.2–12.9)
POTASSIUM SERPL-SCNC: 4.3 MMOL/L (ref 3.5–5.1)
PROT SERPL-MCNC: 6.9 G/DL (ref 6–8.4)
RBC # BLD AUTO: 5.17 M/UL (ref 4.6–6.2)
SATURATED IRON: 11 % (ref 20–50)
SODIUM SERPL-SCNC: 136 MMOL/L (ref 136–145)
TOTAL IRON BINDING CAPACITY: 410 UG/DL (ref 250–450)
TRANSFERRIN SERPL-MCNC: 277 MG/DL (ref 200–375)
TRIGL SERPL-MCNC: 53 MG/DL (ref 30–150)
TSH SERPL DL<=0.005 MIU/L-ACNC: 1.37 UIU/ML (ref 0.4–4)
VIT B12 SERPL-MCNC: 504 PG/ML (ref 210–950)
WBC # BLD AUTO: 9.47 K/UL (ref 3.9–12.7)

## 2023-07-07 PROCEDURE — 85025 COMPLETE CBC W/AUTO DIFF WBC: CPT | Performed by: INTERNAL MEDICINE

## 2023-07-07 PROCEDURE — 82607 VITAMIN B-12: CPT | Performed by: INTERNAL MEDICINE

## 2023-07-07 PROCEDURE — 83036 HEMOGLOBIN GLYCOSYLATED A1C: CPT | Performed by: INTERNAL MEDICINE

## 2023-07-07 PROCEDURE — 86592 SYPHILIS TEST NON-TREP QUAL: CPT | Performed by: INTERNAL MEDICINE

## 2023-07-07 PROCEDURE — 80053 COMPREHEN METABOLIC PANEL: CPT | Performed by: INTERNAL MEDICINE

## 2023-07-07 PROCEDURE — 84443 ASSAY THYROID STIM HORMONE: CPT | Performed by: INTERNAL MEDICINE

## 2023-07-07 PROCEDURE — 84466 ASSAY OF TRANSFERRIN: CPT | Performed by: INTERNAL MEDICINE

## 2023-07-07 PROCEDURE — 36415 COLL VENOUS BLD VENIPUNCTURE: CPT | Performed by: INTERNAL MEDICINE

## 2023-07-07 PROCEDURE — 82728 ASSAY OF FERRITIN: CPT | Performed by: INTERNAL MEDICINE

## 2023-07-07 PROCEDURE — 84153 ASSAY OF PSA TOTAL: CPT | Performed by: INTERNAL MEDICINE

## 2023-07-07 PROCEDURE — 80061 LIPID PANEL: CPT | Performed by: INTERNAL MEDICINE

## 2023-07-08 LAB
FERRITIN SERPL-MCNC: 17 NG/ML (ref 20–300)
RPR SER QL: NORMAL

## 2023-07-27 DIAGNOSIS — E11.9 TYPE 2 DIABETES MELLITUS WITHOUT COMPLICATION, UNSPECIFIED WHETHER LONG TERM INSULIN USE: ICD-10-CM

## 2023-08-01 ENCOUNTER — PATIENT MESSAGE (OUTPATIENT)
Dept: ADMINISTRATIVE | Facility: HOSPITAL | Age: 63
End: 2023-08-01
Payer: COMMERCIAL

## 2023-08-07 ENCOUNTER — OFFICE VISIT (OUTPATIENT)
Dept: PRIMARY CARE CLINIC | Facility: CLINIC | Age: 63
End: 2023-08-07
Payer: COMMERCIAL

## 2023-08-07 DIAGNOSIS — E11.9 TYPE 2 DIABETES MELLITUS WITHOUT COMPLICATION, WITHOUT LONG-TERM CURRENT USE OF INSULIN: ICD-10-CM

## 2023-08-07 DIAGNOSIS — F33.41 RECURRENT MAJOR DEPRESSIVE DISORDER, IN PARTIAL REMISSION: Primary | ICD-10-CM

## 2023-08-07 PROCEDURE — 3044F PR MOST RECENT HEMOGLOBIN A1C LEVEL <7.0%: ICD-10-PCS | Mod: CPTII,95,, | Performed by: INTERNAL MEDICINE

## 2023-08-07 PROCEDURE — 4010F ACE/ARB THERAPY RXD/TAKEN: CPT | Mod: CPTII,95,, | Performed by: INTERNAL MEDICINE

## 2023-08-07 PROCEDURE — 3066F PR DOCUMENTATION OF TREATMENT FOR NEPHROPATHY: ICD-10-PCS | Mod: CPTII,95,, | Performed by: INTERNAL MEDICINE

## 2023-08-07 PROCEDURE — 4010F PR ACE/ARB THEARPY RXD/TAKEN: ICD-10-PCS | Mod: CPTII,95,, | Performed by: INTERNAL MEDICINE

## 2023-08-07 PROCEDURE — 3066F NEPHROPATHY DOC TX: CPT | Mod: CPTII,95,, | Performed by: INTERNAL MEDICINE

## 2023-08-07 PROCEDURE — 1160F RVW MEDS BY RX/DR IN RCRD: CPT | Mod: CPTII,95,, | Performed by: INTERNAL MEDICINE

## 2023-08-07 PROCEDURE — 1159F MED LIST DOCD IN RCRD: CPT | Mod: CPTII,95,, | Performed by: INTERNAL MEDICINE

## 2023-08-07 PROCEDURE — 3044F HG A1C LEVEL LT 7.0%: CPT | Mod: CPTII,95,, | Performed by: INTERNAL MEDICINE

## 2023-08-07 PROCEDURE — 1160F PR REVIEW ALL MEDS BY PRESCRIBER/CLIN PHARMACIST DOCUMENTED: ICD-10-PCS | Mod: CPTII,95,, | Performed by: INTERNAL MEDICINE

## 2023-08-07 PROCEDURE — 1159F PR MEDICATION LIST DOCUMENTED IN MEDICAL RECORD: ICD-10-PCS | Mod: CPTII,95,, | Performed by: INTERNAL MEDICINE

## 2023-08-07 PROCEDURE — 99214 PR OFFICE/OUTPT VISIT, EST, LEVL IV, 30-39 MIN: ICD-10-PCS | Mod: 95,,, | Performed by: INTERNAL MEDICINE

## 2023-08-07 PROCEDURE — 99214 OFFICE O/P EST MOD 30 MIN: CPT | Mod: 95,,, | Performed by: INTERNAL MEDICINE

## 2023-08-07 PROCEDURE — 3061F NEG MICROALBUMINURIA REV: CPT | Mod: CPTII,95,, | Performed by: INTERNAL MEDICINE

## 2023-08-07 PROCEDURE — 3061F PR NEG MICROALBUMINURIA RESULT DOCUMENTED/REVIEW: ICD-10-PCS | Mod: CPTII,95,, | Performed by: INTERNAL MEDICINE

## 2023-08-07 RX ORDER — BUPROPION HYDROCHLORIDE 300 MG/1
300 TABLET ORAL DAILY
Qty: 90 TABLET | Refills: 3 | Status: SHIPPED | OUTPATIENT
Start: 2023-08-07 | End: 2024-08-06

## 2023-08-07 NOTE — PROGRESS NOTES
The patient location is: home  The chief complaint leading to consultation is: depression    Visit type: audiovisual    Face to Face time with patient: 10 minutes  10 minutes of total time spent on the encounter, which includes face to face time and non-face to face time preparing to see the patient (eg, review of tests), Obtaining and/or reviewing separately obtained history, Documenting clinical information in the electronic or other health record, Independently interpreting results (not separately reported) and communicating results to the patient/family/caregiver, or Care coordination (not separately reported).     Each patient to whom he or she provides medical services by telemedicine is:  (1) informed of the relationship between the physician and patient and the respective role of any other health care provider with respect to management of the patient; and (2) notified that he or she may decline to receive medical services by telemedicine and may withdraw from such care at any time.    [facility] Note    Chief Complaint      Chief Complaint   Patient presents with    Depression     History of Present Illness      David Nicole Jr. is a 63 y.o. male who presents today for depression mediation f/u.  Patient comes to appointment alone.  Optho: Lui    Problem List Items Addressed This Visit       Recurrent major depressive disorder, in partial remission - Primary    Current Assessment & Plan     Stable on prozac 80 mg, wellbutrin added last visit, no SI/HI/panic attacks.  Felt a little calmer a week after starting the medication.         Relevant Medications    buPROPion (WELLBUTRIN XL) 300 MG 24 hr tablet         Health Maintenance   Topic Date Due    Eye Exam  08/17/2021    Hemoglobin A1c  01/07/2024    Low Dose Statin  07/06/2024    Foot Exam  07/06/2024    PROSTATE-SPECIFIC ANTIGEN  07/07/2024    Lipid Panel  07/07/2024    TETANUS VACCINE  01/17/2027    Hepatitis C Screening  Completed        Past Medical History:   Diagnosis Date    Anxiety     Arthritis     Baker's cyst     Diabetes mellitus, type 2     H/O peptic ulcer 02/27/2018    H/O type 2 diabetes mellitus 02/27/2018    Hyperlipidemia     Hypertension     Insomnia     Intractable migraine with aura with status migrainosus 08/17/2016    GONB (Greater Occipital Nerve Block):2016    Joint pain     Knee pain     HILARIA (obstructive sleep apnea)     Prediabetes        Past Surgical History:   Procedure Laterality Date    BACK SURGERY      COLONOSCOPY N/A 2/13/2017    Procedure: COLONOSCOPY;  Surgeon: Rich James MD;  Location: [address]);  Service: Endoscopy;  Laterality: N/A;    SPINE SURGERY      TONSILLECTOMY      VASECTOMY      WISDOM TOOTH EXTRACTION         family history includes Headaches in his mother; Heart disease in his father.    Social History     Tobacco Use    Smoking status: Never    Smokeless tobacco: Never   Substance Use Topics    Alcohol use: Yes     Comment: occasional     Drug use: No       Review of Systems   Constitutional:  Negative for chills and fever.   HENT:  Negative for hearing loss.    Eyes:  Negative for discharge.   Respiratory:  Negative for cough, shortness of breath and wheezing.    Cardiovascular:  Negative for chest pain and palpitations.   Gastrointestinal:  Negative for blood in stool, constipation, diarrhea, nausea and vomiting.   Genitourinary:  Negative for dysuria, hematuria and urgency.   Musculoskeletal:  Negative for falls and neck pain.   Neurological:  Negative for weakness and headaches.   Endo/Heme/Allergies:  Negative for polydipsia.        Outpatient Encounter Medications as of 8/7/2023   Medication Sig Dispense Refill    ascorbic acid, vitamin C, (VITAMIN C) 100 MG tablet Take 100 mg by mouth once daily.      atorvastatin (LIPITOR) 80 MG tablet Take 1 tablet (80 mg total) by mouth every evening. 90 tablet 3    buPROPion (WELLBUTRIN XL) 300 MG 24 hr tablet Take 1 tablet (300 mg total)  by mouth once daily. 90 tablet 3    CYANOCOBALAMIN, VITAMIN B-12, (VITAMIN B-12 ORAL) Take by mouth.      ferrous sulfate 325 (65 FE) MG EC tablet Take 325 mg by mouth once daily.      FLUoxetine 40 MG capsule Take 2 capsules (80 mg total) by mouth every morning. 180 capsule 3    fluticasone propionate (FLONASE) 50 mcg/actuation nasal spray 2 sprays (100 mcg total) by Each Nostril route once daily. 48 g 3    losartan (COZAAR) 100 MG tablet Take 1 tablet (100 mg total) by mouth once daily. 90 tablet 3    multivitamin (THERAGRAN) per tablet Take 1 tablet by mouth once daily.      omeprazole (PRILOSEC) 20 MG capsule TAKE 1 CAPSULE BY MOUTH  EVERY MORNING TO CONTROL  GERD 90 capsule 3    ondansetron (ZOFRAN-ODT) 8 MG TbDL Take 1 tablet (8 mg total) by mouth every 24 hours as needed (nausea). 30 tablet 1    semaglutide (OZEMPIC) 1 mg/dose (4 mg/3 mL) Inject 1 mg into the skin every 7 days. 12 mL 3    verapamiL (VERELAN) 240 MG C24P Take 1 capsule (240 mg total) by mouth once daily. 90 capsule 3    vitamin D (VITAMIN D3) 1000 units Tab Take 1,000 Units by mouth once daily.      [DISCONTINUED] buPROPion (WELLBUTRIN XL) 150 MG TB24 tablet Take 1 tablet (150 mg total) by mouth once daily. 90 tablet 3     No facility-administered encounter medications on file as of 8/7/2023.        Review of patient's allergies indicates:  No Known Allergies    Physical Exam       ]    Physical Exam  Constitutional:       General: He is not in acute distress.     Appearance: He is well-developed.   HENT:      Head: Normocephalic and atraumatic.   Pulmonary:      Effort: Pulmonary effort is normal.   Musculoskeletal:      Cervical back: Normal range of motion.   Skin:     Findings: No rash.   Neurological:      Mental Status: He is alert and oriented to person, place, and time.      Coordination: Coordination normal.   Psychiatric:         Behavior: Behavior normal.         Thought Content: Thought content normal.         Judgment: Judgment  "normal.          Laboratory:  CBC:  Recent Labs   Lab Result Units 07/07/23  0705   WBC K/uL 9.47   RBC M/uL 5.17   Hemoglobin g/dL 15.0   Hematocrit % 46.6   Platelets K/uL 287   MCV fL 90   MCH pg 29.0   MCHC g/dL 32.2     CMP:  Recent Labs   Lab Result Units 07/07/23  0705   Glucose mg/dL 80   Calcium mg/dL 9.2   Albumin g/dL 3.8   Total Protein g/dL 6.9   Sodium mmol/L 136   Potassium mmol/L 4.3   CO2 mmol/L 26   Chloride mmol/L 101   BUN mg/dL 14   Alkaline Phosphatase U/L 100   ALT U/L 18   AST U/L 21   Total Bilirubin mg/dL 0.8     URINALYSIS:  No results for input(s): "COLORU", "CLARITYU", "SPECGRAV", "PHUR", "PROTEINUA", "GLUCOSEU", "BILIRUBINCON", "BLOODU", "WBCU", "RBCU", "BACTERIA", "MUCUS", "NITRITE", "LEUKOCYTESUR", "UROBILINOGEN", "HYALINECASTS" in the last 2160 hours.   LIPIDS:  Recent Labs   Lab Result Units 07/07/23  0705   TSH uIU/mL 1.370   HDL mg/dL 36*   Cholesterol mg/dL 106*   Triglycerides mg/dL 53   LDL Cholesterol mg/dL 59.4*   HDL/Cholesterol Ratio % 34.0   Non-HDL Cholesterol mg/dL 70   Total Cholesterol/HDL Ratio  2.9     TSH:  Recent Labs   Lab Result Units 07/07/23  0705   TSH uIU/mL 1.370     A1C:  Recent Labs   Lab Result Units 07/07/23  0705   Hemoglobin A1C % 5.3       Radiology:  No results found in the last 30 days.     Assessment/Plan     David Nicole Jr. is a 63 y.o.male with:    1. Recurrent major depressive disorder, in partial remission  - buPROPion (WELLBUTRIN XL) 300 MG 24 hr tablet; Take 1 tablet (300 mg total) by mouth once daily.  Dispense: 90 tablet; Refill: 3      -increase wellbutrin 300 mg daily, will message me in a few weeks to let me know how he is feeling  -get eye exam from Dr. Poe  -Continue current medications and maintain follow up with specialists.    -Follow up in about 5 months (around 1/7/2024) for follow up of medical problems.       Estelle Parker MD  [facility]                  Answers submitted by the patient for this " visit:  Review of Systems Questionnaire (Submitted on 8/6/2023)  activity change: No  unexpected weight change: No  rhinorrhea: No  trouble swallowing: No  visual disturbance: No  chest tightness: No  polyuria: No  difficulty urinating: No  joint swelling: No  arthralgias: No  confusion: No  dysphoric mood: No

## 2023-08-07 NOTE — ASSESSMENT & PLAN NOTE
Stable on prozac 80 mg, wellbutrin added last visit, no SI/HI/panic attacks.  Felt a little calmer a week after starting the medication.

## 2023-09-12 ENCOUNTER — TELEPHONE (OUTPATIENT)
Dept: PRIMARY CARE CLINIC | Facility: CLINIC | Age: 63
End: 2023-09-12
Payer: COMMERCIAL

## 2023-09-12 NOTE — LETTER
AUTHORIZATION FOR RELEASE OF   CONFIDENTIAL INFORMATION    Dear Dr. Poe,    We are seeing David Nicole Jr., date of birth 1960, in the clinic at [facility]. Estelle Parker MD is the patient's PCP. David Nicole Jr. has an outstanding lab/procedure at the time we reviewed his chart. In order to help keep his health information updated, he has authorized us to request the following medical record(s):        (  )  MAMMOGRAM                                      (  )  COLONOSCOPY      (  )  PAP SMEAR                                          (  )  OUTSIDE LAB RESULTS     (  )  DEXA SCAN                                          ( X ) DM EYE EXAM            (  )  FOOT EXAM                                          (  )  ENTIRE RECORD     (  )  OUTSIDE IMMUNIZATIONS                 (  )  _______________         Please fax records to [facility], Jordan, Jenna C., MD, [phone]     If you have any questions, please contact Kell at [phone].           Patient Name: David Nicole Jr.  : 1960  Patient Phone #: [phone]

## 2023-09-12 NOTE — TELEPHONE ENCOUNTER
----- Message from Estelle Parker MD sent at 9/12/2023 12:07 PM CDT -----  Regarding: [facility]  Please get eye exam from Dr. Poe.

## 2023-09-18 ENCOUNTER — OFFICE VISIT (OUTPATIENT)
Dept: INTERNAL MEDICINE | Facility: CLINIC | Age: 63
End: 2023-09-18
Payer: COMMERCIAL

## 2023-09-18 VITALS
SYSTOLIC BLOOD PRESSURE: 130 MMHG | HEART RATE: 105 BPM | OXYGEN SATURATION: 98 % | HEIGHT: 67 IN | DIASTOLIC BLOOD PRESSURE: 80 MMHG | BODY MASS INDEX: 32.01 KG/M2 | WEIGHT: 203.94 LBS

## 2023-09-18 DIAGNOSIS — H92.09 EAR ACHE: ICD-10-CM

## 2023-09-18 DIAGNOSIS — H61.20 IMPACTED CERUMEN, UNSPECIFIED LATERALITY: ICD-10-CM

## 2023-09-18 DIAGNOSIS — R05.8 PRODUCTIVE COUGH: ICD-10-CM

## 2023-09-18 DIAGNOSIS — J32.1 CHRONIC FRONTAL SINUSITIS: Primary | ICD-10-CM

## 2023-09-18 PROCEDURE — 3066F PR DOCUMENTATION OF TREATMENT FOR NEPHROPATHY: ICD-10-PCS | Mod: CPTII,S$GLB,, | Performed by: INTERNAL MEDICINE

## 2023-09-18 PROCEDURE — 3008F PR BODY MASS INDEX (BMI) DOCUMENTED: ICD-10-PCS | Mod: CPTII,S$GLB,, | Performed by: INTERNAL MEDICINE

## 2023-09-18 PROCEDURE — 1160F PR REVIEW ALL MEDS BY PRESCRIBER/CLIN PHARMACIST DOCUMENTED: ICD-10-PCS | Mod: CPTII,S$GLB,, | Performed by: INTERNAL MEDICINE

## 2023-09-18 PROCEDURE — 1159F PR MEDICATION LIST DOCUMENTED IN MEDICAL RECORD: ICD-10-PCS | Mod: CPTII,S$GLB,, | Performed by: INTERNAL MEDICINE

## 2023-09-18 PROCEDURE — 1160F RVW MEDS BY RX/DR IN RCRD: CPT | Mod: CPTII,S$GLB,, | Performed by: INTERNAL MEDICINE

## 2023-09-18 PROCEDURE — 3066F NEPHROPATHY DOC TX: CPT | Mod: CPTII,S$GLB,, | Performed by: INTERNAL MEDICINE

## 2023-09-18 PROCEDURE — 4010F ACE/ARB THERAPY RXD/TAKEN: CPT | Mod: CPTII,S$GLB,, | Performed by: INTERNAL MEDICINE

## 2023-09-18 PROCEDURE — 4010F PR ACE/ARB THEARPY RXD/TAKEN: ICD-10-PCS | Mod: CPTII,S$GLB,, | Performed by: INTERNAL MEDICINE

## 2023-09-18 PROCEDURE — 99214 PR OFFICE/OUTPT VISIT, EST, LEVL IV, 30-39 MIN: ICD-10-PCS | Mod: S$GLB,,, | Performed by: INTERNAL MEDICINE

## 2023-09-18 PROCEDURE — 99214 OFFICE O/P EST MOD 30 MIN: CPT | Mod: S$GLB,,, | Performed by: INTERNAL MEDICINE

## 2023-09-18 PROCEDURE — 3044F PR MOST RECENT HEMOGLOBIN A1C LEVEL <7.0%: ICD-10-PCS | Mod: CPTII,S$GLB,, | Performed by: INTERNAL MEDICINE

## 2023-09-18 PROCEDURE — 3008F BODY MASS INDEX DOCD: CPT | Mod: CPTII,S$GLB,, | Performed by: INTERNAL MEDICINE

## 2023-09-18 PROCEDURE — 3079F DIAST BP 80-89 MM HG: CPT | Mod: CPTII,S$GLB,, | Performed by: INTERNAL MEDICINE

## 2023-09-18 PROCEDURE — 99999 PR PBB SHADOW E&M-EST. PATIENT-LVL V: CPT | Mod: PBBFAC,,, | Performed by: INTERNAL MEDICINE

## 2023-09-18 PROCEDURE — 3044F HG A1C LEVEL LT 7.0%: CPT | Mod: CPTII,S$GLB,, | Performed by: INTERNAL MEDICINE

## 2023-09-18 PROCEDURE — 99999 PR PBB SHADOW E&M-EST. PATIENT-LVL V: ICD-10-PCS | Mod: PBBFAC,,, | Performed by: INTERNAL MEDICINE

## 2023-09-18 PROCEDURE — 3075F SYST BP GE 130 - 139MM HG: CPT | Mod: CPTII,S$GLB,, | Performed by: INTERNAL MEDICINE

## 2023-09-18 PROCEDURE — 3061F PR NEG MICROALBUMINURIA RESULT DOCUMENTED/REVIEW: ICD-10-PCS | Mod: CPTII,S$GLB,, | Performed by: INTERNAL MEDICINE

## 2023-09-18 PROCEDURE — 3075F PR MOST RECENT SYSTOLIC BLOOD PRESS GE 130-139MM HG: ICD-10-PCS | Mod: CPTII,S$GLB,, | Performed by: INTERNAL MEDICINE

## 2023-09-18 PROCEDURE — 3061F NEG MICROALBUMINURIA REV: CPT | Mod: CPTII,S$GLB,, | Performed by: INTERNAL MEDICINE

## 2023-09-18 PROCEDURE — 3079F PR MOST RECENT DIASTOLIC BLOOD PRESSURE 80-89 MM HG: ICD-10-PCS | Mod: CPTII,S$GLB,, | Performed by: INTERNAL MEDICINE

## 2023-09-18 PROCEDURE — 1159F MED LIST DOCD IN RCRD: CPT | Mod: CPTII,S$GLB,, | Performed by: INTERNAL MEDICINE

## 2023-09-18 RX ORDER — PROMETHAZINE HYDROCHLORIDE AND DEXTROMETHORPHAN HYDROBROMIDE 6.25; 15 MG/5ML; MG/5ML
5 SYRUP ORAL EVERY 8 HOURS PRN
Qty: 240 ML | Refills: 0 | Status: SHIPPED | OUTPATIENT
Start: 2023-09-18 | End: 2023-10-04

## 2023-09-18 RX ORDER — METHYLPREDNISOLONE 4 MG/1
TABLET ORAL
Qty: 21 EACH | Refills: 0 | Status: SHIPPED | OUTPATIENT
Start: 2023-09-18 | End: 2023-10-09

## 2023-09-18 RX ORDER — AMOXICILLIN AND CLAVULANATE POTASSIUM 875; 125 MG/1; MG/1
1 TABLET, FILM COATED ORAL EVERY 12 HOURS
Qty: 14 TABLET | Refills: 0 | Status: SHIPPED | OUTPATIENT
Start: 2023-09-18 | End: 2023-12-27

## 2023-09-18 NOTE — PROGRESS NOTES
Subjective:       Patient ID: David Nicole Jr. is a 63 y.o. male.    Chief Complaint: Cough and Generalized Body Aches      HPI  David Nicole Jr. is a 63 y.o. year old male with migraine, depression, glaucoma suspect, HLD, HTN, ED, BPH, DOUGLAS, T2DM (HgbA1C 5.3), GERD, HILARIA, obesity established patient of Dr. Parker presents for urgent care visit for persistent cough and fatigue since URI at end of August. Swabbed self in august at home, was negative for covid-19.     No fever, chills, (+) lightheadedness, no dizziness.   Frontal sinus congestion, persistent ear pressure.   (+) rhinorrhea, light yellow green sputum, thick.   No abdominal pain.   (+) nausea, no vomiting.   No blood.   Scratchy throat, no pain, no voice change. No difficulty swallowing food or water.     No worse reflux than usual.     Has taken mucinex, equate sinus and cold OTC.   Taking cetirizine daily (equate brand).  Flonase once a day, in morning.     Review of Systems   Constitutional:  Positive for fatigue. Negative for activity change, appetite change, chills, fever and unexpected weight change.   HENT:  Positive for congestion. Negative for rhinorrhea and sore throat.    Eyes:  Negative for visual disturbance.   Respiratory:  Positive for cough. Negative for shortness of breath and wheezing.    Cardiovascular:  Negative for chest pain.   Gastrointestinal:  Negative for abdominal pain, diarrhea, nausea and vomiting.   Genitourinary:  Negative for difficulty urinating and dysuria.   Musculoskeletal:  Negative for arthralgias, back pain and myalgias.   Skin:  Negative for color change and rash.   Neurological:  Negative for dizziness, weakness and headaches.         Past Medical History:   Diagnosis Date    Anxiety     Arthritis     Baker's cyst     Diabetes mellitus, type 2     H/O peptic ulcer 02/27/2018    H/O type 2 diabetes mellitus 02/27/2018    Hyperlipidemia     Hypertension     Insomnia     Intractable migraine with aura with  status migrainosus 08/17/2016    GONB (Greater Occipital Nerve Block):2016    Joint pain     Knee pain     HILARIA (obstructive sleep apnea)     Prediabetes         Prior to Admission medications    Medication Sig Start Date End Date Taking? Authorizing Provider   ascorbic acid, vitamin C, (VITAMIN C) 100 MG tablet Take 100 mg by mouth once daily.   Yes Provider, Historical   atorvastatin (LIPITOR) 80 MG tablet Take 1 tablet (80 mg total) by mouth every evening. 7/6/23  Yes Estelle Parker MD   buPROPion (WELLBUTRIN XL) 300 MG 24 hr tablet Take 1 tablet (300 mg total) by mouth once daily. 8/7/23 8/6/24 Yes Estelle Parker MD   CYANOCOBALAMIN, VITAMIN B-12, (VITAMIN B-12 ORAL) Take by mouth.   Yes Provider, Historical   FLUoxetine 40 MG capsule Take 2 capsules (80 mg total) by mouth every morning. 7/6/23  Yes Estelle Parker MD   fluticasone propionate (FLONASE) 50 mcg/actuation nasal spray 2 sprays (100 mcg total) by Each Nostril route once daily. 4/23/21  Yes Sejal Hughes MD   losartan (COZAAR) 100 MG tablet Take 1 tablet (100 mg total) by mouth once daily. 7/6/23 7/5/24 Yes Estelle Parker MD   omeprazole (PRILOSEC) 20 MG capsule TAKE 1 CAPSULE BY MOUTH  EVERY MORNING TO CONTROL  GERD 7/6/23  Yes Estelle Parker MD   ondansetron (ZOFRAN-ODT) 8 MG TbDL Take 1 tablet (8 mg total) by mouth every 24 hours as needed (nausea). 5/18/22  Yes Luis Flynn MD   semaglutide (OZEMPIC) 1 mg/dose (4 mg/3 mL) Inject 1 mg into the skin every 7 days. 7/6/23  Yes Estelle Parker MD   verapamiL (VERELAN) 240 MG C24P Take 1 capsule (240 mg total) by mouth once daily. 7/6/23  Yes Estelle Parker MD   ferrous sulfate 325 (65 FE) MG EC tablet Take 325 mg by mouth once daily.    Provider, Historical   multivitamin (THERAGRAN) per tablet Take 1 tablet by mouth once daily.    Provider, Historical   vitamin D (VITAMIN D3) 1000 units Tab Take 1,000 Units by mouth once daily.    Provider, Historical        Past medical  "history, surgical history, and family medical history reviewed and updated as appropriate.    Medications and allergies reviewed.     Objective:          Vitals:    09/18/23 0913 09/18/23 0947   BP: 130/80    BP Location: Right arm    Patient Position: Sitting    Pulse: (!) 117 105   SpO2: 98%    Weight: 92.5 kg (203 lb 14.8 oz)    Height: 5' 7" (1.702 m)      Body mass index is 31.94 kg/m².  Physical Exam  Constitutional:       Appearance: Normal appearance.   HENT:      Left Ear: There is impacted cerumen.      Mouth/Throat:      Pharynx: No oropharyngeal exudate.      Comments: L posterior oropharynx with erythema   Eyes:      Extraocular Movements: Extraocular movements intact.   Cardiovascular:      Rate and Rhythm: Tachycardia present.   Pulmonary:      Effort: No respiratory distress.      Breath sounds: Normal breath sounds.   Musculoskeletal:         General: Normal range of motion.   Neurological:      General: No focal deficit present.      Mental Status: He is alert and oriented to person, place, and time.         Lab Results   Component Value Date    WBC 9.47 07/07/2023    HGB 15.0 07/07/2023    HCT 46.6 07/07/2023     07/07/2023    CHOL 106 (L) 07/07/2023    TRIG 53 07/07/2023    HDL 36 (L) 07/07/2023    ALT 18 07/07/2023    AST 21 07/07/2023     07/07/2023    K 4.3 07/07/2023     07/07/2023    CREATININE 0.8 07/07/2023    BUN 14 07/07/2023    CO2 26 07/07/2023    TSH 1.370 07/07/2023    PSA 0.85 07/07/2023    HGBA1C 5.3 07/07/2023       Assessment:       1. Chronic frontal sinusitis    2. Ear ache    3. Productive cough          Plan:     David was seen today for cough and generalized body aches.    Diagnoses and all orders for this visit:    Chronic frontal sinusitis  -     methylPREDNISolone (MEDROL DOSEPACK) 4 mg tablet; use as directed  -     promethazine-dextromethorphan (PROMETHAZINE-DM) 6.25-15 mg/5 mL Syrp; Take 5 mLs by mouth every 8 (eight) hours as needed.  -     " amoxicillin-clavulanate 875-125mg (AUGMENTIN) 875-125 mg per tablet; Take 1 tablet by mouth every 12 (twelve) hours.    Ear ache  -     methylPREDNISolone (MEDROL DOSEPACK) 4 mg tablet; use as directed  -     promethazine-dextromethorphan (PROMETHAZINE-DM) 6.25-15 mg/5 mL Syrp; Take 5 mLs by mouth every 8 (eight) hours as needed.  -     amoxicillin-clavulanate 875-125mg (AUGMENTIN) 875-125 mg per tablet; Take 1 tablet by mouth every 12 (twelve) hours.    Productive cough  -     methylPREDNISolone (MEDROL DOSEPACK) 4 mg tablet; use as directed  -     promethazine-dextromethorphan (PROMETHAZINE-DM) 6.25-15 mg/5 mL Syrp; Take 5 mLs by mouth every 8 (eight) hours as needed.  -     amoxicillin-clavulanate 875-125mg (AUGMENTIN) 875-125 mg per tablet; Take 1 tablet by mouth every 12 (twelve) hours.    Persistent sinus congestion / ear congestion > 1 month after URI with persistent fatigue, productive cough.    Health maintenance reviewed with patient.     Follow up if symptoms worsen or fail to improve.    Farhat Acosta MD  Internal Medicine / Primary Care  [facility]  9/18/2023

## 2023-09-18 NOTE — PATIENT INSTRUCTIONS
Start augmentin 1 tablet twice a day for 7 days  Start medrol dose pack, follow directions on packaging  Start promethazine-DM cough syrup up to three times a day as needed. Can make you drowsy.     Follow up as needed.

## 2023-09-19 ENCOUNTER — PATIENT OUTREACH (OUTPATIENT)
Dept: ADMINISTRATIVE | Facility: HOSPITAL | Age: 63
End: 2023-09-19
Payer: COMMERCIAL

## 2023-09-19 NOTE — PROGRESS NOTES
Health Maintenance Due   Topic Date Due    Shingles Vaccine (1 of 2) Never done    Pneumococcal Vaccines (Age 0-64) (2 - PCV) 09/01/2021    COVID-19 Vaccine (4 - Moderna series) 12/18/2021    Colorectal Cancer Screening  02/13/2022    Influenza Vaccine (1) 09/01/2023    Eye Exam  12/13/2023     Triggered LINKS and reconciled immunizations. Updated Care Everywhere. Imported outside diabetic eye exam results received from [facility] into [facility]- negative retinopathy. Chart review completed.

## 2023-09-26 ENCOUNTER — PATIENT MESSAGE (OUTPATIENT)
Dept: PRIMARY CARE CLINIC | Facility: CLINIC | Age: 63
End: 2023-09-26
Payer: COMMERCIAL

## 2023-09-27 RX ORDER — FLUTICASONE PROPIONATE 50 MCG
2 SPRAY, SUSPENSION (ML) NASAL DAILY
Qty: 48 G | Refills: 3 | Status: SHIPPED | OUTPATIENT
Start: 2023-09-27

## 2023-10-10 ENCOUNTER — PATIENT OUTREACH (OUTPATIENT)
Dept: ADMINISTRATIVE | Facility: HOSPITAL | Age: 63
End: 2023-10-10
Payer: COMMERCIAL

## 2023-10-10 NOTE — PROGRESS NOTES
Health Maintenance Due   Topic Date Due    Shingles Vaccine (1 of 2) Never done    Pneumococcal Vaccines (Age 0-64) (2 - PCV) 09/01/2021    Colorectal Cancer Screening  02/13/2022    Influenza Vaccine (1) 09/01/2023    COVID-19 Vaccine (4 - 2023-24 season) 09/01/2023    Eye Exam  12/13/2023       [facility] updated. Triggered LINKS and Care Everywhere. Chart reviewed for eye exam.    Dulce Raza LPN   Clinical Care Coordinator  Primary Care and Wellness

## 2023-10-13 ENCOUNTER — CLINICAL SUPPORT (OUTPATIENT)
Dept: ENDOSCOPY | Facility: HOSPITAL | Age: 63
End: 2023-10-13
Attending: INTERNAL MEDICINE
Payer: COMMERCIAL

## 2023-10-13 ENCOUNTER — TELEPHONE (OUTPATIENT)
Dept: ENDOSCOPY | Facility: HOSPITAL | Age: 63
End: 2023-10-13

## 2023-10-13 DIAGNOSIS — Z12.11 ENCOUNTER FOR COLORECTAL CANCER SCREENING: ICD-10-CM

## 2023-10-13 DIAGNOSIS — Z12.12 ENCOUNTER FOR COLORECTAL CANCER SCREENING: ICD-10-CM

## 2023-10-13 RX ORDER — SODIUM, POTASSIUM,MAG SULFATES 17.5-3.13G
1 SOLUTION, RECONSTITUTED, ORAL ORAL DAILY
Qty: 1 KIT | Refills: 0 | Status: SHIPPED | OUTPATIENT
Start: 2023-10-13 | End: 2023-10-13

## 2023-10-13 RX ORDER — SODIUM, POTASSIUM,MAG SULFATES 17.5-3.13G
1 SOLUTION, RECONSTITUTED, ORAL ORAL DAILY
Qty: 1 KIT | Refills: 0 | Status: SHIPPED | OUTPATIENT
Start: 2023-10-13 | End: 2023-10-15

## 2023-10-13 NOTE — TELEPHONE ENCOUNTER
Spoke to pt to schedule procedure(s) Colonoscopy       Physician to perform procedure(s) Dr. BANDAR Contreras  Date of Procedure (s) 1/26/24  Arrival Time 8:00 AM  Time of Procedure(s) 9:00 AM   Location of Procedure(s) [place] [address] Floor  Type of Rx Prep sent to patient: Suprep  Instructions provided to patient via [facility]    Patient was informed on the following information and verbalized understanding. Screening questionnaire reviewed with patient and complete. If procedure requires anesthesia, a responsible adult needs to be present to accompany the patient home, patient cannot drive after receiving anesthesia. Appointment details are tentative, especially check-in time. Patient will receive a prep-op call 4 days prior to confirm check-in time for procedure. If applicable the patient should contact their pharmacy to verify Rx for procedure prep is ready for pick-up. Patient was advised to call the scheduling department at [phone] if pharmacy states no Rx is available. Patient was advised to call the endoscopy scheduling department if any questions or concerns arise.      [facility] Scheduling Department

## 2023-12-18 ENCOUNTER — OFFICE VISIT (OUTPATIENT)
Dept: URGENT CARE | Facility: CLINIC | Age: 63
End: 2023-12-18
Payer: COMMERCIAL

## 2023-12-18 VITALS
RESPIRATION RATE: 17 BRPM | WEIGHT: 196.88 LBS | OXYGEN SATURATION: 99 % | SYSTOLIC BLOOD PRESSURE: 133 MMHG | BODY MASS INDEX: 30.83 KG/M2 | HEART RATE: 102 BPM | TEMPERATURE: 98 F | DIASTOLIC BLOOD PRESSURE: 77 MMHG

## 2023-12-18 DIAGNOSIS — U07.1 COVID-19 VIRUS DETECTED: ICD-10-CM

## 2023-12-18 DIAGNOSIS — U07.1 COVID-19 VIRUS INFECTION: Primary | ICD-10-CM

## 2023-12-18 LAB
CTP QC/QA: YES
CTP QC/QA: YES
POC MOLECULAR INFLUENZA A AGN: NEGATIVE
POC MOLECULAR INFLUENZA B AGN: NEGATIVE
SARS-COV-2 AG RESP QL IA.RAPID: POSITIVE

## 2023-12-18 PROCEDURE — 99214 PR OFFICE/OUTPT VISIT, EST, LEVL IV, 30-39 MIN: ICD-10-PCS | Mod: S$GLB,,, | Performed by: PHYSICIAN ASSISTANT

## 2023-12-18 PROCEDURE — 99214 OFFICE O/P EST MOD 30 MIN: CPT | Mod: S$GLB,,, | Performed by: PHYSICIAN ASSISTANT

## 2023-12-18 PROCEDURE — 87502 INFLUENZA DNA AMP PROBE: CPT | Mod: QW,S$GLB,, | Performed by: PHYSICIAN ASSISTANT

## 2023-12-18 PROCEDURE — 87502 POCT INFLUENZA A/B MOLECULAR: ICD-10-PCS | Mod: QW,S$GLB,, | Performed by: PHYSICIAN ASSISTANT

## 2023-12-18 PROCEDURE — 87811 SARS-COV-2 COVID19 W/OPTIC: CPT | Mod: QW,S$GLB,, | Performed by: PHYSICIAN ASSISTANT

## 2023-12-18 PROCEDURE — 87811 SARS CORONAVIRUS 2 ANTIGEN POCT, MANUAL READ: ICD-10-PCS | Mod: QW,S$GLB,, | Performed by: PHYSICIAN ASSISTANT

## 2023-12-18 RX ORDER — BENZONATATE 200 MG/1
200 CAPSULE ORAL 3 TIMES DAILY PRN
Qty: 30 CAPSULE | Refills: 0 | Status: SHIPPED | OUTPATIENT
Start: 2023-12-18 | End: 2023-12-28

## 2023-12-18 RX ORDER — PROMETHAZINE HYDROCHLORIDE AND DEXTROMETHORPHAN HYDROBROMIDE 6.25; 15 MG/5ML; MG/5ML
5 SYRUP ORAL NIGHTLY PRN
Qty: 118 ML | Refills: 0 | Status: SHIPPED | OUTPATIENT
Start: 2023-12-18 | End: 2023-12-27 | Stop reason: SDUPTHER

## 2023-12-18 NOTE — PROGRESS NOTES
Subjective:      Patient ID: David Nicole Jr. is a 63 y.o. male.    Vitals:  weight is 89.3 kg (196 lb 13.9 oz). His oral temperature is 98.3 °F (36.8 °C). His blood pressure is 133/77 and his pulse is 102. His respiration is 17 and oxygen saturation is 99%.     Chief Complaint: Headache    Pt. States symptoms of headache, bodyaches, congestion, cough, postnasal drip, fatigue that began 2 days ago.    Headache   The current episode started in the past 7 days. The problem occurs constantly. The problem has been unchanged. The quality of the pain is described as aching. The pain is at a severity of 2/10. The pain is mild. Associated symptoms include coughing, nausea and vomiting. Pertinent negatives include no abdominal pain, dizziness, ear pain, eye pain, eye redness, fever, loss of balance, neck pain or numbness. Nothing aggravates the symptoms. He has tried acetaminophen for the symptoms.       Constitution: Positive for fatigue. Negative for chills, sweating and fever.   HENT:  Positive for congestion and postnasal drip. Negative for ear pain and foreign body in ear.    Neck: Negative for neck pain and neck stiffness.   Cardiovascular:  Negative for chest pain, leg swelling, palpitations and sob on exertion.   Eyes:  Negative for eye itching, eye pain and eye redness.   Respiratory:  Positive for cough and sputum production. Negative for chest tightness, bloody sputum and shortness of breath.    Gastrointestinal:  Positive for nausea and vomiting. Negative for abdominal pain and diarrhea.   Genitourinary:  Negative for dysuria, frequency, urgency, flank pain and hematuria.   Musculoskeletal:  Positive for muscle ache. Negative for pain and joint pain.   Skin:  Negative for color change and rash.   Neurological:  Positive for headaches. Negative for dizziness, passing out, coordination disturbances, loss of balance, disorientation and numbness.   Psychiatric/Behavioral:  Negative for disorientation.        Objective:     Physical Exam   Constitutional: He is oriented to person, place, and time. He appears well-developed. He is cooperative.  Non-toxic appearance. He does not appear ill. No distress.   HENT:   Head: Normocephalic and atraumatic.   Ears:   Right Ear: Hearing, tympanic membrane, external ear and ear canal normal.   Left Ear: Hearing, tympanic membrane, external ear and ear canal normal.   Nose: Mucosal edema present. No rhinorrhea or nasal deformity. No epistaxis. Right sinus exhibits maxillary sinus tenderness. Right sinus exhibits no frontal sinus tenderness. Left sinus exhibits maxillary sinus tenderness. Left sinus exhibits no frontal sinus tenderness.   Mouth/Throat: Uvula is midline, oropharynx is clear and moist and mucous membranes are normal. He does not have dentures. No trismus in the jaw. Normal dentition. No uvula swelling or dental caries. No oropharyngeal exudate, posterior oropharyngeal edema, posterior oropharyngeal erythema, tonsillar abscesses or cobblestoning. No tonsillar exudate.   Eyes: Conjunctivae and lids are normal. No scleral icterus.   Neck: Trachea normal and phonation normal. Neck supple. No edema present. No erythema present. No neck rigidity present.   Cardiovascular: Normal rate, regular rhythm, normal heart sounds and normal pulses.   Pulmonary/Chest: Effort normal and breath sounds normal. No accessory muscle usage or stridor. No tachypnea and no bradypnea. No respiratory distress. He has no decreased breath sounds. He has no wheezes. He has no rhonchi. He has no rales.   Abdominal: Normal appearance.   Musculoskeletal: Normal range of motion.         General: No deformity. Normal range of motion.   Lymphadenopathy:     He has no cervical adenopathy.   Neurological: He is alert and oriented to person, place, and time. He exhibits normal muscle tone. Coordination normal.   Skin: Skin is warm, dry, intact, not diaphoretic and not pale.   Psychiatric: His speech is  normal and behavior is normal. Judgment and thought content normal.   Nursing note and vitals reviewed.    Results for orders placed or performed in visit on 12/18/23   SARS Coronavirus 2 Antigen, POCT Manual Read   Result Value Ref Range    SARS Coronavirus 2 Antigen Positive (A) Negative     Acceptable Yes    POCT Influenza A/B MOLECULAR   Result Value Ref Range    POC Molecular Influenza A Ag Negative Negative, Not Reported    POC Molecular Influenza B Ag Negative Negative, Not Reported     Acceptable Yes          Assessment:     1. COVID-19 virus infection        Plan:     - counseled patient and answered questions in regards to COVID-19 testing and diagnosis and quarantine. Discussed home care and follow up precautions.     - I have reviewed prior labs including A1c and CMP. Kidney function reviewed prior to paxlovid rx    - discussed paxlovid and patient does meet criteria. Informed about paxlovid,  and patient wants to take this medication for treatment of covid 19.       COVID-19 virus infection  -     SARS Coronavirus 2 Antigen, POCT Manual Read  -     nirmatrelvir-ritonavir 300 mg (150 mg x 2)-100 mg copackaged tablets (EUA); Take 3 tablets by mouth 2 (two) times daily for 5 days. Each dose contains 2 nirmatrelvir (pink tablets) and 1 ritonavir (white tablet). Take all 3 tablets together  Dispense: 30 tablet; Refill: 0  -     benzonatate (TESSALON) 200 MG capsule; Take 1 capsule (200 mg total) by mouth 3 (three) times daily as needed for Cough.  Dispense: 30 capsule; Refill: 0  -     promethazine-dextromethorphan (PROMETHAZINE-DM) 6.25-15 mg/5 mL Syrp; Take 5 mLs by mouth nightly as needed (cough).  Dispense: 118 mL; Refill: 0  -     POCT Influenza A/B MOLECULAR      Patient Instructions   DISCONTINUE ATORVASTATIN FOR 10 DAYS DUE TO INTERACTION WITH PAXLOVID.  PAXLOVID MAY POTENTIALLY INTERACT WITH VERAPAMIL INCREASING THE CONCENTRATION.  MONITOR FOR LOW BLOOD PRESSURE OR LOW  PULSE. IF YOU NEED TO DISCONTINUE TEMPORARILY OR CUT DOSE IN HALF, YOU CAN.       You have tested positive for COVID-19 today.      ISOLATION    If you tested positive and do not have symptoms, you must isolate for 5 days starting on the day of the positive test.     If you tested positive and have symptoms, you must isolate for 5 days starting on the day of the first symptoms,  not the day of the positive test.    This is the most important part: both the CDC and the [facility] emphasize that you do not test out of isolation.    After 5 days, if your symptoms have improved and you have not had fever on day 5, you can return to the community on day 6- NO TESTING REQUIRED! You must continue to wear mask on days 6-10.    In fact, we do not retest if you were positive in the last 90 days.    After your 5 days of isolation are completed, the CDC recommends strict mask use for the first 5 days that you come out of isolation.       - Rest.    - Drink plenty of fluids.  - Viral upper respiratory infections typically run their course in 10-14 days.     - Tylenol (acetaminophen) or Ibuprofen as directed as needed for fever/pain. Avoid tylenol if you have a history of liver disease. Do not take ibuprofen if you have a history of GI bleeding, kidney disease, gastric surgery, or if you take blood thinners.     - You can take over-the-counter claritin, zyrtec, allegra, or xyzal as directed. These are antihistamines that can help with runny nose, nasal congestion, sneezing, and helps to dry up post-nasal drip, which usually causes sore throat and cough.   - If you do NOT have high blood pressure, you may use a decongestant form (D)  of this medication (ie. Claritin- D, zyrtec-D, allegra-D) or if you do not take the D form, you can take sudafed (pseudoephedrine) over the counter, which is a decongestant. Do NOT take two decongestant (D) medications at the same time (such as mucinex-D and claritin-D or plain sudafed and claritin D)    -  You can use Flonase (fluticasone) nasal spray as directed for sinus congestion and postnasal drip. This is a steroid nasal spray that works locally over time to decrease the inflammation in your nose/sinuses and help with allergic symptoms. This is not an quick- relief spray like afrin, but it works well if used daily.  Discontinue if you develop nose bleed  - use OTC nasal saline prior to Flonase.  - you can use OTC nasal saline such as Ocean Spray Nasal Saline 1-3 puffs each nostril every 2-3 hours then blow out onto tissue. This is to irrigate the nasal passage way to clear the sinus openings. Use until sinus problem resolved.    - you can take plain OTC Mucinex (guaifenesin) 1200 mg twice a day to help loosen mucous.     -warm salt water gargles can help with sore throat    - warm tea with honey can help with cough. Honey is a natural cough suppressant.    - Take the tessalon (benzonatate) as needed as prescribed for cough   - Only take the promethazine- DM as directed, as needed at night for cough. This medication may cause drowsiness.      - Follow up with your PCP or specialty clinic as directed in the next 1-2 weeks if not improved or as needed.  You can call [phone] to schedule an appointment with the appropriate provider.      - Go to the ER if you develop new or worsening symptoms.     - You must understand that you have received an Urgent Care treatment only and that you may be released before all of your medical problems are known or treated.   - You, the patient, will arrange for follow up care as instructed.   - If your condition worsens or fails to improve we recommend that you receive another evaluation at the ER immediately or contact your PCP to discuss your concerns or return here.   ----------------------------------    FACT SHEET FOR PATIENTS, PARENTS, AND CAREGIVERS EMERGENCY USE AUTHORIZATION (EUA) OF PAXLOVID FOR CORONAVIRUS DISEASE 2019 (COVID-19)     You are being given this Fact  Sheet because your healthcare provider believes it is necessary to provide you with PAXLOVID for the treatment of mild-to-moderate coronavirus disease (COVID-19) caused by the SARS-CoV-2 virus. This Fact Sheet contains information to help you understand the risks and benefits of taking the PAXLOVID you may receive. This Fact Sheet also contains information about how to take PAXLOVID and how to report side effects or problems with the appearance or packaging of PAXLOVID. The U.S. Food and Drug Administration (FDA) has issued an Emergency Use Authorization (EUA) to make PAXLOVID available for the treatment of mild-to-moderate COVID-19 in adults and children 12 years of age and older weighing at least 88 pounds (40 kg) who are at high risk for progression to severe COVID-19, including hospitalization or death (for more details about an EUA please see What is an Emergency Use Authorization? at the end of this document). Read this Fact Sheet for information about PAXLOVID. Talk to your healthcare provider about your options or if you have any questions. It is your choice to take PAXLOVID. What is COVID-19? COVID-19 is caused by a virus called a coronavirus. You can get COVID-19 through close contact with another person who has the virus. COVID-19 illnesses have ranged from very mild-to-severe, including illness resulting in death. While information so far suggests that most COVID-19 illness is mild, serious illness can happen and may cause some of your other medical conditions to become worse. Older people and people of all ages with severe, long lasting (chronic) medical conditions like heart disease, lung disease, and diabetes, for example seem to be at higher risk of being hospitalized for COVID-19. What is PAXLOVID? PAXLOVID is a medicine that is available under EUA for the treatment of mild-to-moderate COVID-19 in adults and children 12 years of age and older weighing at least 88 pounds (40 kg) who are at high  risk for progression to severe COVID-19, including hospitalization or death. PAXLOVID is FDA-approved for the treatment of COVID-19 in certain adults (see section What other treatment choices are there?). PAXLOVID is not FDA-approved or available under EUA for use in children younger than 12 years of age or weighing less than 88 pounds (40 kg). There is limited information about the safety and effectiveness of using PAXLOVID to treat children younger than 12 years of age or weighing less than 88 pounds (40 kg) with mild-to-moderate COVID-19. 2 Revised: 11/2023 What is the most important information I should know about PAXLOVID? PAXLOVID can interact with other medicines causing severe or life-threatening side effects or death. It is important to know the medicines that should not be taken with PAXLOVID. Do not take PAXLOVID if:  you are taking any of the following medicines: o alfuzosin o amiodarone o apalutamide o carbamazepine o colchicine o dihydroergotamine o dronedarone o eletriptan o eplerenone o ergotamine o finerenone o flecainide o flibanserin o ivabradine o lomitapide o lovastatin o lumacaftor/ivacaftor o lurasidone o methylergonovine o midazolam (oral) o naloxegol o phenobarbital o phenytoin o pimozide o primidone o propafenone o quinidine o ranolazine o rifampin o rifapentine o Nilams Wort (hypericum perforatum) o sildenafil (Revatio®) for pulmonary arterial hypertension o silodosin o simvastatin o tolvaptan o triazolam o ubrogepant o voclosporin These are not the only medicines that may cause serious or life-threatening side effects if taken with PAXLOVID. PAXLOVID may increase or decrease the levels of multiple other medicines. It is very important to tell your healthcare provider about all of the medicines you are taking because additional laboratory tests or changes in the dose of your other medicines may be necessary during treatment with PAXLOVID. Your healthcare provider may also tell you  about specific symptoms to watch out for that may indicate that you need to stop or decrease the dose of some of your other medicines.  you are allergic to nirmatrelvir, ritonavir, or any of the ingredients in PAXLOVID. See the end of this leaflet for a complete list of ingredients in PAXLOVID. See What are the important possible side effects of PAXLOVID? for signs and symptoms of allergic reactions. What should I tell my healthcare provider before I take PAXLOVID? Tell your healthcare provider if you:  have kidney problems. You may need a different dose of PAXLOVID.  have liver problems, including hepatitis.  have Human Immunodeficiency Virus 1 (HIV-1) infection. PAXLOVID may lead to some HIV-1 medicines not working as well in the future. 3 Revised: 11/2023  are pregnant or plan to become pregnant. It is not known if PAXLOVID can harm your unborn baby. Tell your healthcare provider right away if you are or if you become pregnant.  are breastfeeding or plan to breastfeed. It is not known if PAXLOVID can pass into your breast milk. Talk to your healthcare provider about the best way to feed your baby during treatment with PAXLOVID. Some medicines may interact with PAXLOVID and may cause serious side effects.  Tell your healthcare provider about all the medicines you take, including prescription and over-the-counter medicines, vitamins, and herbal supplements.  Your healthcare provider can tell you if it is safe to take PAXLOVID with other medicines.  You can ask your healthcare provider or pharmacist for a list of medicines that interact with PAXLOVID.  Do not start taking a new medicine without telling your healthcare provider. Tell your healthcare provider if you are taking combined birth control (hormonal contraceptive). PAXLOVID may affect how your hormonal contraceptives work. Females who are able to become pregnant should use another effective alternative form of contraception or an additional barrier method  of contraception during treatment with PAXLOVID. Talk to your healthcare provider if you have any questions about contraceptive methods that might be right for you. How do I take PAXLOVID?  Take PAXLOVID exactly as your healthcare provider tells you to take it.  PAXLOVID consists of 2 medicines: nirmatrelvir tablets and ritonavir tablets. The 2 medicines are taken together 2 times each day for 5 days. o Nirmatrelvir is an oval, pink tablet. o Ritonavir is a white or off-white tablet.  You will receive a Dose Pack containing either: o Two-dose blister cards (containing 5 blister cards) or o Single-dose blister cards (containing 10 blister cards).  See Figure A or Figure B below on how to take the PAXLOVID Dose Pack you receive and follow the instructions on how to take it correctly.  If you have kidney disease, your healthcare provider may prescribe a lower dose (see Figure A or Figure B). Talk to your healthcare provider to make sure you receive the correct Dose Pack. 4 Revised: 11/2023 Figure A (Two-dose Blister Card) If you are prescribed PAXLOVID 300 mg; 100 mg Dose Pack: each dose contains 3 tablets. How to take PAXLOVID 300 mg; 100 mg Dose Pack Morning Dose: Take the 2 pink nirmatrelvir tablets and 1 white to off-white ritonavir tablet together at the same time. Evening Dose: Take the 2 pink nirmatrelvir tablets and 1 white to off-white ritonavir tablet together at the same time. 5 Revised: 11/2023 Figure A (Two-dose Blister Card) If you are prescribed PAXLOVID 150 mg; 100 mg Dose Pack: each dose contains 2 tablets. How to take PAXLOVID 150 mg; 100 mg Dose Pack Morning Dose: Take the 1 pink nirmatrelvir tablet and 1 white to off-white ritonavir tablet together at the same time. Evening Dose: Take the 1 pink nirmatrelvir tablet and 1 white to off-white ritonavir tablet together at the same time. 6 Revised: 11/2023 Figure B (Single-dose Blister Card) If you are prescribed PAXLOVID 300 mg; 100 mg Dose Pack: each  dose contains 3 tablets. How to take PAXLOVID 300 mg; 100 mg Dose Pack Morning Dose: Take the 2 pink nirmatrelvir tablets and 1 white to off-white ritonavir tablet together. Bedtime Dose: Take the 2 pink nirmatrelvir tablets and 1 white to off-white ritonavir tablet together. 7 Revised: 11/2023 Figure B (Single-dose Blister Card) If you are prescribed PAXLOVID 150 mg; 100 mg Dose Pack: each dose contains 2 tablets. How to take PAXLOVID 150 mg; 100 mg Dose Pack Morning Dose: Take the 1 pink nirmatrelvir tablet and 1 white to off-white ritonavir tablet together. Bedtime Dose: Take the 1 pink nirmatrelvir tablet and 1 white to off-white ritonavir tablet together. 8 Revised: 11/2023  Do not remove your PAXLOVID tablets from the blister card before you are ready to take your dose. o Take your first dose of PAXLOVID in the morning or evening, depending on when you  your prescription, or as your healthcare provider tells you to.  Swallow the tablets whole. Do not chew, break, or crush the tablets.  Take PAXLOVID with or without food.  Do not stop taking PAXLOVID without talking to your healthcare provider, even if you feel better.  If you miss a dose of PAXLOVID within 8 hours of the time it is usually taken, take it as soon as you remember. If you miss a dose by more than 8 hours, skip the missed dose and take the next dose at your regular time. Do not take 2 doses of PAXLOVID at the same time.  If you take too much PAXLOVID, call your healthcare provider or go to the nearest hospital emergency room right away.  If you are taking a ritonavir- or cobicistat-containing medicine to treat hepatitis C or HIV-1 infection, you should continue to take your medicine as prescribed by your healthcare provider. Talk to your healthcare provider if you do not feel better or if you feel worse after 5 days. What are the important possible side effects of PAXLOVID? PAXLOVID may cause serious side effects, including:  Allergic  reactions, including severe allergic reactions (anaphylaxis) have happened during treatment with PAXLOVID. Stop taking PAXLOVID and get medical help right away if you get any of the following symptoms of an allergic reaction: o skin rash, hives, blisters or peeling skin o painful sores or ulcers in the mouth, nose, throat or genital area o swelling of the mouth, lips, tongue or face o trouble swallowing or breathing o throat tightness o hoarseness  Liver Problems. Tell your healthcare provider right away if you get any of the following signs and symptoms of liver problems during treatment with PAXLOVID: o loss of appetite o yellowing of your skin and the white of eyes o dark-colored urine o pale colored stools o itchy skin o stomach-area (abdominal) pain 9 Revised: 11/2023 The most common side effects of PAXLOVID include: altered sense of taste and diarrhea. Other possible side effects include: o headache o vomiting o abdominal pain o nausea o high blood pressure o feeling generally unwell These are not all of the possible side effects of PAXLOVID. For more information, ask your healthcare provider or pharmacist. What other treatment choices are there? PAXLOVID is FDA-approved for the treatment of mild-to-moderate COVID-19 in certain adults; however, there are not sufficient quantities of the approved presentations (i.e., dose packs) of PAXLOVID at this time. This EUA continues to authorize the emergency use of PAXLOVID for the approved patient population to ensure continued access in order to meet the public health need. VEKLURY (remdesivir) is FDA-approved for the treatment of mild-to-moderate COVID-19 in certain adults and children. Talk with your healthcare provider to see if VEKLURY is appropriate for you. For information on the emergency use of other medicines that are authorized by FDA to treat people with COVID-19, please go to  https://www.fda.gov/emergencypreparedness-and-response/mcm-legal-regulatory-and-policy-framework/emergencyuse-authorization. Your healthcare provider may talk with you about clinical trials for which you may be eligible. It is your choice to be treated or not to be treated with PAXLOVID. Should you decide not to receive it or for your child not to receive it, it will not change your standard medical care. What if I am pregnant or breastfeeding? There is limited experience treating pregnant women or breastfeeding mothers with PAXLOVID. For a mother and unborn baby, the benefit of taking PAXLOVID may be greater than the risk from the treatment. If you are pregnant, discuss your options and specific situation with your healthcare provider. If you are breastfeeding, discuss your options and specific situation with your healthcare provider. 10 Revised: 11/2023 How do I report side effects or problems with the appearance or packaging of PAXLOVID? Contact your healthcare provider if you have any side effects that bother you or do not go away. Report side effects or problems with the appearance or packaging of PAXLOVID (see Figures A and B above for examples of PAXLOVID Dose Packs) to FDA MedWatch at www.fda.gov/medwatch or call 0-819-DSC-6513 or you can report side effects to [employer] at the contact information provided below. Website Fax number Telephone number [employer] [phone] How should I store PAXLOVID? Store PAXLOVID tablets at room temperature, between 68?F to 77?F (20?C to 25?C). Keep PAXLOVID and all medicines out of the reach of children. What if I have questions about the expiration date for my PAXLOVID? The FDA has extended the expiration date (shelf-life) for some lots of PAXLOVID. To find the extended expiration date, enter the lot number found on the side of carton or bottom of blister pack at this website: https://www.[employer]/ or talk with your  healthcare provider. Information on the authorized shelf-life extensions for PAXLOVID may also be found at https://www.fda.gov/emergency-preparedness-andresponse/mcm-legal-regulatory-and-policy-framework/expiration-dating-extension. How can I learn more about COVID-19?  Ask your healthcare provider.  Visit https://www.cdc.gov/COVID19.  Contact your local or state public health department. What is an Emergency Use Authorization (EUA)? The [place] FDA has made PAXLOVID available under an emergency access mechanism called an Emergency Use Authorization (EUA). The EUA is supported by a  of Health and Human Services ([facility]) declaration that circumstances exist to justify the emergency use of drugs and biological products during the COVID-19 pandemic. In issuing an EUA, the FDA has determined, among other things, that based on the total amount of scientific evidence available including data from adequate and well-controlled clinical trials, if available, it is reasonable to believe that the product may be effective for diagnosing, treating, or preventing COVID-19, or a serious or life-threatening disease or condition caused by COVID-19; that the known and potential benefits of the product, when used to diagnose, treat, or prevent such disease or 11 Revised: 11/2023 condition, outweigh the known and potential risks of such product; and that there are no adequate, approved, and available alternatives. All of these criteria must be met to allow for the product to be available under an EUA. The EUA for PAXLOVID is in effect for the duration of the COVID-19 declaration justifying emergency use of this product, unless the relevant EUA declaration is terminated or the EUA revoked (after which the products may no longer be used under the EUA). What are the ingredients in PAXLOVID? Active ingredient: nirmatrelvir and ritonavir Nirmatrelvir inactive ingredients: colloidal silicon dioxide, croscarmellose sodium, lactose  monohydrate, microcrystalline cellulose, and sodium stearyl fumarate. Film-coating contains: hydroxy propyl methylcellulose, iron oxide red, polyethylene glycol, and titanium dioxide. Ritonavir inactive ingredients: anhydrous dibasic calcium phosphate, colloidal silicon dioxide, copovidone, sodium stearyl fumarate, and sorbitan monolaurate. The film coating may contain: colloidal anhydrous silica, colloidal silicon dioxide, hydroxypropyl cellulose, hypromellose, polyethylene glycol, polysorbate 80, talc, and titanium dioxide. Additional Information For general questions, visit the website or call the telephone number provided below. Website Telephone number [email or web address] [phone] Revised: 11/2023

## 2023-12-18 NOTE — LETTER
[address] ? [place], [address] ? Phone [phone] ? Fax [phone]           Return to Work/School    Patient: David Nicole Jr.  YOB: 1960   Date: 12/18/2023      To Whom It May Concern:     David Nicole Jr. was in contact with/seen in my office on 12/18/2023     David Nicole Jr. has met the criteria for COVID-19 testing and has a POSITIVE result. He can return to work once they have improvement in symptoms, without fever for 24 hours without the use of fever reducing medications AND at least 5 days from the start of symptoms (or from the first positive result if they have no symptoms). They should also wear mask for an additional 5 days after the 5 day quarantine. Retesting is not recommended.        If you have any questions or concerns, or if I can be of further assistance, please do not hesitate to contact me.     Sincerely,    iVcente Llanes PA-C

## 2023-12-18 NOTE — PATIENT INSTRUCTIONS
DISCONTINUE ATORVASTATIN FOR 10 DAYS DUE TO INTERACTION WITH PAXLOVID.  PAXLOVID MAY POTENTIALLY INTERACT WITH VERAPAMIL INCREASING THE CONCENTRATION.  MONITOR FOR LOW BLOOD PRESSURE OR LOW PULSE. IF YOU NEED TO DISCONTINUE TEMPORARILY OR CUT DOSE IN HALF, YOU CAN.       You have tested positive for COVID-19 today.      ISOLATION    If you tested positive and do not have symptoms, you must isolate for 5 days starting on the day of the positive test.     If you tested positive and have symptoms, you must isolate for 5 days starting on the day of the first symptoms,  not the day of the positive test.    This is the most important part: both the CDC and the [facility] emphasize that you do not test out of isolation.    After 5 days, if your symptoms have improved and you have not had fever on day 5, you can return to the community on day 6- NO TESTING REQUIRED! You must continue to wear mask on days 6-10.    In fact, we do not retest if you were positive in the last 90 days.    After your 5 days of isolation are completed, the CDC recommends strict mask use for the first 5 days that you come out of isolation.       - Rest.    - Drink plenty of fluids.  - Viral upper respiratory infections typically run their course in 10-14 days.     - Tylenol (acetaminophen) or Ibuprofen as directed as needed for fever/pain. Avoid tylenol if you have a history of liver disease. Do not take ibuprofen if you have a history of GI bleeding, kidney disease, gastric surgery, or if you take blood thinners.     - You can take over-the-counter claritin, zyrtec, allegra, or xyzal as directed. These are antihistamines that can help with runny nose, nasal congestion, sneezing, and helps to dry up post-nasal drip, which usually causes sore throat and cough.   - If you do NOT have high blood pressure, you may use a decongestant form (D)  of this medication (ie. Claritin- D, zyrtec-D, allegra-D) or if you do not take the D form, you can take sudafed  (pseudoephedrine) over the counter, which is a decongestant. Do NOT take two decongestant (D) medications at the same time (such as mucinex-D and claritin-D or plain sudafed and claritin D)    - You can use Flonase (fluticasone) nasal spray as directed for sinus congestion and postnasal drip. This is a steroid nasal spray that works locally over time to decrease the inflammation in your nose/sinuses and help with allergic symptoms. This is not an quick- relief spray like afrin, but it works well if used daily.  Discontinue if you develop nose bleed  - use OTC nasal saline prior to Flonase.  - you can use OTC nasal saline such as Ocean Spray Nasal Saline 1-3 puffs each nostril every 2-3 hours then blow out onto tissue. This is to irrigate the nasal passage way to clear the sinus openings. Use until sinus problem resolved.    - you can take plain OTC Mucinex (guaifenesin) 1200 mg twice a day to help loosen mucous.     -warm salt water gargles can help with sore throat    - warm tea with honey can help with cough. Honey is a natural cough suppressant.    - Take the tessalon (benzonatate) as needed as prescribed for cough   - Only take the promethazine- DM as directed, as needed at night for cough. This medication may cause drowsiness.      - Follow up with your PCP or specialty clinic as directed in the next 1-2 weeks if not improved or as needed.  You can call [phone] to schedule an appointment with the appropriate provider.      - Go to the ER if you develop new or worsening symptoms.     - You must understand that you have received an Urgent Care treatment only and that you may be released before all of your medical problems are known or treated.   - You, the patient, will arrange for follow up care as instructed.   - If your condition worsens or fails to improve we recommend that you receive another evaluation at the ER immediately or contact your PCP to discuss your concerns or return here.    ----------------------------------    FACT SHEET FOR PATIENTS, PARENTS, AND CAREGIVERS EMERGENCY USE AUTHORIZATION (EUA) OF PAXLOVID FOR CORONAVIRUS DISEASE 2019 (COVID-19)     You are being given this Fact Sheet because your healthcare provider believes it is necessary to provide you with PAXLOVID for the treatment of mild-to-moderate coronavirus disease (COVID-19) caused by the SARS-CoV-2 virus. This Fact Sheet contains information to help you understand the risks and benefits of taking the PAXLOVID you may receive. This Fact Sheet also contains information about how to take PAXLOVID and how to report side effects or problems with the appearance or packaging of PAXLOVID. The U.S. Food and Drug Administration (FDA) has issued an Emergency Use Authorization (EUA) to make PAXLOVID available for the treatment of mild-to-moderate COVID-19 in adults and children 12 years of age and older weighing at least 88 pounds (40 kg) who are at high risk for progression to severe COVID-19, including hospitalization or death (for more details about an EUA please see What is an Emergency Use Authorization? at the end of this document). Read this Fact Sheet for information about PAXLOVID. Talk to your healthcare provider about your options or if you have any questions. It is your choice to take PAXLOVID. What is COVID-19? COVID-19 is caused by a virus called a coronavirus. You can get COVID-19 through close contact with another person who has the virus. COVID-19 illnesses have ranged from very mild-to-severe, including illness resulting in death. While information so far suggests that most COVID-19 illness is mild, serious illness can happen and may cause some of your other medical conditions to become worse. Older people and people of all ages with severe, long lasting (chronic) medical conditions like heart disease, lung disease, and diabetes, for example seem to be at higher risk of being hospitalized for COVID-19. What is  PAXLOVID? PAXLOVID is a medicine that is available under EUA for the treatment of mild-to-moderate COVID-19 in adults and children 12 years of age and older weighing at least 88 pounds (40 kg) who are at high risk for progression to severe COVID-19, including hospitalization or death. PAXLOVID is FDA-approved for the treatment of COVID-19 in certain adults (see section What other treatment choices are there?). PAXLOVID is not FDA-approved or available under EUA for use in children younger than 12 years of age or weighing less than 88 pounds (40 kg). There is limited information about the safety and effectiveness of using PAXLOVID to treat children younger than 12 years of age or weighing less than 88 pounds (40 kg) with mild-to-moderate COVID-19. 2 Revised: 11/2023 What is the most important information I should know about PAXLOVID? PAXLOVID can interact with other medicines causing severe or life-threatening side effects or death. It is important to know the medicines that should not be taken with PAXLOVID. Do not take PAXLOVID if:  you are taking any of the following medicines: o alfuzosin o amiodarone o apalutamide o carbamazepine o colchicine o dihydroergotamine o dronedarone o eletriptan o eplerenone o ergotamine o finerenone o flecainide o flibanserin o ivabradine o lomitapide o lovastatin o lumacaftor/ivacaftor o lurasidone o methylergonovine o midazolam (oral) o naloxegol o phenobarbital o phenytoin o pimozide o primidone o propafenone o quinidine o ranolazine o rifampin o rifapentine o Nilams Wort (hypericum perforatum) o sildenafil (Revatio®) for pulmonary arterial hypertension o silodosin o simvastatin o tolvaptan o triazolam o ubrogepant o voclosporin These are not the only medicines that may cause serious or life-threatening side effects if taken with PAXLOVID. PAXLOVID may increase or decrease the levels of multiple other medicines. It is very important to tell your healthcare provider about all of  the medicines you are taking because additional laboratory tests or changes in the dose of your other medicines may be necessary during treatment with PAXLOVID. Your healthcare provider may also tell you about specific symptoms to watch out for that may indicate that you need to stop or decrease the dose of some of your other medicines.  you are allergic to nirmatrelvir, ritonavir, or any of the ingredients in PAXLOVID. See the end of this leaflet for a complete list of ingredients in PAXLOVID. See What are the important possible side effects of PAXLOVID? for signs and symptoms of allergic reactions. What should I tell my healthcare provider before I take PAXLOVID? Tell your healthcare provider if you:  have kidney problems. You may need a different dose of PAXLOVID.  have liver problems, including hepatitis.  have Human Immunodeficiency Virus 1 (HIV-1) infection. PAXLOVID may lead to some HIV-1 medicines not working as well in the future. 3 Revised: 11/2023  are pregnant or plan to become pregnant. It is not known if PAXLOVID can harm your unborn baby. Tell your healthcare provider right away if you are or if you become pregnant.  are breastfeeding or plan to breastfeed. It is not known if PAXLOVID can pass into your breast milk. Talk to your healthcare provider about the best way to feed your baby during treatment with PAXLOVID. Some medicines may interact with PAXLOVID and may cause serious side effects.  Tell your healthcare provider about all the medicines you take, including prescription and over-the-counter medicines, vitamins, and herbal supplements.  Your healthcare provider can tell you if it is safe to take PAXLOVID with other medicines.  You can ask your healthcare provider or pharmacist for a list of medicines that interact with PAXLOVID.  Do not start taking a new medicine without telling your healthcare provider. Tell your healthcare provider if you are taking combined birth control (hormonal  contraceptive). PAXLOVID may affect how your hormonal contraceptives work. Females who are able to become pregnant should use another effective alternative form of contraception or an additional barrier method of contraception during treatment with PAXLOVID. Talk to your healthcare provider if you have any questions about contraceptive methods that might be right for you. How do I take PAXLOVID?  Take PAXLOVID exactly as your healthcare provider tells you to take it.  PAXLOVID consists of 2 medicines: nirmatrelvir tablets and ritonavir tablets. The 2 medicines are taken together 2 times each day for 5 days. o Nirmatrelvir is an oval, pink tablet. o Ritonavir is a white or off-white tablet.  You will receive a Dose Pack containing either: o Two-dose blister cards (containing 5 blister cards) or o Single-dose blister cards (containing 10 blister cards).  See Figure A or Figure B below on how to take the PAXLOVID Dose Pack you receive and follow the instructions on how to take it correctly.  If you have kidney disease, your healthcare provider may prescribe a lower dose (see Figure A or Figure B). Talk to your healthcare provider to make sure you receive the correct Dose Pack. 4 Revised: 11/2023 Figure A (Two-dose Blister Card) If you are prescribed PAXLOVID 300 mg; 100 mg Dose Pack: each dose contains 3 tablets. How to take PAXLOVID 300 mg; 100 mg Dose Pack Morning Dose: Take the 2 pink nirmatrelvir tablets and 1 white to off-white ritonavir tablet together at the same time. Evening Dose: Take the 2 pink nirmatrelvir tablets and 1 white to off-white ritonavir tablet together at the same time. 5 Revised: 11/2023 Figure A (Two-dose Blister Card) If you are prescribed PAXLOVID 150 mg; 100 mg Dose Pack: each dose contains 2 tablets. How to take PAXLOVID 150 mg; 100 mg Dose Pack Morning Dose: Take the 1 pink nirmatrelvir tablet and 1 white to off-white ritonavir tablet together at the same time. Evening Dose: Take the 1  pink nirmatrelvir tablet and 1 white to off-white ritonavir tablet together at the same time. 6 Revised: 11/2023 Figure B (Single-dose Blister Card) If you are prescribed PAXLOVID 300 mg; 100 mg Dose Pack: each dose contains 3 tablets. How to take PAXLOVID 300 mg; 100 mg Dose Pack Morning Dose: Take the 2 pink nirmatrelvir tablets and 1 white to off-white ritonavir tablet together. Bedtime Dose: Take the 2 pink nirmatrelvir tablets and 1 white to off-white ritonavir tablet together. 7 Revised: 11/2023 Figure B (Single-dose Blister Card) If you are prescribed PAXLOVID 150 mg; 100 mg Dose Pack: each dose contains 2 tablets. How to take PAXLOVID 150 mg; 100 mg Dose Pack Morning Dose: Take the 1 pink nirmatrelvir tablet and 1 white to off-white ritonavir tablet together. Bedtime Dose: Take the 1 pink nirmatrelvir tablet and 1 white to off-white ritonavir tablet together. 8 Revised: 11/2023  Do not remove your PAXLOVID tablets from the blister card before you are ready to take your dose. o Take your first dose of PAXLOVID in the morning or evening, depending on when you  your prescription, or as your healthcare provider tells you to.  Swallow the tablets whole. Do not chew, break, or crush the tablets.  Take PAXLOVID with or without food.  Do not stop taking PAXLOVID without talking to your healthcare provider, even if you feel better.  If you miss a dose of PAXLOVID within 8 hours of the time it is usually taken, take it as soon as you remember. If you miss a dose by more than 8 hours, skip the missed dose and take the next dose at your regular time. Do not take 2 doses of PAXLOVID at the same time.  If you take too much PAXLOVID, call your healthcare provider or go to the nearest hospital emergency room right away.  If you are taking a ritonavir- or cobicistat-containing medicine to treat hepatitis C or HIV-1 infection, you should continue to take your medicine as prescribed by your healthcare provider. Talk to  your healthcare provider if you do not feel better or if you feel worse after 5 days. What are the important possible side effects of PAXLOVID? PAXLOVID may cause serious side effects, including:  Allergic reactions, including severe allergic reactions (anaphylaxis) have happened during treatment with PAXLOVID. Stop taking PAXLOVID and get medical help right away if you get any of the following symptoms of an allergic reaction: o skin rash, hives, blisters or peeling skin o painful sores or ulcers in the mouth, nose, throat or genital area o swelling of the mouth, lips, tongue or face o trouble swallowing or breathing o throat tightness o hoarseness  Liver Problems. Tell your healthcare provider right away if you get any of the following signs and symptoms of liver problems during treatment with PAXLOVID: o loss of appetite o yellowing of your skin and the white of eyes o dark-colored urine o pale colored stools o itchy skin o stomach-area (abdominal) pain 9 Revised: 11/2023 The most common side effects of PAXLOVID include: altered sense of taste and diarrhea. Other possible side effects include: o headache o vomiting o abdominal pain o nausea o high blood pressure o feeling generally unwell These are not all of the possible side effects of PAXLOVID. For more information, ask your healthcare provider or pharmacist. What other treatment choices are there? PAXLOVID is FDA-approved for the treatment of mild-to-moderate COVID-19 in certain adults; however, there are not sufficient quantities of the approved presentations (i.e., dose packs) of PAXLOVID at this time. This EUA continues to authorize the emergency use of PAXLOVID for the approved patient population to ensure continued access in order to meet the public health need. VEKLURY (remdesivir) is FDA-approved for the treatment of mild-to-moderate COVID-19 in certain adults and children. Talk with your healthcare provider to see if VEKLURY is appropriate for you.  For information on the emergency use of other medicines that are authorized by FDA to treat people with COVID-19, please go to https://www.fda.gov/emergencypreparedness-and-response/mcm-legal-regulatory-and-policy-framework/emergencyuse-authorization. Your healthcare provider may talk with you about clinical trials for which you may be eligible. It is your choice to be treated or not to be treated with PAXLOVID. Should you decide not to receive it or for your child not to receive it, it will not change your standard medical care. What if I am pregnant or breastfeeding? There is limited experience treating pregnant women or breastfeeding mothers with PAXLOVID. For a mother and unborn baby, the benefit of taking PAXLOVID may be greater than the risk from the treatment. If you are pregnant, discuss your options and specific situation with your healthcare provider. If you are breastfeeding, discuss your options and specific situation with your healthcare provider. 10 Revised: 11/2023 How do I report side effects or problems with the appearance or packaging of PAXLOVID? Contact your healthcare provider if you have any side effects that bother you or do not go away. Report side effects or problems with the appearance or packaging of PAXLOVID (see Figures A and B above for examples of PAXLOVID Dose Packs) to FDA MedWatch at www.fda.gov/medwatch or call 2-129-AVV-4221 or you can report side effects to [employer] at the contact information provided below. Website Fax number Telephone number [facility] [phone] How should I store PAXLOVID? Store PAXLOVID tablets at room temperature, between 68?F to 77?F (20?C to 25?C). Keep PAXLOVID and all medicines out of the reach of children. What if I have questions about the expiration date for my PAXLOVID? The FDA has extended the expiration date (shelf-life) for some lots of PAXLOVID. To find the extended expiration date, enter the lot number  found on the side of carton or bottom of blister pack at this website: [email or web address] or talk with your healthcare provider. Information on the authorized shelf-life extensions for PAXLOVID may also be found at https://www.fda.gov/emergency-preparedness-andresponse/mcm-legal-regulatory-and-policy-framework/expiration-dating-extension. How can I learn more about COVID-19?  Ask your healthcare provider.  Visit https://www.cdc.gov/COVID19.  Contact your local or state public health department. What is an Emergency Use Authorization (EUA)? The [place] FDA has made PAXLOVID available under an emergency access mechanism called an Emergency Use Authorization (EUA). The EUA is supported by a  of Health and Human Services ([facility]) declaration that circumstances exist to justify the emergency use of drugs and biological products during the COVID-19 pandemic. In issuing an EUA, the FDA has determined, among other things, that based on the total amount of scientific evidence available including data from adequate and well-controlled clinical trials, if available, it is reasonable to believe that the product may be effective for diagnosing, treating, or preventing COVID-19, or a serious or life-threatening disease or condition caused by COVID-19; that the known and potential benefits of the product, when used to diagnose, treat, or prevent such disease or 11 Revised: 11/2023 condition, outweigh the known and potential risks of such product; and that there are no adequate, approved, and available alternatives. All of these criteria must be met to allow for the product to be available under an EUA. The EUA for PAXLOVID is in effect for the duration of the COVID-19 declaration justifying emergency use of this product, unless the relevant EUA declaration is terminated or the EUA revoked (after which the products may no longer be used under the EUA). What are the ingredients in PAXLOVID? Active  ingredient: nirmatrelvir and ritonavir Nirmatrelvir inactive ingredients: colloidal silicon dioxide, croscarmellose sodium, lactose monohydrate, microcrystalline cellulose, and sodium stearyl fumarate. Film-coating contains: hydroxy propyl methylcellulose, iron oxide red, polyethylene glycol, and titanium dioxide. Ritonavir inactive ingredients: anhydrous dibasic calcium phosphate, colloidal silicon dioxide, copovidone, sodium stearyl fumarate, and sorbitan monolaurate. The film coating may contain: colloidal anhydrous silica, colloidal silicon dioxide, hydroxypropyl cellulose, hypromellose, polyethylene glycol, polysorbate 80, talc, and titanium dioxide. Additional Information For general questions, visit the website or call the telephone number provided below. Website Telephone number [email or web address] [phone] ([phone]) [id number] Revised: 11/2023

## 2023-12-27 ENCOUNTER — OFFICE VISIT (OUTPATIENT)
Dept: URGENT CARE | Facility: CLINIC | Age: 63
End: 2023-12-27
Payer: COMMERCIAL

## 2023-12-27 VITALS
OXYGEN SATURATION: 96 % | BODY MASS INDEX: 30.76 KG/M2 | SYSTOLIC BLOOD PRESSURE: 148 MMHG | HEIGHT: 67 IN | RESPIRATION RATE: 18 BRPM | WEIGHT: 196 LBS | HEART RATE: 107 BPM | TEMPERATURE: 98 F | DIASTOLIC BLOOD PRESSURE: 95 MMHG

## 2023-12-27 DIAGNOSIS — R05.1 ACUTE COUGH: ICD-10-CM

## 2023-12-27 DIAGNOSIS — U07.1 COVID-19 VIRUS INFECTION: Primary | ICD-10-CM

## 2023-12-27 PROCEDURE — 71046 XR CHEST PA AND LATERAL: ICD-10-PCS | Mod: FY,S$GLB,, | Performed by: RADIOLOGY

## 2023-12-27 PROCEDURE — 71046 X-RAY EXAM CHEST 2 VIEWS: CPT | Mod: FY,S$GLB,, | Performed by: RADIOLOGY

## 2023-12-27 PROCEDURE — 99213 PR OFFICE/OUTPT VISIT, EST, LEVL III, 20-29 MIN: ICD-10-PCS | Mod: S$GLB,,, | Performed by: NURSE PRACTITIONER

## 2023-12-27 PROCEDURE — 99213 OFFICE O/P EST LOW 20 MIN: CPT | Mod: S$GLB,,, | Performed by: NURSE PRACTITIONER

## 2023-12-27 RX ORDER — PROMETHAZINE HYDROCHLORIDE AND DEXTROMETHORPHAN HYDROBROMIDE 6.25; 15 MG/5ML; MG/5ML
5 SYRUP ORAL EVERY 8 HOURS PRN
Qty: 118 ML | Refills: 0 | Status: SHIPPED | OUTPATIENT
Start: 2023-12-27 | End: 2024-01-06

## 2023-12-27 RX ORDER — ALBUTEROL SULFATE 90 UG/1
2 AEROSOL, METERED RESPIRATORY (INHALATION) EVERY 6 HOURS PRN
Qty: 18 G | Refills: 0 | Status: SHIPPED | OUTPATIENT
Start: 2023-12-27 | End: 2024-12-26

## 2023-12-27 NOTE — PROGRESS NOTES
"Subjective:      Patient ID: David Nicole Jr. is a 63 y.o. male.    Vitals:  height is 5' 7" (1.702 m) and weight is 88.9 kg (196 lb). His oral temperature is 98.3 °F (36.8 °C). His blood pressure is 148/95 (abnormal) and his pulse is 107. His respiration is 18 and oxygen saturation is 96%.     Chief Complaint: Cough    63 y.o. male who presents today with a chief complaint of  cough, headache, postnasal drip, and a sore throat.  History of positive Covid-19 12/18/2023 with continued cough.  States cough has progressively worsened.  Denies SOB, chest pain, fever, or chills.   History of Bronchitis.    Cough  The current episode started 1 to 4 weeks ago. The problem has been waxing and waning. The cough is Productive of sputum. Associated symptoms include headaches, nasal congestion, postnasal drip and a sore throat. Pertinent negatives include no chest pain, chills, ear congestion, ear pain, fever, heartburn, hemoptysis, myalgias, rash, rhinorrhea, shortness of breath, sweats, weight loss or wheezing. Nothing aggravates the symptoms. He has tried prescription cough suppressant and rest for the symptoms. The treatment provided mild relief. His past medical history is significant for pneumonia. There is no history of asthma, bronchitis or COPD.       Constitution: Negative for chills and fever.   HENT:  Positive for postnasal drip and sore throat. Negative for ear pain, congestion, sinus pain and sinus pressure.    Cardiovascular:  Negative for chest pain and palpitations.   Respiratory:  Positive for cough and sputum production. Negative for chest tightness, bloody sputum, COPD, shortness of breath, stridor, wheezing and asthma.    Gastrointestinal:  Negative for heartburn.   Musculoskeletal:  Negative for muscle ache.   Skin:  Negative for rash.   Allergic/Immunologic: Negative for asthma.   Neurological:  Positive for headaches. Negative for history of migraines.      Objective:     Physical Exam "   Constitutional: He is oriented to person, place, and time. He appears well-developed. He is cooperative.  Non-toxic appearance. He does not appear ill. No distress.   HENT:   Head: Normocephalic and atraumatic.   Ears:   Right Ear: Hearing, tympanic membrane, external ear and ear canal normal. impacted cerumen  Left Ear: Hearing, tympanic membrane, external ear and ear canal normal. impacted cerumen  Nose: Nose normal. No mucosal edema, rhinorrhea, nasal deformity or congestion. No epistaxis. Right sinus exhibits no maxillary sinus tenderness and no frontal sinus tenderness. Left sinus exhibits no maxillary sinus tenderness and no frontal sinus tenderness.   Mouth/Throat: Uvula is midline, oropharynx is clear and moist and mucous membranes are normal. No trismus in the jaw. Normal dentition. No uvula swelling. No oropharyngeal exudate, posterior oropharyngeal edema or posterior oropharyngeal erythema.   Eyes: Conjunctivae and lids are normal. No scleral icterus.   Neck: Trachea normal and phonation normal. Neck supple. No edema present. No erythema present. No neck rigidity present.   Cardiovascular: Normal rate, regular rhythm, normal heart sounds and normal pulses.   Pulmonary/Chest: Effort normal. No stridor. No respiratory distress. He has no decreased breath sounds. He has no wheezes. He has no rhonchi. He has rales in the right upper field and the left upper field. He exhibits no tenderness.   Abdominal: Normal appearance.   Musculoskeletal: Normal range of motion.         General: No deformity. Normal range of motion.   Neurological: He is alert and oriented to person, place, and time. He exhibits normal muscle tone. Coordination normal.   Skin: Skin is warm, dry, intact, not diaphoretic and not pale.   Psychiatric: His speech is normal and behavior is normal. Judgment and thought content normal.   Nursing note and vitals reviewed.      Assessment:     1. COVID-19 virus infection    2. Acute cough     EXAMINATION:  XR CHEST PA AND LATERAL     TECHNIQUE:  Two views of the chest were obtained, with PA and lateral projections submitted.     COMPARISON:  Comparison is made to 11/21/2019, prior chest rate.  Clinical information of cough, and that the patient tested positive for COVID-19 on today's date.     FINDINGS:  Heart size is normal, as is the appearance of the pulmonary vascularity.  Linear opacity consistent with parenchymal scarring is noted in the left lower lung zone.  With this minor exception, the lung zones appear clear, and are free of significant airspace consolidation or volume loss.  No pleural fluid.  No hilar or mediastinal mass lesion.  No pneumothorax.     Impression:     Minor area of parenchymal scarring in the left lower lung zone, but no significant detrimental interval change in the appearance of the chest since 11/21/2019 is appreciated.  No conventional radiographic abnormalities to specifically suggest COVID-19 pulmonary involvement are appreciated.        Electronically signed by: David Israel MD  Date:                                            12/27/2023  Time:                                           09:52    Plan:     COVID-19 virus infection  -     XR CHEST PA AND LATERAL; Future; Expected date: 12/27/2023    Acute cough  -     XR CHEST PA AND LATERAL; Future; Expected date: 12/27/2023  -     albuterol (VENTOLIN HFA) 90 mcg/actuation inhaler; Inhale 2 puffs into the lungs every 6 (six) hours as needed for Wheezing. Rescue  Dispense: 18 g; Refill: 0  -     promethazine-dextromethorphan (PROMETHAZINE-DM) 6.25-15 mg/5 mL Syrp; Take 5 mLs by mouth every 8 (eight) hours as needed (cough).  Dispense: 118 mL; Refill: 0    PATIENT WILL OBTAIN CHEST X-RAY AT [facility] TODAY.  DIRECTIONS GIVEN TO LOCATION. YOU WILL BE CONTACTED WITH RESULTS WITH FURTHER INSTRUCTIONS; PATIENT VERBALIZED HIS UNDERSTANDING.               Additional MDM:     Heart Failure Score:   COPD =  No

## 2023-12-27 NOTE — PATIENT INSTRUCTIONS
PATIENT WILL OBTAIN CHEST X-RAY AT [facility] TODAY.  DIRECTIONS GIVEN TO LOCATION. YOU WILL BE CONTACTED WITH RESULTS WITH FURTHER INSTRUCTIONS; PATIENT VERBALIZED HIS UNDERSTANDING.    POSITIVE COVID TEST    You have tested positive for COVID-19 today.           ISOLATION    If you tested positive and do not have symptoms, you must isolate for 5 days starting on the day of the positive test. I         If you tested positive and have symptoms, you must isolate for 5 days starting on the day of the first symptoms,  not the day of the positive test.         This is the most important part, both the CDC and the [facility] emphasize that you do not test out of isolation.         After 5 days, if your symptoms have improved and you have not had fever on day 5, you can return to the community on day 6- NO TESTING REQUIRED!          In fact, we do not retest if you were positive in the last 90 days.         After your 5 days of isolation are completed, the CDC recommends strict mask use for the first 5 days that you come out of isolation.      When to Seek Emergency Medical Attention  Look for emergency warning signs* for COVID-19. If someone is showing any of these signs, seek emergency medical care immediately:    Trouble breathing  Persistent pain or pressure in the chest  New confusion  Inability to wake or stay awake  Pale, gray, or blue-colored skin, lips, or nail beds, depending on skin tone  *This list is not all possible symptoms. Please call your medical provider for any other symptoms that are severe or concerning to you.    Call 911 or call ahead to your local emergency facility: Notify the  that you are seeking care for someone who has or may have COVID-19.       For up to date guidelines please visit www.cdc.gov  If you have any questions you may call [facility] line [phone]    What advice should be given to patients with known or presumed COVID-19 managed at home?    For  most patients with COVID-19 who are managed at home, we advise the following:    ?Supportive care with antipyretics/analgesics (eg, acetaminophen) and hydration    ?Close contact with their health care provider    ?Monitoring for clinical worsening, particularly the development of new or worsening dyspnea, which should prompt clinical evaluation and possible hospitalization    ?Separation from other household members, including pets (eg, staying in a separate room when possible and wearing a mask when in the same room)    ?Frequent hand washing for all family members    ?Frequent disinfection of commonly touched surfaces    Some patients with cough or dyspnea may experience symptomatic improvement with self-proning (resting in the prone rather than the supine position)    All other care is generally supportive, similar to that advised for other acute viral illnesses:    ?We advise that patients stay well hydrated, particularly those patients with sustained or higher fevers, in whom insensible fluid losses may be significant.    ?Cough that is persistent, interferes with sleep, or causes discomfort can be managed with an over-the-counter cough medication (eg, dextromethorphan) or prescription benzonatate, 100 to 200 mg orally three times daily as needed.    ?We advise rest as needed during the acute illness; for patients without hypoxia, frequent repositioning and ambulation is encouraged. In addition, we encourage all patients to advance activity as soon as tolerated during recovery.      Additional instructions:  Separate yourself from other people and animals in your home.  Call ahead before visiting your doctor.  Wear a facemask when around others.  Cover your coughs and sneezes.  Wash your hands often with soap and water; hand  can be used, too.  Avoid sharing personal household items.  Wipe down surfaces used daily.  Monitor your symptoms. Seek prompt medical attention if your illness is worsening  (e.g., difficulty breathing).   Before seeking care, call your healthcare provider.  If you have a medical emergency and need to call 911, notify the dispatch personnel that you have, or are being evaluated for COVID-19. If possible, put on a facemask before emergency medical services arrive.      Contact Tracing for patients who have been vaccinated and agree to sequencing.    As one of the next steps, you will receive a call or text from the [place] Department of Health ([facility]) COVID-19 Tracing Team. See the contact information below so you know not to ignore the health departments call. It is important that you contact them back immediately so they can help.      Contact Tracer Number:  [phone]  Caller ID for most carriers: [place] Dept Health     What is contact tracing?  Contact tracing is a process that helps identify everyone who has been in close contact with an infected person. Contact tracers let those people know they may have been exposed and guide them on next steps. Confidentiality is important for everyone; no one will be told who may have exposed them to the virus.  Your involvement is important. The more we know about where and how this virus is spreading, the better chance we have at stopping it from spreading further.  What does exposure mean?  Exposure means you have been within 6 feet for more than 15 minutes with a person who has or had COVID-19.  What kind of questions do the contact tracers ask?  A contact tracer will confirm your basic contact information including name, address, phone number, and next of kin, as well as asking about any symptoms you may have had. Theyll also ask you how you think you may have gotten sick, such as places where you may have been exposed to the virus, and people you were with. Those names will never be shared with anyone outside of that call, and will only be used to help trace and stop the spread of the virus.   I have privacy concerns. How will the state  use my information?  Your privacy about your health is important. All calls are completed using call centers that use the appropriate health privacy protection measures (HIPAA compliance), meaning that your patient information is safe. No one will ever ask you any questions related to immigration status. Your health comes first.   Do I have to participate?  You do not have to participate, but we strongly encourage you to. Contact tracing can help us catch and control new outbreaks as theyre developing to keep your friends and family safe.   What if I dont hear from anyone?  If you dont receive a call within 24 hours, you can call the number above right away to inquire about your status. That line is open from 8:00 am - 8:00 p.m., 7 days a week.  Contact tracing saves lives! Together, we have the power to beat this virus and keep our loved ones and neighbors safe.    For more information see CDC link below.      https://www.cdc.gov/coronavirus/2019-ncov/hcp/guidance-prevent-spread.html#precautions        Sources:  [facility], [place] [facility]   You must understand that you've received an Urgent Care treatment only and that you may be released before all your medical problems are known or treated. You, the patient, will arrange for follow up care as instructed.  Follow up with your PCP or specialty clinic as directed in the next 1-2 weeks if not improved or as needed.  You can call [phone] to schedule an appointment with the appropriate provider.  If your condition worsens we recommend that you receive another evaluation at the emergency room immediately or contact your primary medical clinics after hours call service to discuss your concerns.  Please return here or go to the Emergency Department for any concerns or worsening of condition.    If you were prescribed a narcotic or controlled medication, do not drive or operate heavy equipment or machinery while taking these  medications.    Thank you for choosing [facility]!    Our goal in the Urgent Care is to always provide outstanding medical care. You may receive a survey by mail or e-mail in the next week regarding your experience today. We would greatly appreciate you completing and returning the survey. Your feedback provides us with a way to recognize our staff who provide very good care, and it helps us learn how to improve when your experience was below our aspiration of excellence.      We appreciate you trusting us with your medical care. We hope you feel better soon. We will be happy to take care of you for all of your future medical needs.   This note was prepared using voice-recognition software.  Although efforts are made to proofread the note, some errors may persist in the final document.     Sincerely,    Alfred Jain DNP, FNP-C

## 2024-01-19 ENCOUNTER — TELEPHONE (OUTPATIENT)
Dept: ENDOSCOPY | Facility: HOSPITAL | Age: 64
End: 2024-01-19
Payer: COMMERCIAL

## 2024-01-19 NOTE — TELEPHONE ENCOUNTER
Left voicemail and sent portal message for patient to call Endoscopy Scheduling to review instructions and confirm appointment for Colonoscopy on 1/26/24.

## 2024-01-22 ENCOUNTER — ANESTHESIA EVENT (OUTPATIENT)
Dept: ENDOSCOPY | Facility: HOSPITAL | Age: 64
End: 2024-01-22
Payer: COMMERCIAL

## 2024-01-22 NOTE — ANESTHESIA PREPROCEDURE EVALUATION
01/22/2024  David Nicole Jr. is a 63 y.o., male.  Past Medical History:   Diagnosis Date    Anxiety     Arthritis     Baker's cyst     Diabetes mellitus, type 2     H/O peptic ulcer 02/27/2018    H/O type 2 diabetes mellitus 02/27/2018    Hyperlipidemia     Hypertension     Insomnia     Intractable migraine with aura with status migrainosus 08/17/2016    GONB (Greater Occipital Nerve Block):2016    Joint pain     Knee pain     HILARIA (obstructive sleep apnea)     Prediabetes        Past Surgical History:   Procedure Laterality Date    BACK SURGERY      COLONOSCOPY N/A 2/13/2017    Procedure: COLONOSCOPY;  Surgeon: Rich James MD;  Location: [facility] ([address] [place] [address]);  Service: Endoscopy;  Laterality: N/A;    SPINE SURGERY      TONSILLECTOMY      VASECTOMY      WISDOM TOOTH EXTRACTION         Family History   Problem Relation Age of Onset    Headaches Mother     Heart disease Father        Social History     Socioeconomic History    Marital status:    Tobacco Use    Smoking status: Never     Passive exposure: Never    Smokeless tobacco: Never   Substance and Sexual Activity    Alcohol use: Yes     Comment: occasional     Drug use: No    Sexual activity: Yes   Social History Narrative    Maintenance/ at school.        No current facility-administered medications for this encounter.     Current Outpatient Medications   Medication Sig Dispense Refill    albuterol (VENTOLIN HFA) 90 mcg/actuation inhaler Inhale 2 puffs into the lungs every 6 (six) hours as needed for Wheezing. Rescue 18 g 0    ascorbic acid, vitamin C, (VITAMIN C) 100 MG tablet Take 100 mg by mouth once daily.      atorvastatin (LIPITOR) 80 MG tablet Take 1 tablet (80 mg total) by mouth every evening. 90 tablet 3    buPROPion (WELLBUTRIN XL) 300 MG 24 hr tablet Take 1 tablet (300 mg total) by mouth once daily. 90 tablet 3    CYANOCOBALAMIN,  VITAMIN B-12, (VITAMIN B-12 ORAL) Take by mouth.      ferrous sulfate 325 (65 FE) MG EC tablet Take 325 mg by mouth once daily.      FLUoxetine 40 MG capsule Take 2 capsules (80 mg total) by mouth every morning. 180 capsule 3    fluticasone propionate (FLONASE) 50 mcg/actuation nasal spray 2 sprays (100 mcg total) by Each Nostril route once daily. 48 g 3    losartan (COZAAR) 100 MG tablet Take 1 tablet (100 mg total) by mouth once daily. 90 tablet 3    multivitamin (THERAGRAN) per tablet Take 1 tablet by mouth once daily.      omeprazole (PRILOSEC) 20 MG capsule TAKE 1 CAPSULE BY MOUTH  EVERY MORNING TO CONTROL  GERD 90 capsule 3    ondansetron (ZOFRAN-ODT) 8 MG TbDL Take 1 tablet (8 mg total) by mouth every 24 hours as needed (nausea). 30 tablet 1    semaglutide (OZEMPIC) 1 mg/dose (4 mg/3 mL) Inject 1 mg into the skin every 7 days. 12 mL 3    verapamiL (VERELAN) 240 MG C24P Take 1 capsule (240 mg total) by mouth once daily. 90 capsule 3    vitamin D (VITAMIN D3) 1000 units Tab Take 1,000 Units by mouth once daily.         Review of patient's allergies indicates:  No Known Allergies     Pre-op Assessment    I have reviewed the Patient Summary Reports.     I have reviewed the Nursing Notes. I have reviewed the NPO Status.   I have reviewed the Medications.     Review of Systems  Anesthesia Hx:  No problems with previous Anesthesia             Denies Family Hx of Anesthesia complications.    Denies Personal Hx of Anesthesia complications.                    Social:  Non-Smoker, No Alcohol Use       Hematology/Oncology:  Hematology Normal   Oncology Normal                                   EENT/Dental:  EENT/Dental Normal           Cardiovascular:     Hypertension                                        Pulmonary:        Sleep Apnea                Renal/:  Renal/ Normal                 Hepatic/GI:    Hiatal Hernia, GERD             Musculoskeletal:  Musculoskeletal Normal                Neurological:     Neuromuscular Disease,  Headaches                                 Endocrine:  Diabetes           Dermatological:  Skin Normal    Psych:  Psychiatric History                  Physical Exam  General: Well nourished, Cooperative, Alert and Oriented    Airway:  Mallampati: II   Mouth Opening: Normal  TM Distance: Normal  Tongue: Normal  Neck ROM: Normal ROM    Dental:  Intact    Chest/Lungs:  Clear to auscultation, Normal Respiratory Rate    Heart:  Rate: Normal  Rhythm: Regular Rhythm  Sounds: Normal    Abdomen:  Normal        Anesthesia Plan  Type of Anesthesia, risks & benefits discussed:    Anesthesia Type: Gen Natural Airway  Intra-op Monitoring Plan: Standard ASA Monitors  Post Op Pain Control Plan: multimodal analgesia and IV/PO Opioids PRN  Induction:  IV  Informed Consent: Informed consent signed with the Patient and all parties understand the risks and agree with anesthesia plan.  All questions answered. Patient consented to blood products? No  ASA Score: 2  Day of Surgery Review of History & Physical: H&P Update referred to the surgeon/provider.I have interviewed and examined the patient. I have reviewed the patient's H&P dated: There are no significant changes.     Ready For Surgery From Anesthesia Perspective.     .

## 2024-01-26 ENCOUNTER — ANESTHESIA (OUTPATIENT)
Dept: ENDOSCOPY | Facility: HOSPITAL | Age: 64
End: 2024-01-26
Payer: COMMERCIAL

## 2024-01-26 ENCOUNTER — HOSPITAL ENCOUNTER (OUTPATIENT)
Facility: HOSPITAL | Age: 64
Discharge: HOME | End: 2024-01-26
Attending: INTERNAL MEDICINE | Admitting: INTERNAL MEDICINE
Payer: COMMERCIAL

## 2024-01-26 VITALS
BODY MASS INDEX: 30.13 KG/M2 | SYSTOLIC BLOOD PRESSURE: 158 MMHG | WEIGHT: 192 LBS | HEIGHT: 67 IN | TEMPERATURE: 98 F | DIASTOLIC BLOOD PRESSURE: 88 MMHG | RESPIRATION RATE: 17 BRPM | OXYGEN SATURATION: 98 % | HEART RATE: 89 BPM

## 2024-01-26 DIAGNOSIS — Z12.11 ENCOUNTER FOR COLORECTAL CANCER SCREENING: ICD-10-CM

## 2024-01-26 DIAGNOSIS — Z86.010 HISTORY OF COLON POLYPS: Primary | ICD-10-CM

## 2024-01-26 DIAGNOSIS — Z12.12 ENCOUNTER FOR COLORECTAL CANCER SCREENING: ICD-10-CM

## 2024-01-26 LAB — POCT GLUCOSE: 106 MG/DL (ref 70–110)

## 2024-01-26 PROCEDURE — 25000003 PHARM REV CODE 250: Performed by: NURSE ANESTHETIST, CERTIFIED REGISTERED

## 2024-01-26 PROCEDURE — 99900035 HC TECH TIME PER 15 MIN (STAT)

## 2024-01-26 PROCEDURE — 37000008 HC ANESTHESIA 1ST 15 MINUTES: Performed by: INTERNAL MEDICINE

## 2024-01-26 PROCEDURE — 37000009 HC ANESTHESIA EA ADD 15 MINS: Performed by: INTERNAL MEDICINE

## 2024-01-26 PROCEDURE — D9220A PRA ANESTHESIA: Mod: ,,, | Performed by: NURSE ANESTHETIST, CERTIFIED REGISTERED

## 2024-01-26 PROCEDURE — 63600175 PHARM REV CODE 636 W HCPCS: Performed by: ANESTHESIOLOGY

## 2024-01-26 PROCEDURE — 82962 GLUCOSE BLOOD TEST: CPT | Performed by: INTERNAL MEDICINE

## 2024-01-26 PROCEDURE — 25000003 PHARM REV CODE 250: Performed by: ANESTHESIOLOGY

## 2024-01-26 PROCEDURE — G0121 COLON CA SCRN NOT HI RSK IND: HCPCS | Mod: 74 | Performed by: INTERNAL MEDICINE

## 2024-01-26 PROCEDURE — 94761 N-INVAS EAR/PLS OXIMETRY MLT: CPT

## 2024-01-26 PROCEDURE — G0121 COLON CA SCRN NOT HI RSK IND: HCPCS | Mod: 53,,, | Performed by: INTERNAL MEDICINE

## 2024-01-26 RX ORDER — SODIUM CHLORIDE 9 MG/ML
INJECTION, SOLUTION INTRAVENOUS CONTINUOUS
Status: DISCONTINUED | OUTPATIENT
Start: 2024-01-26 | End: 2024-01-26 | Stop reason: HOSPADM

## 2024-01-26 RX ORDER — PROPOFOL 10 MG/ML
VIAL (ML) INTRAVENOUS
Status: DISCONTINUED | OUTPATIENT
Start: 2024-01-26 | End: 2024-01-26

## 2024-01-26 RX ORDER — LIDOCAINE HYDROCHLORIDE 20 MG/ML
INJECTION INTRAVENOUS
Status: DISCONTINUED | OUTPATIENT
Start: 2024-01-26 | End: 2024-01-26

## 2024-01-26 RX ORDER — PROPOFOL 10 MG/ML
VIAL (ML) INTRAVENOUS CONTINUOUS PRN
Status: DISCONTINUED | OUTPATIENT
Start: 2024-01-26 | End: 2024-01-26

## 2024-01-26 RX ADMIN — PROPOFOL 100 MG: 10 INJECTION, EMULSION INTRAVENOUS at 09:01

## 2024-01-26 RX ADMIN — LIDOCAINE HYDROCHLORIDE 100 MG: 20 INJECTION INTRAVENOUS at 09:01

## 2024-01-26 RX ADMIN — PROPOFOL 150 MCG/KG/MIN: 10 INJECTION, EMULSION INTRAVENOUS at 09:01

## 2024-01-26 RX ADMIN — SODIUM CHLORIDE: 0.9 INJECTION, SOLUTION INTRAVENOUS at 08:01

## 2024-01-26 NOTE — PLAN OF CARE
Pt arrived to recovery dosc via stretcher per ENDO team. Bedside report received. Pt attached to bedside monitor. VSS. Pt sedated post procedure. Pt on room air; oxygen sats __94__%. Pt IV access CDI and infusing. Warm blanket applied.Will continue to monitor.

## 2024-01-26 NOTE — H&P
Short Stay Endoscopy History and Physical    PCP - Estelle Parker MD    Procedure - Colonoscopy  Sedation: GA  ASA - per anesthesia  Mallampati - per anesthesia  History of Anesthesia problems - no  Family history Anesthesia problems -  no     HPI:  This is a 63 y.o. male here for evaluation of : History of colon polyps    Reflux - no  Dysphagia - no  Abdominal pain - no  Diarrhea - no    ROS:  Constitutional: No fevers, chills, No weight loss  ENT: No allergies  CV: No chest pain  Pulm: No cough, No shortness of breath  Ophtho: No vision changes  GI: see HPI  Medical History:  has a past medical history of Anxiety, Arthritis, Baker's cyst, Diabetes mellitus, type 2, H/O peptic ulcer (02/27/2018), H/O type 2 diabetes mellitus (02/27/2018), Hyperlipidemia, Hypertension, Insomnia, Intractable migraine with aura with status migrainosus (08/17/2016), Joint pain, Knee pain, HILARIA (obstructive sleep apnea), and Prediabetes.    Surgical History:  has a past surgical history that includes Back surgery; Tonsillectomy; Devine tooth extraction; Vasectomy; Colonoscopy (N/A, 2/13/2017); and Spine surgery.    Family History: family history includes Headaches in his mother; Heart disease in his father.. Otherwise no colon cancer, inflammatory bowel disease, or GI malignancies.    Social History:  reports that he has never smoked. He has never been exposed to tobacco smoke. He has never used smokeless tobacco. He reports current alcohol use. He reports that he does not use drugs.    Review of patient's allergies indicates:  No Known Allergies    Medications:   Medications Prior to Admission   Medication Sig Dispense Refill Last Dose    albuterol (VENTOLIN HFA) 90 mcg/actuation inhaler Inhale 2 puffs into the lungs every 6 (six) hours as needed for Wheezing. Rescue 18 g 0     ascorbic acid, vitamin C, (VITAMIN C) 100 MG tablet Take 100 mg by mouth once daily.       atorvastatin (LIPITOR) 80 MG tablet Take 1 tablet (80 mg total) by  mouth every evening. 90 tablet 3     buPROPion (WELLBUTRIN XL) 300 MG 24 hr tablet Take 1 tablet (300 mg total) by mouth once daily. 90 tablet 3     CYANOCOBALAMIN, VITAMIN B-12, (VITAMIN B-12 ORAL) Take by mouth.       ferrous sulfate 325 (65 FE) MG EC tablet Take 325 mg by mouth once daily.       FLUoxetine 40 MG capsule Take 2 capsules (80 mg total) by mouth every morning. 180 capsule 3     fluticasone propionate (FLONASE) 50 mcg/actuation nasal spray 2 sprays (100 mcg total) by Each Nostril route once daily. 48 g 3     losartan (COZAAR) 100 MG tablet Take 1 tablet (100 mg total) by mouth once daily. 90 tablet 3     multivitamin (THERAGRAN) per tablet Take 1 tablet by mouth once daily.       omeprazole (PRILOSEC) 20 MG capsule TAKE 1 CAPSULE BY MOUTH  EVERY MORNING TO CONTROL  GERD 90 capsule 3     ondansetron (ZOFRAN-ODT) 8 MG TbDL Take 1 tablet (8 mg total) by mouth every 24 hours as needed (nausea). 30 tablet 1     semaglutide (OZEMPIC) 1 mg/dose (4 mg/3 mL) Inject 1 mg into the skin every 7 days. 12 mL 3     verapamiL (VERELAN) 240 MG C24P Take 1 capsule (240 mg total) by mouth once daily. 90 capsule 3     vitamin D (VITAMIN D3) 1000 units Tab Take 1,000 Units by mouth once daily.          Objective Findings:    Vital Signs: Per nursing notes.    Physical Exam:  General Appearance: Well appearing in no acute distress  Head:   Normocephalic, without obvious abnormality  Eyes:    No scleral icterus  Airway: Open  Neck: No restriction in mobility  Lungs: CTA bilaterally in anterior and posterior fields, no wheezes, no crackles.  Heart:  Regular rate and rhythm, S1, S2 normal, no murmurs heard  Abdomen: Soft, non tender, non distended      Labs:  Lab Results   Component Value Date    WBC 9.47 07/07/2023    HGB 15.0 07/07/2023    HCT 46.6 07/07/2023     07/07/2023    CHOL 106 (L) 07/07/2023    TRIG 53 07/07/2023    HDL 36 (L) 07/07/2023    ALT 18 07/07/2023    AST 21 07/07/2023     07/07/2023    K 4.3  07/07/2023     07/07/2023    CREATININE 0.8 07/07/2023    BUN 14 07/07/2023    CO2 26 07/07/2023    TSH 1.370 07/07/2023    PSA 0.85 07/07/2023    HGBA1C 5.3 07/07/2023         I have explained the risks and benefits of endoscopy procedures to the patient including but not limited to bleeding, perforation, infection, and death.    Thank you so much for allowing me to participate in the care of David Contreras MD

## 2024-01-26 NOTE — ANESTHESIA POSTPROCEDURE EVALUATION
Anesthesia Post Evaluation    Patient: David Nicole JrJeffery    Procedure(s) Performed: Procedure(s) (LRB):  COLONOSCOPY (N/A)    Final Anesthesia Type: general      Patient location during evaluation: PACU  Patient participation: Yes- Able to Participate  Level of consciousness: awake and alert  Post-procedure vital signs: reviewed and stable  Pain management: adequate  Airway patency: patent    PONV status at discharge: No PONV  Anesthetic complications: no      Cardiovascular status: blood pressure returned to baseline  Respiratory status: unassisted  Hydration status: euvolemic  Follow-up not needed.              Vitals Value Taken Time   /88 01/26/24 0946   Temp 36.7 °C (98.1 °F) 01/26/24 0922   Pulse 89 01/26/24 0946   Resp 16 01/26/24 0946   SpO2 97 % 01/26/24 0946   Vitals shown include unvalidated device data.      Event Time   Out of Recovery 09:40:00         Pain/Alessio Score: Alessio Score: 10 (1/26/2024  9:40 AM)

## 2024-01-26 NOTE — TRANSFER OF CARE
"Anesthesia Transfer of Care Note    Patient: David Nicole Jr.    Procedure(s) Performed: Procedure(s) (LRB):  COLONOSCOPY (N/A)    Patient location: PACU    Anesthesia Type: general    Transport from OR: Transported from OR on room air with adequate spontaneous ventilation    Post pain: adequate analgesia    Post assessment: no apparent anesthetic complications    Post vital signs: stable    Level of consciousness: awake    Nausea/Vomiting: no nausea/vomiting    Complications: none    Transfer of care protocol was followed      Last vitals: Visit Vitals  BP (!) 157/83 (BP Location: Left arm, Patient Position: Lying)   Pulse 92   Temp 36.6 °C (97.9 °F) (Temporal)   Resp 16   Ht 5' 7" (1.702 m)   Wt 87.1 kg (192 lb)   SpO2 97%   BMI 30.07 kg/m²     "

## 2024-01-26 NOTE — PROVATION PATIENT INSTRUCTIONS
Discharge Summary/Instructions after an Endoscopic Procedure  Patient Name: David Nicole  Patient MRN: [id number]  Patient YOB: 1960 Friday, January 26, 2024  Himanshu Contreras MD  Dear patient,  As a result of recent federal legislation (The Federal Cures Act), you may   receive lab or pathology results from your procedure in your [facility]   account before your physician is able to contact you. Your physician or   their representative will relay the results to you with their   recommendations at their soonest availability.  Thank you,  RESTRICTIONS:  During your procedure today, you received medications for sedation.  These   medications may affect your judgment, balance and coordination.  Therefore,   for 24 hours, you have the following restrictions:   - DO NOT drive a car, operate machinery, make legal/financial decisions,   sign important papers or drink alcohol.    ACTIVITY:  Today: no heavy lifting, straining or running due to procedural   sedation/anesthesia.  The following day: return to full activity including work.  DIET:  Eat and drink normally unless instructed otherwise.     TREATMENT FOR COMMON SIDE EFFECTS:  - Mild abdominal pain, nausea, belching, bloating or excessive gas:  rest,   eat lightly and use a heating pad.  - Sore Throat: treat with throat lozenges and/or gargle with warm salt   water.  - Because air was used during the procedure, expelling large amounts of air   from your rectum or belching is normal.  - If a bowel prep was taken, you may not have a bowel movement for 1-3 days.    This is normal.  SYMPTOMS TO WATCH FOR AND REPORT TO YOUR PHYSICIAN:  1. Abdominal pain or bloating, other than gas cramps.  2. Chest pain.  3. Back pain.  4. Signs of infection such as: chills or fever occurring within 24 hours   after the procedure.  5. Rectal bleeding, which would show as bright red, maroon, or black stools.   (A tablespoon of blood from the rectum is not serious, especially  if   hemorrhoids are present.)  6. Vomiting.  7. Weakness or dizziness.  GO DIRECTLY TO THE NEAREST EMERGENCY ROOM IF YOU HAVE ANY OF THE FOLLOWING:      Difficulty breathing              Chills and/or fever over 101 F   Persistent vomiting and/or vomiting blood   Severe abdominal pain   Severe chest pain   Black, tarry stools   Bleeding- more than one tablespoon   Any other symptom or condition that you feel may need urgent attention  Your doctor recommends these additional instructions:  If any biopsies were taken, your doctors clinic will contact you in 1 to 2   weeks with any results.  - Patient has a contact number available for emergencies.  The signs and   symptoms of potential delayed complications were discussed with the   patient.  Return to normal activities tomorrow.  Written discharge   instructions were provided to the patient.   - Discharge patient to home.   - Resume previous diet.   - Repeat colonoscopy because the bowel preparation was poor.   - Continue present medications.  For questions, problems or results please call your physician - Himanshu Contreras MD at Work:  [phone].  [facility] [place], EMERGENCY ROOM PHONE NUMBER: [phone]  IF A COMPLICATION OR EMERGENCY SITUATION ARISES AND YOU ARE UNABLE TO REACH   YOUR PHYSICIAN - GO DIRECTLY TO THE EMERGENCY ROOM.  Himanshu Contreras MD  1/26/2024 9:20:02 AM  This report has been verified and signed electronically.  Dear patient,  As a result of recent federal legislation (The Federal Cures Act), you may   receive lab or pathology results from your procedure in your [facility]   account before your physician is able to contact you. Your physician or   their representative will relay the results to you with their   recommendations at their soonest availability.  Thank you,  PROVATION

## 2024-01-26 NOTE — PLAN OF CARE
Discharge instructions given to patient/   and both verbalized understanding of all.  Written report of procedure provided by MD and given to patient. MD came to bedside and gave report of findings. VSS, denies n/v and tolerating PO, rates pain level tolerable. IV removed, and family notified for patient discharge home.

## 2024-02-14 ENCOUNTER — OFFICE VISIT (OUTPATIENT)
Dept: INTERNAL MEDICINE | Facility: CLINIC | Age: 64
End: 2024-02-14
Payer: COMMERCIAL

## 2024-02-14 VITALS
HEIGHT: 67 IN | WEIGHT: 199.75 LBS | OXYGEN SATURATION: 98 % | DIASTOLIC BLOOD PRESSURE: 72 MMHG | SYSTOLIC BLOOD PRESSURE: 112 MMHG | HEART RATE: 99 BPM | BODY MASS INDEX: 31.35 KG/M2

## 2024-02-14 DIAGNOSIS — R05.8 POST-VIRAL COUGH SYNDROME: Primary | ICD-10-CM

## 2024-02-14 DIAGNOSIS — Z86.16 HISTORY OF COVID-19: ICD-10-CM

## 2024-02-14 DIAGNOSIS — J32.9 CHRONIC CONGESTION OF PARANASAL SINUS: ICD-10-CM

## 2024-02-14 DIAGNOSIS — R05.3 PERSISTENT COUGH: ICD-10-CM

## 2024-02-14 DIAGNOSIS — G47.33 OSA (OBSTRUCTIVE SLEEP APNEA): ICD-10-CM

## 2024-02-14 DIAGNOSIS — H61.22 IMPACTED CERUMEN OF LEFT EAR: ICD-10-CM

## 2024-02-14 DIAGNOSIS — R13.12 DYSPHAGIA, OROPHARYNGEAL PHASE: ICD-10-CM

## 2024-02-14 PROCEDURE — 3008F BODY MASS INDEX DOCD: CPT | Mod: CPTII,S$GLB,, | Performed by: INTERNAL MEDICINE

## 2024-02-14 PROCEDURE — 3074F SYST BP LT 130 MM HG: CPT | Mod: CPTII,S$GLB,, | Performed by: INTERNAL MEDICINE

## 2024-02-14 PROCEDURE — 99213 OFFICE O/P EST LOW 20 MIN: CPT | Mod: S$GLB,,, | Performed by: INTERNAL MEDICINE

## 2024-02-14 PROCEDURE — 3078F DIAST BP <80 MM HG: CPT | Mod: CPTII,S$GLB,, | Performed by: INTERNAL MEDICINE

## 2024-02-14 PROCEDURE — 99999 PR PBB SHADOW E&M-EST. PATIENT-LVL V: CPT | Mod: PBBFAC,,, | Performed by: INTERNAL MEDICINE

## 2024-02-14 PROCEDURE — 1159F MED LIST DOCD IN RCRD: CPT | Mod: CPTII,S$GLB,, | Performed by: INTERNAL MEDICINE

## 2024-02-14 RX ORDER — PROMETHAZINE HYDROCHLORIDE AND DEXTROMETHORPHAN HYDROBROMIDE 6.25; 15 MG/5ML; MG/5ML
5 SYRUP ORAL EVERY 8 HOURS PRN
Qty: 240 ML | Refills: 0 | Status: SHIPPED | OUTPATIENT
Start: 2024-02-14 | End: 2024-03-01

## 2024-02-14 RX ORDER — AZITHROMYCIN 250 MG/1
TABLET, FILM COATED ORAL
Qty: 6 TABLET | Refills: 0 | Status: SHIPPED | OUTPATIENT
Start: 2024-02-14 | End: 2024-02-19

## 2024-02-14 RX ORDER — METHYLPREDNISOLONE 4 MG/1
TABLET ORAL
Qty: 21 EACH | Refills: 0 | Status: SHIPPED | OUTPATIENT
Start: 2024-02-14 | End: 2024-03-06

## 2024-02-14 NOTE — PATIENT INSTRUCTIONS
Schedule ENT appointment for persistent hearing loss and recurrent sinus issues and oropharyngeal dysphagia.    Start medrol dose pack, follow directions on packaging  Start azithromycin (z-pack), follow directions on packaging.   OK to continue taking mucinex to loosen up the mucous  OK to continue using flonase for topical nasal steroid to decrease inflammation.     Take promethazine DM cough syrup as needed (usually at night) to help get better rest.     Consider using nasal rinse / normal saline rinse that can be purchased over the counter.     Schedule a follow up with your PCP.

## 2024-02-14 NOTE — PROGRESS NOTES
Subjective:       Patient ID: David Nicole Jr. is a 63 y.o. male.    Chief Complaint: Cough and Sinus Problem      HPI  David Nicole Jr. is a 63 y.o. year old male established patient of Dr. Parker presents for urgent care visit.     Post nasal symptoms since covid-19; cough persistent, productive of scant yellow/green sputum. Chronic sinus congestion. Mild headaches. No fever, chills.     Review of Systems   Constitutional:  Negative for activity change, appetite change, chills, fatigue, fever and unexpected weight change.   HENT:  Positive for congestion and postnasal drip. Negative for rhinorrhea and sore throat.    Eyes:  Negative for visual disturbance.   Respiratory:  Positive for cough. Negative for shortness of breath.    Cardiovascular:  Negative for chest pain.   Gastrointestinal:  Negative for abdominal pain, diarrhea, nausea and vomiting.   Genitourinary:  Negative for difficulty urinating and dysuria.   Musculoskeletal:  Negative for arthralgias, back pain and myalgias.   Skin:  Negative for color change and rash.   Neurological:  Negative for dizziness, weakness and headaches.         Past Medical History:   Diagnosis Date    Anxiety     Arthritis     Baker's cyst     Diabetes mellitus, type 2     H/O peptic ulcer 02/27/2018    H/O type 2 diabetes mellitus 02/27/2018    Hyperlipidemia     Hypertension     Insomnia     Intractable migraine with aura with status migrainosus 08/17/2016    GONB (Greater Occipital Nerve Block):2016    Joint pain     Knee pain     HILARIA (obstructive sleep apnea)     Prediabetes         Prior to Admission medications    Medication Sig Start Date End Date Taking? Authorizing Provider   albuterol (VENTOLIN HFA) 90 mcg/actuation inhaler Inhale 2 puffs into the lungs every 6 (six) hours as needed for Wheezing. Rescue 12/27/23 12/26/24 Yes Alfred Jain DNP   ascorbic acid, vitamin C, (VITAMIN C) 100 MG tablet Take 100 mg by mouth once daily.   Yes Provider, Historical  "  atorvastatin (LIPITOR) 80 MG tablet Take 1 tablet (80 mg total) by mouth every evening. 7/6/23  Yes Estelle Parker MD   buPROPion (WELLBUTRIN XL) 300 MG 24 hr tablet Take 1 tablet (300 mg total) by mouth once daily. 8/7/23 8/6/24 Yes Estelle Parker MD   CYANOCOBALAMIN, VITAMIN B-12, (VITAMIN B-12 ORAL) Take by mouth.   Yes Provider, Historical   ferrous sulfate 325 (65 FE) MG EC tablet Take 325 mg by mouth once daily.   Yes Provider, Historical   FLUoxetine 40 MG capsule Take 2 capsules (80 mg total) by mouth every morning. 7/6/23  Yes Estelle Parker MD   fluticasone propionate (FLONASE) 50 mcg/actuation nasal spray 2 sprays (100 mcg total) by Each Nostril route once daily. 9/27/23  Yes Estelle Parker MD   losartan (COZAAR) 100 MG tablet Take 1 tablet (100 mg total) by mouth once daily. 7/6/23 7/5/24 Yes Estelle Parker MD   multivitamin (THERAGRAN) per tablet Take 1 tablet by mouth once daily.   Yes Provider, Historical   omeprazole (PRILOSEC) 20 MG capsule TAKE 1 CAPSULE BY MOUTH  EVERY MORNING TO CONTROL  GERD 7/6/23  Yes Estelle Parker MD   ondansetron (ZOFRAN-ODT) 8 MG TbDL Take 1 tablet (8 mg total) by mouth every 24 hours as needed (nausea). 5/18/22  Yes Luis Flynn MD   semaglutide (OZEMPIC) 1 mg/dose (4 mg/3 mL) Inject 1 mg into the skin every 7 days. 7/6/23  Yes Estelle Parker MD   verapamiL (VERELAN) 240 MG C24P Take 1 capsule (240 mg total) by mouth once daily. 7/6/23  Yes Estelle Parker MD   vitamin D (VITAMIN D3) 1000 units Tab Take 1,000 Units by mouth once daily.   Yes Provider, Historical        Past medical history, surgical history, and family medical history reviewed and updated as appropriate.    Medications and allergies reviewed.     Objective:          Vitals:    02/14/24 1600   BP: 112/72   BP Location: Right arm   Patient Position: Sitting   Pulse: 99   SpO2: 98%   Weight: 90.6 kg (199 lb 11.8 oz)   Height: 5' 7" (1.702 m)     Body mass index is 31.28 " kg/m².  Physical Exam  Constitutional:       General: He is not in acute distress.     Appearance: He is well-developed.   HENT:      Head: Normocephalic and atraumatic.      Nose: Nose normal.   Eyes:      General: No scleral icterus.  Neck:      Thyroid: No thyromegaly.      Vascular: No JVD.      Trachea: No tracheal deviation.   Cardiovascular:      Rate and Rhythm: Normal rate and regular rhythm.      Heart sounds: Normal heart sounds. No murmur heard.     No friction rub. No gallop.   Pulmonary:      Effort: Pulmonary effort is normal. No respiratory distress.      Breath sounds: Normal breath sounds. No wheezing or rales.   Abdominal:      General: Bowel sounds are normal. There is no distension.      Palpations: Abdomen is soft. There is no mass.      Tenderness: There is no abdominal tenderness.   Musculoskeletal:         General: No tenderness. Normal range of motion.      Cervical back: Normal range of motion and neck supple.   Lymphadenopathy:      Cervical: No cervical adenopathy.   Skin:     General: Skin is warm and dry.      Findings: No rash.   Neurological:      Mental Status: He is alert and oriented to person, place, and time.      Cranial Nerves: No cranial nerve deficit.      Deep Tendon Reflexes: Reflexes normal.   Psychiatric:         Behavior: Behavior normal.         Lab Results   Component Value Date    WBC 9.47 07/07/2023    HGB 15.0 07/07/2023    HCT 46.6 07/07/2023     07/07/2023    CHOL 106 (L) 07/07/2023    TRIG 53 07/07/2023    HDL 36 (L) 07/07/2023    ALT 18 07/07/2023    AST 21 07/07/2023     07/07/2023    K 4.3 07/07/2023     07/07/2023    CREATININE 0.8 07/07/2023    BUN 14 07/07/2023    CO2 26 07/07/2023    TSH 1.370 07/07/2023    PSA 0.85 07/07/2023    HGBA1C 5.3 07/07/2023       Assessment:       1. Post-viral cough syndrome    2. History of COVID-19    3. Chronic congestion of paranasal sinus    4. Persistent cough    5. HILARIA (obstructive sleep apnea)    6.  Impacted cerumen of left ear    7. Dysphagia, oropharyngeal phase          Plan:     David was seen today for cough and sinus problem.    Diagnoses and all orders for this visit:    Post-viral cough syndrome  Comments:  associated with covid-19; had covid 12/27/204. Residual persistent mucous (greenish yellow, small amount). No fever, chills, chest pain, shortness of breath.    History of COVID-19  Comments:  took paxlovid for 5 days    Chronic congestion of paranasal sinus  -     methylPREDNISolone (MEDROL DOSEPACK) 4 mg tablet; use as directed  -     azithromycin (Z-AMBERLY) 250 MG tablet; Take 2 tablets by mouth on day 1; Take 1 tablet by mouth on days 2-5    Persistent cough  -     promethazine-dextromethorphan (PROMETHAZINE-DM) 6.25-15 mg/5 mL Syrp; Take 5 mLs by mouth every 8 (eight) hours as needed (cough).    HILARIA (obstructive sleep apnea)  Comments:  unable to tolerate cpap    Impacted cerumen of left ear  Comments:  lavage today    Dysphagia, oropharyngeal phase  -     Ambulatory referral/consult to ENT; Future        Health maintenance reviewed with patient.     Follow up if symptoms worsen or fail to improve.    Farhat Acosta MD  Internal Medicine / Primary Care  [facility]  2/14/2024

## 2024-02-16 ENCOUNTER — ANESTHESIA EVENT (OUTPATIENT)
Dept: ENDOSCOPY | Facility: HOSPITAL | Age: 64
End: 2024-02-16
Payer: COMMERCIAL

## 2024-02-16 NOTE — ANESTHESIA PREPROCEDURE EVALUATION
02/16/2024  David Nicole Jr. is a 63 y.o., male.  [facility]  Anesthesia Pre-Operative Evaluation       Patient Name: David Nicole Jr.  YOB: 1960  MRN: [id number]  [facility]: [id number]      Code Status: No Order   Date of Procedure: 2/26/2024  Anesthesia: Choice Procedure: Procedure(s) (LRB):  COLONOSCOPY (N/A)  Pre-Operative Diagnosis: History of colon polyps [Z86.010]  Encounter for colorectal cancer screening [Z12.11, Z12.12]  Proceduralist: Surgeon(s) and Role:     * Himanshu Contreras MD - Primary Nurse: (Unknown)      SUBJECTIVE:   David Nicole Jr. is a 63 y.o. male who  has a past medical history of Anxiety, Arthritis, Baker's cyst, Diabetes mellitus, type 2, H/O peptic ulcer (02/27/2018), H/O type 2 diabetes mellitus (02/27/2018), Hyperlipidemia, Hypertension, Insomnia, Intractable migraine with aura with status migrainosus (08/17/2016), Joint pain, Knee pain, HLIARIA (obstructive sleep apnea), and Prediabetes..     he has a current medication list which includes the following long-term medication(s): albuterol, atorvastatin, bupropion, fluoxetine, losartan, omeprazole, and verapamil.     ALLERGIES:   Review of patient's allergies indicates:  No Known Allergies  LDA:          Lines/Drains/Airways       None                  Anesthesia Evaluation      Airway   Dental      Pulmonary    (+) sleep apnea  Cardiovascular   (+) hypertension    Neuro/Psych    (+) neuromuscular disease, headaches    GI/Hepatic/Renal    (+) hiatal hernia, GERD    Endo/Other    (+) diabetes mellitus type 2  Abdominal                     MEDICATIONS:     Antibiotics (From admission, onward)      None          VTE Risk Mitigation (From admission, onward)      None              No current facility-administered medications for this encounter.     Current Outpatient Medications   Medication Sig  Dispense Refill    albuterol (VENTOLIN HFA) 90 mcg/actuation inhaler Inhale 2 puffs into the lungs every 6 (six) hours as needed for Wheezing. Rescue 18 g 0    ascorbic acid, vitamin C, (VITAMIN C) 100 MG tablet Take 100 mg by mouth once daily.      atorvastatin (LIPITOR) 80 MG tablet Take 1 tablet (80 mg total) by mouth every evening. 90 tablet 3    azithromycin (Z-AMBERLY) 250 MG tablet Take 2 tablets by mouth on day 1; Take 1 tablet by mouth on days 2-5 6 tablet 0    buPROPion (WELLBUTRIN XL) 300 MG 24 hr tablet Take 1 tablet (300 mg total) by mouth once daily. 90 tablet 3    CYANOCOBALAMIN, VITAMIN B-12, (VITAMIN B-12 ORAL) Take by mouth.      ferrous sulfate 325 (65 FE) MG EC tablet Take 325 mg by mouth once daily.      FLUoxetine 40 MG capsule Take 2 capsules (80 mg total) by mouth every morning. 180 capsule 3    fluticasone propionate (FLONASE) 50 mcg/actuation nasal spray 2 sprays (100 mcg total) by Each Nostril route once daily. 48 g 3    losartan (COZAAR) 100 MG tablet Take 1 tablet (100 mg total) by mouth once daily. 90 tablet 3    methylPREDNISolone (MEDROL DOSEPACK) 4 mg tablet use as directed 21 each 0    multivitamin (THERAGRAN) per tablet Take 1 tablet by mouth once daily.      omeprazole (PRILOSEC) 20 MG capsule TAKE 1 CAPSULE BY MOUTH  EVERY MORNING TO CONTROL  GERD 90 capsule 3    ondansetron (ZOFRAN-ODT) 8 MG TbDL Take 1 tablet (8 mg total) by mouth every 24 hours as needed (nausea). 30 tablet 1    promethazine-dextromethorphan (PROMETHAZINE-DM) 6.25-15 mg/5 mL Syrp Take 5 mLs by mouth every 8 (eight) hours as needed (cough). 240 mL 0    semaglutide (OZEMPIC) 1 mg/dose (4 mg/3 mL) Inject 1 mg into the skin every 7 days. 12 mL 3    verapamiL (VERELAN) 240 MG C24P Take 1 capsule (240 mg total) by mouth once daily. 90 capsule 3    vitamin D (VITAMIN D3) 1000 units Tab Take 1,000 Units by mouth once daily.            History:   There are no hospital problems to display for this patient.    Surgical  History:    has a past surgical history that includes Back surgery; Tonsillectomy; [place] tooth extraction; Vasectomy; Colonoscopy (N/A, 2/13/2017); Spine surgery; and Colonoscopy (N/A, 1/26/2024).   Social History:    reports being sexually active.  reports that he has never smoked. He has never been exposed to tobacco smoke. He has never used smokeless tobacco. He reports current alcohol use. He reports that he does not use drugs.     OBJECTIVE:     Vital Signs (Most Recent):    Vital Signs Range (Last 24H):  BP: ()/()   Arterial Line BP: ()/()        There is no height or weight on file to calculate BMI.   Wt Readings from Last 4 Encounters:   02/14/24 90.6 kg (199 lb 11.8 oz)   01/26/24 87.1 kg (192 lb)   12/27/23 88.9 kg (196 lb)   12/18/23 89.3 kg (196 lb 13.9 oz)       Significant Labs:  Lab Results   Component Value Date    WBC 9.47 07/07/2023    HGB 15.0 07/07/2023    HCT 46.6 07/07/2023     07/07/2023     07/07/2023    K 4.3 07/07/2023     07/07/2023    CREATININE 0.8 07/07/2023    BUN 14 07/07/2023    CO2 26 07/07/2023    GLU 80 07/07/2023    CALCIUM 9.2 07/07/2023    MG 1.6 03/29/2021    PHOS 3.4 03/29/2021    ALKPHOS 100 07/07/2023    ALT 18 07/07/2023    AST 21 07/07/2023    ALBUMIN 3.8 07/07/2023    HGBA1C 5.3 07/07/2023     03/29/2021     No LMP for male patient.  No results found for this or any previous visit (from the past 72 hour(s)).    EKG:   Results for orders placed or performed in visit on 09/01/20   EKG 12-lead    Collection Time: 09/01/20  9:14 AM    Narrative    Test Reason : I10,    Vent. Rate : 098 BPM     Atrial Rate : 098 BPM     P-R Int : 142 ms          QRS Dur : 078 ms      QT Int : 358 ms       P-R-T Axes : 034 055 044 degrees     QTc Int : 457 ms    Normal sinus rhythm  Normal ECG  No previous ECGs available  Confirmed by Cassie GASPAR, Love ([id number]) on 9/1/2020 8:02:59 PM    Referred By: SEDA FELIPE           Confirmed By:Love Matthews MD       TTE:  No  "results found for this or any previous visit.  No results found for: "EF"   No results found for this or any previous visit.  ZULEYKA:  No results found for this or any previous visit.  Stress Test:  No results found for this or any previous visit.     LHC:  No results found for this or any previous visit.     PFT:  No results found for: "FEV1", "FVC", "CET9XXT", "TLC", "DLCO"     ASSESSMENT/PLAN:        Pre-op Assessment    I have reviewed the Patient Summary Reports.     I have reviewed the Nursing Notes. I have reviewed the NPO Status.   I have reviewed the Medications.     Review of Systems  Anesthesia Hx:  No problems with previous Anesthesia             Denies Family Hx of Anesthesia complications.    Denies Personal Hx of Anesthesia complications.                    Hematology/Oncology:    Oncology Normal    -- Anemia:                                  EENT/Dental:  EENT/Dental Normal           Cardiovascular:     Hypertension           hyperlipidemia                             Pulmonary:        Sleep Apnea                Renal/:  Renal/ Normal                 Hepatic/GI:    Hiatal Hernia, GERD             Musculoskeletal:  Musculoskeletal Normal                Neurological:    Neuromuscular Disease,  Headaches                                 Endocrine:  Diabetes, type 2         Morbid Obesity / BMI > 40  Dermatological:  Skin Normal    Psych:    depression                   Anesthesia Plan  Type of Anesthesia, risks & benefits discussed:    Anesthesia Type: Gen Natural Airway  Intra-op Monitoring Plan: Standard ASA Monitors  Post Op Pain Control Plan: multimodal analgesia  Induction:  IV  Informed Consent: Informed consent signed with the Patient and all parties understand the risks and agree with anesthesia plan.  All questions answered.   ASA Score: 3  Day of Surgery Review of History & Physical: H&P Update referred to the surgeon/provider.    Ready For Surgery From Anesthesia Perspective.     .      "

## 2024-02-19 ENCOUNTER — PATIENT MESSAGE (OUTPATIENT)
Dept: ADMINISTRATIVE | Facility: HOSPITAL | Age: 64
End: 2024-02-19
Payer: COMMERCIAL

## 2024-02-26 ENCOUNTER — ANESTHESIA (OUTPATIENT)
Dept: ENDOSCOPY | Facility: HOSPITAL | Age: 64
End: 2024-02-26
Payer: COMMERCIAL

## 2024-02-26 ENCOUNTER — HOSPITAL ENCOUNTER (OUTPATIENT)
Facility: HOSPITAL | Age: 64
Discharge: HOME | End: 2024-02-26
Attending: INTERNAL MEDICINE | Admitting: INTERNAL MEDICINE
Payer: COMMERCIAL

## 2024-02-26 VITALS
RESPIRATION RATE: 17 BRPM | WEIGHT: 197 LBS | HEART RATE: 92 BPM | BODY MASS INDEX: 30.92 KG/M2 | DIASTOLIC BLOOD PRESSURE: 79 MMHG | TEMPERATURE: 98 F | SYSTOLIC BLOOD PRESSURE: 140 MMHG | HEIGHT: 67 IN | OXYGEN SATURATION: 99 %

## 2024-02-26 DIAGNOSIS — Z12.11 COLON CANCER SCREENING: Primary | ICD-10-CM

## 2024-02-26 DIAGNOSIS — Z12.11 SCREEN FOR COLON CANCER: ICD-10-CM

## 2024-02-26 LAB
GLUCOSE SERPL-MCNC: 89 MG/DL (ref 70–110)
POCT GLUCOSE: 89 MG/DL (ref 70–110)
POCT GLUCOSE: 90 MG/DL (ref 70–110)

## 2024-02-26 PROCEDURE — 99900035 HC TECH TIME PER 15 MIN (STAT)

## 2024-02-26 PROCEDURE — D9220A PRA ANESTHESIA: Mod: ,,, | Performed by: NURSE ANESTHETIST, CERTIFIED REGISTERED

## 2024-02-26 PROCEDURE — 37000009 HC ANESTHESIA EA ADD 15 MINS: Performed by: INTERNAL MEDICINE

## 2024-02-26 PROCEDURE — G0121 COLON CA SCRN NOT HI RSK IND: HCPCS | Mod: ,,, | Performed by: INTERNAL MEDICINE

## 2024-02-26 PROCEDURE — 94761 N-INVAS EAR/PLS OXIMETRY MLT: CPT

## 2024-02-26 PROCEDURE — 82962 GLUCOSE BLOOD TEST: CPT | Performed by: INTERNAL MEDICINE

## 2024-02-26 PROCEDURE — 63600175 PHARM REV CODE 636 W HCPCS: Performed by: NURSE ANESTHETIST, CERTIFIED REGISTERED

## 2024-02-26 PROCEDURE — 25000003 PHARM REV CODE 250: Performed by: NURSE ANESTHETIST, CERTIFIED REGISTERED

## 2024-02-26 PROCEDURE — 37000008 HC ANESTHESIA 1ST 15 MINUTES: Performed by: INTERNAL MEDICINE

## 2024-02-26 PROCEDURE — G0121 COLON CA SCRN NOT HI RSK IND: HCPCS | Performed by: INTERNAL MEDICINE

## 2024-02-26 RX ORDER — PROPOFOL 10 MG/ML
VIAL (ML) INTRAVENOUS
Status: DISCONTINUED | OUTPATIENT
Start: 2024-02-26 | End: 2024-02-26

## 2024-02-26 RX ORDER — SODIUM CHLORIDE 9 MG/ML
INJECTION, SOLUTION INTRAVENOUS CONTINUOUS
Status: DISCONTINUED | OUTPATIENT
Start: 2024-02-26 | End: 2024-02-26 | Stop reason: HOSPADM

## 2024-02-26 RX ORDER — LIDOCAINE HYDROCHLORIDE 20 MG/ML
INJECTION INTRAVENOUS
Status: DISCONTINUED | OUTPATIENT
Start: 2024-02-26 | End: 2024-02-26

## 2024-02-26 RX ADMIN — LIDOCAINE HYDROCHLORIDE 40 MG: 20 INJECTION INTRAVENOUS at 07:02

## 2024-02-26 RX ADMIN — GLYCOPYRROLATE 0.1 MG: 0.2 INJECTION, SOLUTION INTRAMUSCULAR; INTRAVENOUS at 07:02

## 2024-02-26 RX ADMIN — SODIUM CHLORIDE: 0.9 INJECTION, SOLUTION INTRAVENOUS at 07:02

## 2024-02-26 RX ADMIN — PROPOFOL 60 MG: 10 INJECTION, EMULSION INTRAVENOUS at 07:02

## 2024-02-26 NOTE — TRANSFER OF CARE
"Anesthesia Transfer of Care Note    Patient: David Nicole Jr.    Procedure(s) Performed: Procedure(s) (LRB):  COLONOSCOPY (N/A)    Patient location: PACU    Anesthesia Type: general    Transport from OR: Transported from OR on room air with adequate spontaneous ventilation    Post pain: adequate analgesia    Post assessment: no apparent anesthetic complications and tolerated procedure well    Post vital signs: stable    Level of consciousness: awake    Nausea/Vomiting: no nausea/vomiting    Complications: none    Transfer of care protocol was followed      Last vitals: Visit Vitals  BP (!) 152/83 (BP Location: Left arm, Patient Position: Lying)   Pulse 96   Temp 37.2 °C (99 °F) (Temporal)   Resp 16   Ht 5' 7" (1.702 m)   Wt 89.4 kg (197 lb)   SpO2 97%   BMI 30.85 kg/m²     "

## 2024-02-26 NOTE — H&P
Short Stay Endoscopy History and Physical    PCP - Estelle Parker MD    Procedure - Colonoscopy  Sedation: GA  ASA - per anesthesia  Mallampati - per anesthesia  History of Anesthesia problems - no  Family history Anesthesia problems -  no     HPI:  This is a 63 y.o. male here for evaluation of : Screening for CRC    Reflux - no  Dysphagia - no  Abdominal pain - no  Diarrhea - no    ROS:  Constitutional: No fevers, chills, No weight loss  ENT: No allergies  CV: No chest pain  Pulm: No cough, No shortness of breath  Ophtho: No vision changes  GI: see HPI  Medical History:  has a past medical history of Anxiety, Arthritis, Baker's cyst, Diabetes mellitus, type 2, H/O peptic ulcer (02/27/2018), H/O type 2 diabetes mellitus (02/27/2018), Hyperlipidemia, Hypertension, Insomnia, Intractable migraine with aura with status migrainosus (08/17/2016), Joint pain, Knee pain, HILARIA (obstructive sleep apnea), and Prediabetes.    Surgical History:  has a past surgical history that includes Back surgery; Tonsillectomy; [place] tooth extraction; Vasectomy; Colonoscopy (N/A, 2/13/2017); Spine surgery; and Colonoscopy (N/A, 1/26/2024).    Family History: family history includes Headaches in his mother; Heart disease in his father.. Otherwise no colon cancer, inflammatory bowel disease, or GI malignancies.    Social History:  reports that he has never smoked. He has never been exposed to tobacco smoke. He has never used smokeless tobacco. He reports current alcohol use. He reports that he does not use drugs.    Review of patient's allergies indicates:  No Known Allergies    Medications:   Medications Prior to Admission   Medication Sig Dispense Refill Last Dose    albuterol (VENTOLIN HFA) 90 mcg/actuation inhaler Inhale 2 puffs into the lungs every 6 (six) hours as needed for Wheezing. Rescue 18 g 0 Past Month    ascorbic acid, vitamin C, (VITAMIN C) 100 MG tablet Take 100 mg by mouth once daily.   Past Week    atorvastatin (LIPITOR)  80 MG tablet Take 1 tablet (80 mg total) by mouth every evening. 90 tablet 3 2/25/2024    buPROPion (WELLBUTRIN XL) 300 MG 24 hr tablet Take 1 tablet (300 mg total) by mouth once daily. 90 tablet 3 2/26/2024    CYANOCOBALAMIN, VITAMIN B-12, (VITAMIN B-12 ORAL) Take by mouth.   Past Month    FLUoxetine 40 MG capsule Take 2 capsules (80 mg total) by mouth every morning. 180 capsule 3 2/26/2024    fluticasone propionate (FLONASE) 50 mcg/actuation nasal spray 2 sprays (100 mcg total) by Each Nostril route once daily. 48 g 3 2/25/2024    losartan (COZAAR) 100 MG tablet Take 1 tablet (100 mg total) by mouth once daily. 90 tablet 3 2/25/2024    multivitamin (THERAGRAN) per tablet Take 1 tablet by mouth once daily.   Past Month    omeprazole (PRILOSEC) 20 MG capsule TAKE 1 CAPSULE BY MOUTH  EVERY MORNING TO CONTROL  GERD 90 capsule 3 2/26/2024    ondansetron (ZOFRAN-ODT) 8 MG TbDL Take 1 tablet (8 mg total) by mouth every 24 hours as needed (nausea). 30 tablet 1 2/25/2024    promethazine-dextromethorphan (PROMETHAZINE-DM) 6.25-15 mg/5 mL Syrp Take 5 mLs by mouth every 8 (eight) hours as needed (cough). 240 mL 0 Past Month    verapamiL (VERELAN) 240 MG C24P Take 1 capsule (240 mg total) by mouth once daily. 90 capsule 3 2/26/2024    vitamin D (VITAMIN D3) 1000 units Tab Take 1,000 Units by mouth once daily.   Past Month    ferrous sulfate 325 (65 FE) MG EC tablet Take 325 mg by mouth once daily.   More than a month    methylPREDNISolone (MEDROL DOSEPACK) 4 mg tablet use as directed 21 each 0 Unknown    semaglutide (OZEMPIC) 1 mg/dose (4 mg/3 mL) Inject 1 mg into the skin every 7 days. 12 mL 3 2/13/2024       Objective Findings:    Vital Signs: Per nursing notes.    Physical Exam:  General Appearance: Well appearing in no acute distress  Head:   Normocephalic, without obvious abnormality  Eyes:    No scleral icterus  Airway: Open  Neck: No restriction in mobility  Lungs: CTA bilaterally in anterior and posterior fields, no  wheezes, no crackles.  Heart:  Regular rate and rhythm, S1, S2 normal, no murmurs heard  Abdomen: Soft, non tender, non distended      Labs:  Lab Results   Component Value Date    WBC 9.47 07/07/2023    HGB 15.0 07/07/2023    HCT 46.6 07/07/2023     07/07/2023    CHOL 106 (L) 07/07/2023    TRIG 53 07/07/2023    HDL 36 (L) 07/07/2023    ALT 18 07/07/2023    AST 21 07/07/2023     07/07/2023    K 4.3 07/07/2023     07/07/2023    CREATININE 0.8 07/07/2023    BUN 14 07/07/2023    CO2 26 07/07/2023    TSH 1.370 07/07/2023    PSA 0.85 07/07/2023    HGBA1C 5.3 07/07/2023         I have explained the risks and benefits of endoscopy procedures to the patient including but not limited to bleeding, perforation, infection, and death.    Thank you so much for allowing me to participate in the care of David Contreras MD

## 2024-02-26 NOTE — ANESTHESIA POSTPROCEDURE EVALUATION
Anesthesia Post Evaluation    Patient: David Nicole JrJeffery    Procedure(s) Performed: Procedure(s) (LRB):  COLONOSCOPY (N/A)    Final Anesthesia Type: general      Patient location during evaluation: GI PACU  Patient participation: Yes- Able to Participate  Level of consciousness: awake and alert  Post-procedure vital signs: reviewed and stable  Pain management: adequate  Airway patency: patent    PONV status at discharge: No PONV  Anesthetic complications: no      Cardiovascular status: blood pressure returned to baseline and hemodynamically stable  Respiratory status: unassisted  Hydration status: euvolemic  Follow-up not needed.              Vitals Value Taken Time   /79 02/26/24 0831   Temp 36.5 °C (97.7 °F) 02/26/24 0820   Pulse 95 02/26/24 0840   Resp 18 02/26/24 0840   SpO2 99 % 02/26/24 0840   Vitals shown include unvalidated device data.      Event Time   Out of Recovery 08:34:00         Pain/Alessio Score: Alessio Score: 10 (2/26/2024  8:20 AM)

## 2024-02-26 NOTE — PROVATION PATIENT INSTRUCTIONS
Discharge Summary/Instructions after an Endoscopic Procedure  Patient Name: David Nicole  Patient MRN: [id number]  Patient YOB: 1960 Monday, February 26, 2024  Himanshu Contreras MD  Dear patient,  As a result of recent federal legislation (The Federal Cures Act), you may   receive lab or pathology results from your procedure in your [facility]   account before your physician is able to contact you. Your physician or   their representative will relay the results to you with their   recommendations at their soonest availability.  Thank you,  RESTRICTIONS:  During your procedure today, you received medications for sedation.  These   medications may affect your judgment, balance and coordination.  Therefore,   for 24 hours, you have the following restrictions:   - DO NOT drive a car, operate machinery, make legal/financial decisions,   sign important papers or drink alcohol.    ACTIVITY:  Today: no heavy lifting, straining or running due to procedural   sedation/anesthesia.  The following day: return to full activity including work.  DIET:  Eat and drink normally unless instructed otherwise.     TREATMENT FOR COMMON SIDE EFFECTS:  - Mild abdominal pain, nausea, belching, bloating or excessive gas:  rest,   eat lightly and use a heating pad.  - Sore Throat: treat with throat lozenges and/or gargle with warm salt   water.  - Because air was used during the procedure, expelling large amounts of air   from your rectum or belching is normal.  - If a bowel prep was taken, you may not have a bowel movement for 1-3 days.    This is normal.  SYMPTOMS TO WATCH FOR AND REPORT TO YOUR PHYSICIAN:  1. Abdominal pain or bloating, other than gas cramps.  2. Chest pain.  3. Back pain.  4. Signs of infection such as: chills or fever occurring within 24 hours   after the procedure.  5. Rectal bleeding, which would show as bright red, maroon, or black stools.   (A tablespoon of blood from the rectum is not serious, especially  if   hemorrhoids are present.)  6. Vomiting.  7. Weakness or dizziness.  GO DIRECTLY TO THE NEAREST EMERGENCY ROOM IF YOU HAVE ANY OF THE FOLLOWING:      Difficulty breathing              Chills and/or fever over 101 F   Persistent vomiting and/or vomiting blood   Severe abdominal pain   Severe chest pain   Black, tarry stools   Bleeding- more than one tablespoon   Any other symptom or condition that you feel may need urgent attention  Your doctor recommends these additional instructions:  If any biopsies were taken, your doctors clinic will contact you in 1 to 2   weeks with any results.  - Patient has a contact number available for emergencies.  The signs and   symptoms of potential delayed complications were discussed with the   patient.  Return to normal activities tomorrow.  Written discharge   instructions were provided to the patient.   - Discharge patient to home.   - Resume previous diet.   - Continue present medications.   - Repeat colonoscopy in 5 years for screening purposes.   For questions, problems or results please call your physician - Himanshu Contreras MD at Work:  [phone].  [facility], EMERGENCY ROOM PHONE NUMBER: [phone]  IF A COMPLICATION OR EMERGENCY SITUATION ARISES AND YOU ARE UNABLE TO REACH   YOUR PHYSICIAN - GO DIRECTLY TO THE EMERGENCY ROOM.  Himanshu Contreras MD  2/26/2024 8:19:41 AM  This report has been verified and signed electronically.  Dear patient,  As a result of recent federal legislation (The Federal Cures Act), you may   receive lab or pathology results from your procedure in your [facility]   account before your physician is able to contact you. Your physician or   their representative will relay the results to you with their   recommendations at their soonest availability.  Thank you,  PROVATION

## 2024-02-26 NOTE — PLAN OF CARE
Chart reviewed. Preop nursing care completed per orders. Safe surgery checklist complete. Pt denies any open wounds cuts or sores. Pt denies any metal in body. Belongings in locker 7. Waiting for H&P; surgical consent; anesthesia consent; and admission order prior to surgery. Pt AAOX4, VSS on room air. Pt toileted, Bed locked in lowest position, Call light within reach. Pt denies any needs at this time.

## 2024-02-28 ENCOUNTER — OFFICE VISIT (OUTPATIENT)
Dept: OTOLARYNGOLOGY | Facility: CLINIC | Age: 64
End: 2024-02-28
Payer: COMMERCIAL

## 2024-02-28 VITALS
HEIGHT: 67 IN | HEART RATE: 87 BPM | SYSTOLIC BLOOD PRESSURE: 136 MMHG | WEIGHT: 200.19 LBS | BODY MASS INDEX: 31.42 KG/M2 | DIASTOLIC BLOOD PRESSURE: 90 MMHG

## 2024-02-28 DIAGNOSIS — R13.12 DYSPHAGIA, OROPHARYNGEAL PHASE: Primary | ICD-10-CM

## 2024-02-28 PROCEDURE — 99214 OFFICE O/P EST MOD 30 MIN: CPT | Mod: 25,S$GLB,, | Performed by: OTOLARYNGOLOGY

## 2024-02-28 PROCEDURE — 3075F SYST BP GE 130 - 139MM HG: CPT | Mod: CPTII,S$GLB,, | Performed by: OTOLARYNGOLOGY

## 2024-02-28 PROCEDURE — 99999 PR PBB SHADOW E&M-EST. PATIENT-LVL IV: CPT | Mod: PBBFAC,,, | Performed by: OTOLARYNGOLOGY

## 2024-02-28 PROCEDURE — 1159F MED LIST DOCD IN RCRD: CPT | Mod: CPTII,S$GLB,, | Performed by: OTOLARYNGOLOGY

## 2024-02-28 PROCEDURE — 3008F BODY MASS INDEX DOCD: CPT | Mod: CPTII,S$GLB,, | Performed by: OTOLARYNGOLOGY

## 2024-02-28 PROCEDURE — 3080F DIAST BP >= 90 MM HG: CPT | Mod: CPTII,S$GLB,, | Performed by: OTOLARYNGOLOGY

## 2024-02-28 PROCEDURE — 31575 DIAGNOSTIC LARYNGOSCOPY: CPT | Mod: S$GLB,,, | Performed by: OTOLARYNGOLOGY

## 2024-03-10 ENCOUNTER — PATIENT MESSAGE (OUTPATIENT)
Dept: PRIMARY CARE CLINIC | Facility: CLINIC | Age: 64
End: 2024-03-10
Payer: COMMERCIAL

## 2024-03-20 ENCOUNTER — OFFICE VISIT (OUTPATIENT)
Dept: URGENT CARE | Facility: CLINIC | Age: 64
End: 2024-03-20
Payer: COMMERCIAL

## 2024-03-20 ENCOUNTER — TELEPHONE (OUTPATIENT)
Dept: SPEECH THERAPY | Facility: HOSPITAL | Age: 64
End: 2024-03-20
Payer: COMMERCIAL

## 2024-03-20 VITALS
SYSTOLIC BLOOD PRESSURE: 160 MMHG | OXYGEN SATURATION: 98 % | WEIGHT: 204.56 LBS | BODY MASS INDEX: 32.04 KG/M2 | DIASTOLIC BLOOD PRESSURE: 94 MMHG | RESPIRATION RATE: 20 BRPM | HEART RATE: 60 BPM | TEMPERATURE: 98 F

## 2024-03-20 DIAGNOSIS — J02.9 SORE THROAT: ICD-10-CM

## 2024-03-20 DIAGNOSIS — J30.2 SEASONAL ALLERGIES: Primary | ICD-10-CM

## 2024-03-20 LAB
CTP QC/QA: YES
CTP QC/QA: YES
MOLECULAR STREP A: NEGATIVE
SARS-COV-2 AG RESP QL IA.RAPID: NEGATIVE

## 2024-03-20 PROCEDURE — 87651 STREP A DNA AMP PROBE: CPT | Mod: QW,S$GLB,, | Performed by: FAMILY MEDICINE

## 2024-03-20 PROCEDURE — 87811 SARS-COV-2 COVID19 W/OPTIC: CPT | Mod: QW,S$GLB,, | Performed by: FAMILY MEDICINE

## 2024-03-20 PROCEDURE — 99213 OFFICE O/P EST LOW 20 MIN: CPT | Mod: S$GLB,,, | Performed by: FAMILY MEDICINE

## 2024-03-20 RX ORDER — FLUTICASONE PROPIONATE 50 MCG
1 SPRAY, SUSPENSION (ML) NASAL DAILY
Qty: 9.9 ML | Refills: 0 | Status: SHIPPED | OUTPATIENT
Start: 2024-03-20

## 2024-03-20 RX ORDER — PROMETHAZINE HYDROCHLORIDE AND DEXTROMETHORPHAN HYDROBROMIDE 6.25; 15 MG/5ML; MG/5ML
5 SYRUP ORAL NIGHTLY PRN
Qty: 118 ML | Refills: 0 | Status: SHIPPED | OUTPATIENT
Start: 2024-03-20 | End: 2024-03-30

## 2024-03-20 NOTE — PROGRESS NOTES
Subjective:      Patient ID: David Nicole Jr. is a 63 y.o. male.    Vitals:  weight is 92.8 kg (204 lb 9.4 oz). His oral temperature is 98.2 °F (36.8 °C). His blood pressure is 160/94 (abnormal) and his pulse is 60. His respiration is 20 and oxygen saturation is 98%.     Chief Complaint: Sore Throat    63 year old male presents with sore throat and congestion that started yesterday.    Sore Throat   The current episode started yesterday. The problem has been unchanged. Neither side of throat is experiencing more pain than the other. There has been no fever. The pain is at a severity of 2/10. The pain is mild. Associated symptoms include congestion, coughing, headaches and trouble swallowing. Pertinent negatives include no abdominal pain, diarrhea, drooling, ear discharge, ear pain, hoarse voice, plugged ear sensation, neck pain, shortness of breath, stridor, swollen glands or vomiting. He has had exposure to strep. He has tried nothing for the symptoms.       HENT:  Positive for congestion, sore throat and trouble swallowing. Negative for ear pain, ear discharge and drooling.    Neck: Negative for neck pain.   Respiratory:  Positive for cough. Negative for shortness of breath and stridor.    Gastrointestinal:  Negative for abdominal pain, vomiting and diarrhea.   Neurological:  Positive for headaches.      Objective:     Physical Exam   Constitutional: He does not appear ill. No distress. normal  HENT:   Head: Normocephalic and atraumatic.   Ears:   Right Ear: Tympanic membrane and ear canal normal.   Left Ear: Tympanic membrane and ear canal normal.   Nose: Congestion present.   Mouth/Throat: Mucous membranes are moist. Posterior oropharyngeal erythema present. No oropharyngeal exudate.   Eyes: Pupils are equal, round, and reactive to light. Extraocular movement intact   Neck: Neck supple.   Cardiovascular: Normal rate, regular rhythm, normal heart sounds and normal pulses.   Pulmonary/Chest: Effort normal and  breath sounds normal.   Abdominal: Normal appearance.   Lymphadenopathy:     He has no cervical adenopathy.   Neurological: He is alert.   Nursing note and vitals reviewed.    Results for orders placed or performed in visit on 03/20/24   SARS Coronavirus 2 Antigen, POCT Manual Read   Result Value Ref Range    SARS Coronavirus 2 Antigen Negative Negative     Acceptable Yes    POCT Strep A, Molecular   Result Value Ref Range    Molecular Strep A, POC Negative Negative     Acceptable Yes         Assessment:     1. Seasonal allergies    2. Sore throat        Plan:       Seasonal allergies  -     promethazine-dextromethorphan (PROMETHAZINE-DM) 6.25-15 mg/5 mL Syrp; Take 5 mLs by mouth nightly as needed (cough).  Dispense: 118 mL; Refill: 0  -     fluticasone propionate (FLONASE) 50 mcg/actuation nasal spray; 1 spray (50 mcg total) by Each Nostril route once daily.  Dispense: 9.9 mL; Refill: 0    Sore throat  -     SARS Coronavirus 2 Antigen, POCT Manual Read  -     POCT Strep A, Molecular  -     diphenhydrAMINE-aluminum-magnesium hydroxide-simethicone-LIDOcaine viscous HCl 2%; Swish and spit 15 mLs every 4 (four) hours as needed (sore throat).  Dispense: 120 each; Refill: 0

## 2024-03-20 NOTE — LETTER
March 20, 2024      [facility]  [address]  [facility] [address]  Phone: [phone]  Fax: [phone]       Patient: David Nicole   YOB: 1960  Date of Visit: 03/20/2024    To Whom It May Concern:    Eileen Nicole  was at [facility] on 03/20/2024. The patient may return to work/school on 03/21/2024 with no restrictions. If you have any questions or concerns, or if I can be of further assistance, please do not hesitate to contact me.    Sincerely,              Sulaiman Estrada MD

## 2024-04-06 ENCOUNTER — PATIENT MESSAGE (OUTPATIENT)
Dept: ADMINISTRATIVE | Facility: HOSPITAL | Age: 64
End: 2024-04-06
Payer: COMMERCIAL

## 2024-06-04 ENCOUNTER — OFFICE VISIT (OUTPATIENT)
Dept: PRIMARY CARE CLINIC | Facility: CLINIC | Age: 64
End: 2024-06-04
Payer: COMMERCIAL

## 2024-06-04 VITALS
OXYGEN SATURATION: 96 % | DIASTOLIC BLOOD PRESSURE: 80 MMHG | WEIGHT: 204.81 LBS | HEART RATE: 94 BPM | SYSTOLIC BLOOD PRESSURE: 130 MMHG | BODY MASS INDEX: 32.08 KG/M2

## 2024-06-04 DIAGNOSIS — E66.9 OBESITY (BMI 30-39.9): ICD-10-CM

## 2024-06-04 DIAGNOSIS — E11.59 HYPERTENSION ASSOCIATED WITH DIABETES: ICD-10-CM

## 2024-06-04 DIAGNOSIS — K21.9 GASTROESOPHAGEAL REFLUX DISEASE WITHOUT ESOPHAGITIS: ICD-10-CM

## 2024-06-04 DIAGNOSIS — Z12.5 PROSTATE CANCER SCREENING: ICD-10-CM

## 2024-06-04 DIAGNOSIS — E78.5 HYPERLIPIDEMIA DUE TO TYPE 2 DIABETES MELLITUS: ICD-10-CM

## 2024-06-04 DIAGNOSIS — F33.41 RECURRENT MAJOR DEPRESSIVE DISORDER, IN PARTIAL REMISSION: ICD-10-CM

## 2024-06-04 DIAGNOSIS — E11.9 TYPE 2 DIABETES MELLITUS WITHOUT COMPLICATION, WITHOUT LONG-TERM CURRENT USE OF INSULIN: ICD-10-CM

## 2024-06-04 DIAGNOSIS — I15.2 HYPERTENSION ASSOCIATED WITH DIABETES: ICD-10-CM

## 2024-06-04 DIAGNOSIS — E11.69 HYPERLIPIDEMIA DUE TO TYPE 2 DIABETES MELLITUS: ICD-10-CM

## 2024-06-04 DIAGNOSIS — Z00.00 ANNUAL PHYSICAL EXAM: Primary | ICD-10-CM

## 2024-06-04 PROCEDURE — 99999 PR PBB SHADOW E&M-EST. PATIENT-LVL IV: CPT | Mod: PBBFAC,,, | Performed by: STUDENT IN AN ORGANIZED HEALTH CARE EDUCATION/TRAINING PROGRAM

## 2024-06-04 PROCEDURE — 1159F MED LIST DOCD IN RCRD: CPT | Mod: CPTII,S$GLB,, | Performed by: STUDENT IN AN ORGANIZED HEALTH CARE EDUCATION/TRAINING PROGRAM

## 2024-06-04 PROCEDURE — 4010F ACE/ARB THERAPY RXD/TAKEN: CPT | Mod: CPTII,S$GLB,, | Performed by: STUDENT IN AN ORGANIZED HEALTH CARE EDUCATION/TRAINING PROGRAM

## 2024-06-04 PROCEDURE — 3075F SYST BP GE 130 - 139MM HG: CPT | Mod: CPTII,S$GLB,, | Performed by: STUDENT IN AN ORGANIZED HEALTH CARE EDUCATION/TRAINING PROGRAM

## 2024-06-04 PROCEDURE — 1160F RVW MEDS BY RX/DR IN RCRD: CPT | Mod: CPTII,S$GLB,, | Performed by: STUDENT IN AN ORGANIZED HEALTH CARE EDUCATION/TRAINING PROGRAM

## 2024-06-04 PROCEDURE — 3008F BODY MASS INDEX DOCD: CPT | Mod: CPTII,S$GLB,, | Performed by: STUDENT IN AN ORGANIZED HEALTH CARE EDUCATION/TRAINING PROGRAM

## 2024-06-04 PROCEDURE — 99396 PREV VISIT EST AGE 40-64: CPT | Mod: S$GLB,,, | Performed by: STUDENT IN AN ORGANIZED HEALTH CARE EDUCATION/TRAINING PROGRAM

## 2024-06-04 PROCEDURE — 3079F DIAST BP 80-89 MM HG: CPT | Mod: CPTII,S$GLB,, | Performed by: STUDENT IN AN ORGANIZED HEALTH CARE EDUCATION/TRAINING PROGRAM

## 2024-06-04 RX ORDER — VERAPAMIL HYDROCHLORIDE 240 MG/1
240 CAPSULE, EXTENDED RELEASE ORAL DAILY
Qty: 90 CAPSULE | Refills: 3 | Status: SHIPPED | OUTPATIENT
Start: 2024-06-04

## 2024-06-04 RX ORDER — OMEPRAZOLE 20 MG/1
CAPSULE, DELAYED RELEASE ORAL
Qty: 90 CAPSULE | Refills: 3 | Status: SHIPPED | OUTPATIENT
Start: 2024-06-04

## 2024-06-04 RX ORDER — ATORVASTATIN CALCIUM 80 MG/1
80 TABLET, FILM COATED ORAL NIGHTLY
Qty: 90 TABLET | Refills: 3 | Status: SHIPPED | OUTPATIENT
Start: 2024-06-04

## 2024-06-04 NOTE — PROGRESS NOTES
SUBJECTIVE     Chief Complaint   Patient presents with    Annual Exam       HPI  David Nicole Jr. is a 64 y.o. male with medical diagnoses as listed in the medical history and problem list that presents for annual exam.     PCP: Dr. Estelle Parker    Pt is UTD on age appropriate CA screening.    Family, social, surgical Hx reviewed     Hypertension:  On verapamil 240 mg daily, losartan 100 mg daily, tolerating without issues.      Type 2 diabetes: On semaglutide 1 mg weekly, tolerating without issues.      GERD:  Prilosec p.r.n.     MDD:  Wellbutrin 300 mg daily and fluoxetine 40 mg daily,     Hyperlipidemia:  Lipitor 80 mg nightly.    Health Maintenance         Date Due Completion Date    Shingles Vaccine (1 of 2) Never done ---    Pneumococcal Vaccines (Age 0-64) (2 of 2 - PCV) 09/01/2021 9/1/2020    RSV Vaccine (Age 60+ and Pregnant patients) (1 - 1-dose 60+ series) Never done ---    COVID-19 Vaccine (4 - 2023-24 season) 09/01/2023 10/23/2021    Eye Exam 12/13/2023 12/13/2022    Override on 8/17/2020: Done (Dr. Poe)    Hemoglobin A1c 01/07/2024 7/7/2023    Foot Exam 07/06/2024 7/6/2023 (Done)    Override on 7/6/2023: Done    Override on 9/1/2020: Done    PROSTATE-SPECIFIC ANTIGEN 07/07/2024 7/7/2023    Diabetes Urine Screening 07/07/2024 7/7/2023    Lipid Panel 07/07/2024 7/7/2023    Influenza Vaccine (Season Ended) 09/01/2024 11/14/2022    Low Dose Statin 06/04/2025 6/4/2024    TETANUS VACCINE 01/17/2027 1/17/2017    Colorectal Cancer Screening 02/26/2029 2/26/2024              PAST MEDICAL HISTORY:  Past Medical History:   Diagnosis Date    Anxiety     Arthritis     Baker's cyst     Diabetes mellitus, type 2     H/O peptic ulcer 02/27/2018    H/O type 2 diabetes mellitus 02/27/2018    Hyperlipidemia     Hypertension     Insomnia     Intractable migraine with aura with status migrainosus 08/17/2016    GONB (Greater Occipital Nerve Block):2016    Joint pain     Knee pain     HILARIA (obstructive sleep apnea)      Prediabetes        PAST SURGICAL HISTORY:  Past Surgical History:   Procedure Laterality Date    BACK SURGERY      COLONOSCOPY N/A 02/13/2017    Procedure: COLONOSCOPY;  Surgeon: Rich James MD;  Location: [address]);  Service: Endoscopy;  Laterality: N/A;    COLONOSCOPY N/A 01/26/2024    Procedure: COLONOSCOPY;  Surgeon: Himanshu Contreras MD;  Location: [facility] ENDOSCOPY;  Service: Endoscopy;  Laterality: N/A;  Referral Estelle Fabian. Suprep Instructions to portal. Pt. on [facility] Ozempic and instructed to hOLD 7  days prior to Procedure. Will discuss plan for Diabetes with Estelle Fabian MD.  1/19- lvm and portal msg for pc. DBM  1/22- pc complete. DBM    COLONOSCOPY N/A 02/26/2024    Procedure: COLONOSCOPY;  Surgeon: Himanshu Contreras MD;  Location: [facility] ENDOSCOPY;  Service: Endoscopy;  Laterality: N/A;  r.s poor prep on 1.26; peg sent to [facility]; instr handed to pt; pt takes ozempic on Tuesday - instr pt to hold 2-19 dose; pt verbalized understanding; AP  2/19- pc complete. DBM  2.23 precall complete; all ques answered; pt did hold ozempic dose on 2.19; AP    SPINE SURGERY      TONSILLECTOMY      VASECTOMY      WISDOM TOOTH EXTRACTION         SOCIAL HISTORY:  Social History     Socioeconomic History    Marital status:    Tobacco Use    Smoking status: Never     Passive exposure: Never    Smokeless tobacco: Never   Substance and Sexual Activity    Alcohol use: Yes     Alcohol/week: 3.0 standard drinks of alcohol     Types: 3 Cans of beer per week     Comment: occasional     Drug use: No    Sexual activity: Yes     Partners: Female   Social History Narrative    Maintenance/ at school.        FAMILY HISTORY:  Family History   Problem Relation Name Age of Onset    Headaches Mother      Heart disease Father David Nicole Sr,        ALLERGIES AND MEDICATIONS: updated and reviewed.  Review of patient's allergies indicates:  No Known Allergies  Current Outpatient Medications   Medication Sig Dispense  Refill    albuterol (VENTOLIN HFA) 90 mcg/actuation inhaler Inhale 2 puffs into the lungs every 6 (six) hours as needed for Wheezing. Rescue 18 g 0    ascorbic acid, vitamin C, (VITAMIN C) 100 MG tablet Take 100 mg by mouth once daily.      atorvastatin (LIPITOR) 80 MG tablet Take 1 tablet (80 mg total) by mouth every evening. 90 tablet 3    buPROPion (WELLBUTRIN XL) 300 MG 24 hr tablet Take 1 tablet (300 mg total) by mouth once daily. 90 tablet 3    CYANOCOBALAMIN, VITAMIN B-12, (VITAMIN B-12 ORAL) Take by mouth.      diphenhydrAMINE-aluminum-magnesium hydroxide-simethicone-LIDOcaine viscous HCl 2% Swish and spit 15 mLs every 4 (four) hours as needed (sore throat). 120 each 0    ferrous sulfate 325 (65 FE) MG EC tablet Take 325 mg by mouth once daily.      FLUoxetine 40 MG capsule Take 2 capsules (80 mg total) by mouth every morning. 180 capsule 3    fluticasone propionate (FLONASE) 50 mcg/actuation nasal spray 2 sprays (100 mcg total) by Each Nostril route once daily. 48 g 3    fluticasone propionate (FLONASE) 50 mcg/actuation nasal spray 1 spray (50 mcg total) by Each Nostril route once daily. 9.9 mL 0    losartan (COZAAR) 100 MG tablet Take 1 tablet (100 mg total) by mouth once daily. 90 tablet 3    multivitamin (THERAGRAN) per tablet Take 1 tablet by mouth once daily.      omeprazole (PRILOSEC) 20 MG capsule TAKE 1 CAPSULE BY MOUTH  EVERY MORNING TO CONTROL  GERD 90 capsule 3    ondansetron (ZOFRAN-ODT) 8 MG TbDL Take 1 tablet (8 mg total) by mouth every 24 hours as needed (nausea). (Patient not taking: Reported on 3/20/2024) 30 tablet 1    semaglutide (OZEMPIC) 1 mg/dose (4 mg/3 mL) Inject 1 mg into the skin every 7 days. 12 mL 3    verapamiL (VERELAN) 240 MG C24P Take 1 capsule (240 mg total) by mouth once daily. 90 capsule 3    vitamin D (VITAMIN D3) 1000 units Tab Take 1,000 Units by mouth once daily.       No current facility-administered medications for this visit.       ROS  Review of Systems    Constitutional:  Negative for fever and weight loss.   Respiratory:  Negative for cough and shortness of breath.    Cardiovascular:  Negative for chest pain and palpitations.   Gastrointestinal:  Negative for abdominal pain, constipation, diarrhea, nausea and vomiting.   Genitourinary:  Negative for dysuria.   Musculoskeletal:  Negative for back pain and joint pain.   Skin:  Negative for rash.   Neurological:  Negative for dizziness, weakness and headaches.   Psychiatric/Behavioral:  Negative for depression. The patient is not nervous/anxious.            OBJECTIVE     Physical Exam  Vitals:    06/04/24 1143   BP: 130/80   Pulse:     Body mass index is 32.08 kg/m².  Weight: 92.9 kg (204 lb 12.9 oz)         Physical Exam  HENT:      Head: Normocephalic and atraumatic.      Nose: Nose normal.      Mouth/Throat:      Mouth: Mucous membranes are moist.      Pharynx: Oropharynx is clear.   Eyes:      Extraocular Movements: Extraocular movements intact.      Conjunctiva/sclera: Conjunctivae normal.      Pupils: Pupils are equal, round, and reactive to light.   Pulmonary:      Effort: Pulmonary effort is normal.   Musculoskeletal:         General: No swelling. Normal range of motion.      Cervical back: Normal range of motion.      Right lower leg: No edema.      Left lower leg: No edema.   Skin:     General: Skin is warm.      Findings: No lesion or rash.   Neurological:      General: No focal deficit present.      Mental Status: He is alert and oriented to person, place, and time.      Motor: No weakness.               ASSESSMENT     64 y.o. male with     1. Annual physical exam    2. Hyperlipidemia due to type 2 diabetes mellitus    3. Gastroesophageal reflux disease without esophagitis    4. Hypertension associated with diabetes    5. Morbid obesity    6. Recurrent major depressive disorder, in partial remission    7. Prostate cancer screening    8. Type 2 diabetes mellitus without complication, without long-term  current use of insulin        PLAN:     1. Annual physical exam  -     TSH; Future; Expected date: 06/04/2024  -     Lipid Panel; Future; Expected date: 06/04/2024  -     Comprehensive Metabolic Panel; Future; Expected date: 06/04/2024  -     CBC Auto Differential; Future; Expected date: 06/04/2024    2. Hyperlipidemia due to type 2 diabetes mellitus  -     atorvastatin (LIPITOR) 80 MG tablet; Take 1 tablet (80 mg total) by mouth every evening.  Dispense: 90 tablet; Refill: 3  Stable on medications, continue regimen    3. Gastroesophageal reflux disease without esophagitis  -     omeprazole (PRILOSEC) 20 MG capsule; TAKE 1 CAPSULE BY MOUTH  EVERY MORNING TO CONTROL  GERD  Dispense: 90 capsule; Refill: 3  Stable on medications, continue regimen    4. Hypertension associated with diabetes  -     verapamiL (VERELAN) 240 MG C24P; Take 1 capsule (240 mg total) by mouth once daily.  Dispense: 90 capsule; Refill: 3  Stable on medications, continue regimen    5. Obesity  -     Hemoglobin A1C; Future; Expected date: 06/04/2024    6. Recurrent major depressive disorder, in partial remission  Stable on medications, continue regimen    7. Prostate cancer screening  -     PSA, Screening; Future; Expected date: 06/04/2024    8. Type 2 diabetes mellitus without complication, without long-term current use of insulin  -     Hemoglobin A1C; Future; Expected date: 06/04/2024  Stable on medications, continue regimen        Discussed age and gender appropriate screenings at this visit and encouraged a healthy diet low in simple carbohydrates, and increased physical activity.  Counseled on medically appropriate vaccines based on age and current health condition.  Screening test reviewed and discussed with patient.      RTC in 1 year     Sharifa Guzman MD  06/04/2024 11:37 AM

## 2024-06-08 ENCOUNTER — LAB VISIT (OUTPATIENT)
Dept: LAB | Facility: HOSPITAL | Age: 64
End: 2024-06-08
Attending: STUDENT IN AN ORGANIZED HEALTH CARE EDUCATION/TRAINING PROGRAM
Payer: COMMERCIAL

## 2024-06-08 DIAGNOSIS — E11.9 TYPE 2 DIABETES MELLITUS WITHOUT COMPLICATION, WITHOUT LONG-TERM CURRENT USE OF INSULIN: ICD-10-CM

## 2024-06-08 DIAGNOSIS — E66.9 OBESITY (BMI 30-39.9): ICD-10-CM

## 2024-06-08 DIAGNOSIS — Z00.00 ANNUAL PHYSICAL EXAM: ICD-10-CM

## 2024-06-08 DIAGNOSIS — Z12.5 PROSTATE CANCER SCREENING: ICD-10-CM

## 2024-06-08 LAB
ALBUMIN SERPL BCP-MCNC: 3.7 G/DL (ref 3.5–5.2)
ALP SERPL-CCNC: 92 U/L (ref 55–135)
ALT SERPL W/O P-5'-P-CCNC: 24 U/L (ref 10–44)
ANION GAP SERPL CALC-SCNC: 9 MMOL/L (ref 8–16)
ANISOCYTOSIS BLD QL SMEAR: SLIGHT
AST SERPL-CCNC: 27 U/L (ref 10–40)
BASOPHILS # BLD AUTO: 0.06 K/UL (ref 0–0.2)
BASOPHILS NFR BLD: 0.7 % (ref 0–1.9)
BILIRUB SERPL-MCNC: 0.9 MG/DL (ref 0.1–1)
BUN SERPL-MCNC: 17 MG/DL (ref 8–23)
BURR CELLS BLD QL SMEAR: ABNORMAL
CALCIUM SERPL-MCNC: 9.6 MG/DL (ref 8.7–10.5)
CHLORIDE SERPL-SCNC: 102 MMOL/L (ref 95–110)
CHOLEST SERPL-MCNC: 115 MG/DL (ref 120–199)
CHOLEST/HDLC SERPL: 2.9 {RATIO} (ref 2–5)
CO2 SERPL-SCNC: 24 MMOL/L (ref 23–29)
COMPLEXED PSA SERPL-MCNC: 1.4 NG/ML (ref 0–4)
CREAT SERPL-MCNC: 0.9 MG/DL (ref 0.5–1.4)
DIFFERENTIAL METHOD BLD: ABNORMAL
DOHLE BOD BLD QL SMEAR: PRESENT
EOSINOPHIL # BLD AUTO: 0.9 K/UL (ref 0–0.5)
EOSINOPHIL NFR BLD: 10.4 % (ref 0–8)
ERYTHROCYTE [DISTWIDTH] IN BLOOD BY AUTOMATED COUNT: 14.3 % (ref 11.5–14.5)
EST. GFR  (NO RACE VARIABLE): >60 ML/MIN/1.73 M^2
ESTIMATED AVG GLUCOSE: 105 MG/DL (ref 68–131)
GLUCOSE SERPL-MCNC: 86 MG/DL (ref 70–110)
HBA1C MFR BLD: 5.3 % (ref 4–5.6)
HCT VFR BLD AUTO: 44.6 % (ref 40–54)
HDLC SERPL-MCNC: 40 MG/DL (ref 40–75)
HDLC SERPL: 34.8 % (ref 20–50)
HGB BLD-MCNC: 14.8 G/DL (ref 14–18)
IMM GRANULOCYTES # BLD AUTO: 0.05 K/UL (ref 0–0.04)
IMM GRANULOCYTES NFR BLD AUTO: 0.6 % (ref 0–0.5)
LDLC SERPL CALC-MCNC: 64.6 MG/DL (ref 63–159)
LYMPHOCYTES # BLD AUTO: 1.1 K/UL (ref 1–4.8)
LYMPHOCYTES NFR BLD: 13.6 % (ref 18–48)
MCH RBC QN AUTO: 30.1 PG (ref 27–31)
MCHC RBC AUTO-ENTMCNC: 33.2 G/DL (ref 32–36)
MCV RBC AUTO: 91 FL (ref 82–98)
MONOCYTES # BLD AUTO: 0.8 K/UL (ref 0.3–1)
MONOCYTES NFR BLD: 9.1 % (ref 4–15)
NEUTROPHILS # BLD AUTO: 5.4 K/UL (ref 1.8–7.7)
NEUTROPHILS NFR BLD: 65.6 % (ref 38–73)
NONHDLC SERPL-MCNC: 75 MG/DL
NRBC BLD-RTO: 0 /100 WBC
PLATELET # BLD AUTO: 318 K/UL (ref 150–450)
PLATELET BLD QL SMEAR: ABNORMAL
PMV BLD AUTO: 10.5 FL (ref 9.2–12.9)
POTASSIUM SERPL-SCNC: 4.7 MMOL/L (ref 3.5–5.1)
PROT SERPL-MCNC: 6.8 G/DL (ref 6–8.4)
RBC # BLD AUTO: 4.92 M/UL (ref 4.6–6.2)
SODIUM SERPL-SCNC: 135 MMOL/L (ref 136–145)
TOXIC GRANULES BLD QL SMEAR: PRESENT
TRIGL SERPL-MCNC: 52 MG/DL (ref 30–150)
TSH SERPL DL<=0.005 MIU/L-ACNC: 1.14 UIU/ML (ref 0.4–4)
WBC # BLD AUTO: 8.26 K/UL (ref 3.9–12.7)

## 2024-06-08 PROCEDURE — 36415 COLL VENOUS BLD VENIPUNCTURE: CPT | Performed by: STUDENT IN AN ORGANIZED HEALTH CARE EDUCATION/TRAINING PROGRAM

## 2024-06-08 PROCEDURE — 85025 COMPLETE CBC W/AUTO DIFF WBC: CPT | Performed by: STUDENT IN AN ORGANIZED HEALTH CARE EDUCATION/TRAINING PROGRAM

## 2024-06-08 PROCEDURE — 80053 COMPREHEN METABOLIC PANEL: CPT | Performed by: STUDENT IN AN ORGANIZED HEALTH CARE EDUCATION/TRAINING PROGRAM

## 2024-06-08 PROCEDURE — 84443 ASSAY THYROID STIM HORMONE: CPT | Performed by: STUDENT IN AN ORGANIZED HEALTH CARE EDUCATION/TRAINING PROGRAM

## 2024-06-08 PROCEDURE — 80061 LIPID PANEL: CPT | Performed by: STUDENT IN AN ORGANIZED HEALTH CARE EDUCATION/TRAINING PROGRAM

## 2024-06-08 PROCEDURE — 83036 HEMOGLOBIN GLYCOSYLATED A1C: CPT | Performed by: STUDENT IN AN ORGANIZED HEALTH CARE EDUCATION/TRAINING PROGRAM

## 2024-06-08 PROCEDURE — 84153 ASSAY OF PSA TOTAL: CPT | Performed by: STUDENT IN AN ORGANIZED HEALTH CARE EDUCATION/TRAINING PROGRAM

## 2024-07-17 DIAGNOSIS — E11.9 TYPE 2 DIABETES MELLITUS WITHOUT COMPLICATION: ICD-10-CM

## 2024-07-19 DIAGNOSIS — E11.59 HYPERTENSION ASSOCIATED WITH DIABETES: ICD-10-CM

## 2024-07-19 DIAGNOSIS — I15.2 HYPERTENSION ASSOCIATED WITH DIABETES: ICD-10-CM

## 2024-07-19 DIAGNOSIS — F33.41 RECURRENT MAJOR DEPRESSIVE DISORDER, IN PARTIAL REMISSION: ICD-10-CM

## 2024-07-19 RX ORDER — FLUOXETINE HYDROCHLORIDE 40 MG/1
80 CAPSULE ORAL EVERY MORNING
Qty: 180 CAPSULE | Refills: 0 | Status: SHIPPED | OUTPATIENT
Start: 2024-07-19

## 2024-07-19 RX ORDER — BUPROPION HYDROCHLORIDE 300 MG/1
300 TABLET ORAL
Qty: 90 TABLET | Refills: 0 | Status: SHIPPED | OUTPATIENT
Start: 2024-07-19

## 2024-07-19 RX ORDER — LOSARTAN POTASSIUM 100 MG/1
100 TABLET ORAL
Qty: 90 TABLET | Refills: 0 | Status: SHIPPED | OUTPATIENT
Start: 2024-07-19

## 2024-07-19 NOTE — TELEPHONE ENCOUNTER
No care due was identified.  [facility] Embedded Care Due Messages. Reference number: [id number].   7/19/2024 4:06:25 AM CDT

## 2024-07-19 NOTE — TELEPHONE ENCOUNTER
Refill Decision Note   David Nicole  is requesting a refill authorization.  Brief Assessment and Rationale for Refill:  Approve     Medication Therapy Plan:         Comments:     Note composed:7:39 AM 07/19/2024

## 2024-07-31 DIAGNOSIS — E11.9 TYPE 2 DIABETES MELLITUS WITHOUT COMPLICATION, UNSPECIFIED WHETHER LONG TERM INSULIN USE: ICD-10-CM

## 2024-09-09 NOTE — PROGRESS NOTES
No chief complaint on file.        63 y.o. male presents with concern for increased muscle pain and weakness in his upper throat when eating. He is able to tolerate PO, but notes that it has become more difficult. No voice changes or shortness of breath.      Review of Systems     Constitutional: Negative for fatigue and unexpected weight change.   HENT: per HPI.  Eyes: Negative for visual disturbance.   Respiratory: Negative for shortness of breath, hemoptysis  Cardiovascular: Negative for chest pain and palpitations.   Genitourinary: Negative for dysuria and difficulty urinating.   Musculoskeletal: Negative for decreased ROM, back pain.   Skin: Negative for rash, sunburn, itching.   Neurological: Negative for dizziness and seizures.   Hematological: Negative for adenopathy. Does not bruise/bleed easily.   Psychiatric/Behavioral: Negative for agitation. The patient is not nervous/anxious.   Endocrine: Negative for rapid weight loss/weight gain, heat/cold intolerance.     Past Medical History:   Diagnosis Date    Anxiety     Arthritis     Baker's cyst     Diabetes mellitus, type 2     H/O peptic ulcer 02/27/2018    H/O type 2 diabetes mellitus 02/27/2018    Hyperlipidemia     Hypertension     Insomnia     Intractable migraine with aura with status migrainosus 08/17/2016    GONB (Greater Occipital Nerve Block):2016    Joint pain     Knee pain     HILARIA (obstructive sleep apnea)     Prediabetes        Past Surgical History:   Procedure Laterality Date    BACK SURGERY      COLONOSCOPY N/A 2/13/2017    Procedure: COLONOSCOPY;  Surgeon: Rich James MD;  Location: [address]);  Service: Endoscopy;  Laterality: N/A;    COLONOSCOPY N/A 1/26/2024    Procedure: COLONOSCOPY;  Surgeon: Himanshu Contreras MD;  Location: [facility] ENDOSCOPY;  Service: Endoscopy;  Laterality: N/A;  Referral Estelle Fabian. Suprep Instructions to portal. Pt. on [facility] Ozempic and instructed to hOLD 7  days prior to Procedure. Will discuss plan for  Diabetes with Estelle Fabian MD.  1/19- lvm and portal msg for pc. DBM  1/22- pc complete. DBM    COLONOSCOPY N/A 2/26/2024    Procedure: COLONOSCOPY;  Surgeon: Himanshu Contreras MD;  Location: [facility] ENDOSCOPY;  Service: Endoscopy;  Laterality: N/A;  r.s poor prep on 1.26; [facility]; instr handed to pt; pt takes ozempic on Tuesday - instr pt to hold 2-19 dose; pt verbalized understanding; AP  2/19- pc complete. DBM  2.23 precall complete; all ques answered; pt did hold ozempic dose on 2.19; AP    SPINE SURGERY      TONSILLECTOMY      VASECTOMY      WISDOM TOOTH EXTRACTION         family history includes Headaches in his mother; Heart disease in his father.    Pt  reports that he has never smoked. He has never been exposed to tobacco smoke. He has never used smokeless tobacco. He reports current alcohol use. He reports that he does not use drugs.    Review of patient's allergies indicates:  No Known Allergies     Physical Exam    Vitals:    02/28/24 1006   BP: (!) 136/90   Pulse: 87     Body mass index is 31.35 kg/m².    Physical Exam  Vitals and nursing note reviewed.   Constitutional:       General: He is not in acute distress.     Appearance: He is well-developed. He is not diaphoretic.   HENT:      Head: Normocephalic and atraumatic.      Right Ear: Hearing and external ear normal. No decreased hearing noted.      Left Ear: Hearing and external ear normal. No decreased hearing noted.      Nose: Nose normal.      Mouth/Throat:      Mouth: Mucous membranes are moist. No oral lesions.      Tongue: No lesions.      Palate: No mass and lesions.      Pharynx: Oropharynx is clear. Uvula midline.   Eyes:      General: Lids are normal.         Right eye: No discharge.         Left eye: No discharge.      Conjunctiva/sclera: Conjunctivae normal.      Pupils: Pupils are equal, round, and reactive to light.   Neck:      Thyroid: No thyroid mass or thyromegaly.      Trachea: Trachea and phonation normal. No tracheal  tenderness or tracheal deviation.   Cardiovascular:      Heart sounds: Normal heart sounds.   Pulmonary:      Breath sounds: Normal breath sounds. No stridor.   Abdominal:      Palpations: Abdomen is soft.   Musculoskeletal:      Cervical back: Normal range of motion and neck supple. No edema or erythema.   Lymphadenopathy:      Cervical: No cervical adenopathy.   Skin:     General: Skin is warm and dry.      Coloration: Skin is not pale.      Findings: No erythema or rash.   Neurological:      Mental Status: He is alert and oriented to person, place, and time.        Procedure: Flexible laryngoscopy  In order to fully examine the upper aerodigestive tract, including the larynx, in a patient with a hyperactive gag reflex, flexible endoscopy is required.  After explaining the procedure and obtaining verbal consent, a timeout was performed with the patient's participation according to the universal protocol. Both nasal cavities were anesthetized with 4% Xylocaine spray mixed with Devonte-Synephrine. The flexible laryngoscope was inserted into the nasal cavity and advanced to visualize the nasal cavity, nasopharynx, the posterior oropharynx, hypopharynx, and the endolarynx with the above findings noted. The scope was removed and the procedure terminated. The patient tolerated this procedure well without apparent complication.      Nasopharynx - the torus is clear. There are no lesions of the posterior wall.   Oropharynx - no lesions of the tongue base. There is no obvious fullness or asymmetry.  Hypopharynx - there are no lesions of the pyriform sinuses or postcricoid region   Larynx - there are no lesions of the supraglottic or glottic larynx. Vocal fold mobility is normal with complete closure.      Assessment     1. Dysphagia, oropharyngeal phase          Plan  In summary, Mr. Nicole is a 63 year old male with new concern for dysphagia. No abnormality on flexible laryngoscopy today, reassurance given. MBSS ordered. I  Detail Level: Zone will contact him with the results and recommendations. All questions were answered and he is in agreement with the plan.

## 2024-09-16 ENCOUNTER — TELEPHONE (OUTPATIENT)
Dept: PRIMARY CARE CLINIC | Facility: CLINIC | Age: 64
End: 2024-09-16
Payer: COMMERCIAL

## 2024-09-16 DIAGNOSIS — E11.9 TYPE 2 DIABETES MELLITUS WITHOUT COMPLICATION, WITHOUT LONG-TERM CURRENT USE OF INSULIN: Primary | ICD-10-CM

## 2024-09-16 NOTE — TELEPHONE ENCOUNTER
----- Message from Vincent Farfan sent at 9/16/2024  1:27 PM CDT -----  Contact: [phone]  type: Lab    Caller is requesting to schedule their Lab appointment prior to an appointment.    Order is not listed in EPIC.  Please enter order and contact patient to schedule.    Thank you

## 2024-09-16 NOTE — TELEPHONE ENCOUNTER
----- Message from Lucretiawillie Swanson sent at 9/16/2024  7:57 AM CDT -----  Contact: Mobile  [phone]  Requesting an RX refill or new RX.    Is this a refill or new RX: Refill    RX name and strength semaglutide (OZEMPIC) 1 mg/dose (4 mg/3 mL)    Is this a 30 day or 90 day RX: 12 mL    Pharmacy name and phone # [id number] - [facility] [address] [facility] AT [facility] [address] [address]  Phone: [phone] Fax: [phone]        The doctors have asked that we provide their patients with the following 2 reminders -- prescription refills can take up to 72 hours, and a friendly reminder that in the future you can use your [facility] account to request refills:   Comment: Estelle Parker is the doctor who prescribed the script for the patient.

## 2024-09-19 DIAGNOSIS — E11.9 TYPE 2 DIABETES MELLITUS WITHOUT COMPLICATION, WITHOUT LONG-TERM CURRENT USE OF INSULIN: ICD-10-CM

## 2024-09-19 RX ORDER — SEMAGLUTIDE 1.34 MG/ML
1 INJECTION, SOLUTION SUBCUTANEOUS
Qty: 12 ML | Refills: 3 | Status: SHIPPED | OUTPATIENT
Start: 2024-09-19

## 2024-09-19 NOTE — TELEPHONE ENCOUNTER
----- Message from Jacki Estrada sent at 9/19/2024  1:54 PM CDT -----  Contact: Patient @ [phone]  Caller is requesting an earlier appointment then we can schedule.  Caller is requesting a message be sent to the provider.    If this is for urgent care symptoms, did you offer other providers at this location, providers at other locations, or [facility]? (yes, no, n/a):  no    If this is for the patients physical, did you offer to schedule next available and put on wait list, or to see NP or PA for their physical?  (yes, no, n/a):  yes    When is the next available appointment with their provider:  12/17/24    Reason for the appointment:  annual/refills    Patient preference of timeframe to be scheduled:  September-November    Would the patient like a call back, or a response through their [facility] portal?:   Call    Comments:  He prefers to just get a refill for Ozempic, but if e has to come in he will. He is confused bc he was just seen in June.

## 2024-09-19 NOTE — TELEPHONE ENCOUNTER
Care Due:                  Date            Visit Type   Department     Provider  --------------------------------------------------------------------------------                                EP -                              PRIMARY      [facility] PR[facility]  Last Visit: 06-      CARE ([facility])   JANE Guzman                              EP -                              PRIMARY      [facility] PRIMARY  Next Visit: 12-      CARE ([facility])   CARE           Estelle Parker                                                            Last  Test          Frequency    Reason                     Performed    Due Date  --------------------------------------------------------------------------------    HBA1C.......  6 months...  semaglutide..............  06- 12-    Health [facility] Embedded Care Due Messages. Reference number: [id number].   9/19/2024 2:11:33 PM CDT

## 2024-09-25 DIAGNOSIS — I15.2 HYPERTENSION ASSOCIATED WITH DIABETES: ICD-10-CM

## 2024-09-25 DIAGNOSIS — E11.59 HYPERTENSION ASSOCIATED WITH DIABETES: ICD-10-CM

## 2024-09-25 DIAGNOSIS — F33.41 RECURRENT MAJOR DEPRESSIVE DISORDER, IN PARTIAL REMISSION: ICD-10-CM

## 2024-09-25 RX ORDER — FLUOXETINE HYDROCHLORIDE 40 MG/1
80 CAPSULE ORAL EVERY MORNING
Qty: 180 CAPSULE | Refills: 0 | Status: SHIPPED | OUTPATIENT
Start: 2024-09-25

## 2024-09-25 RX ORDER — BUPROPION HYDROCHLORIDE 300 MG/1
300 TABLET ORAL
Qty: 90 TABLET | Refills: 0 | Status: SHIPPED | OUTPATIENT
Start: 2024-09-25

## 2024-09-25 RX ORDER — LOSARTAN POTASSIUM 100 MG/1
100 TABLET ORAL
Qty: 90 TABLET | Refills: 0 | Status: SHIPPED | OUTPATIENT
Start: 2024-09-25

## 2024-09-25 NOTE — TELEPHONE ENCOUNTER
Refill Decision Note   David Nicole  is requesting a refill authorization.  Brief Assessment and Rationale for Refill:  Approve     Medication Therapy Plan:         Comments:     Note composed:6:35 PM 09/25/2024

## 2024-09-25 NOTE — TELEPHONE ENCOUNTER
No care due was identified.  Health [facility] Embedded Care Due Messages. Reference number: [id number].   9/25/2024 4:18:48 AM CDT

## 2024-12-09 DIAGNOSIS — I15.2 HYPERTENSION ASSOCIATED WITH DIABETES: ICD-10-CM

## 2024-12-09 DIAGNOSIS — F33.41 RECURRENT MAJOR DEPRESSIVE DISORDER, IN PARTIAL REMISSION: ICD-10-CM

## 2024-12-09 DIAGNOSIS — E11.59 HYPERTENSION ASSOCIATED WITH DIABETES: ICD-10-CM

## 2024-12-09 RX ORDER — BUPROPION HYDROCHLORIDE 300 MG/1
300 TABLET ORAL
Qty: 90 TABLET | Refills: 3 | Status: SHIPPED | OUTPATIENT
Start: 2024-12-09

## 2024-12-09 RX ORDER — LOSARTAN POTASSIUM 100 MG/1
100 TABLET ORAL
Qty: 90 TABLET | Refills: 3 | Status: SHIPPED | OUTPATIENT
Start: 2024-12-09

## 2024-12-09 NOTE — TELEPHONE ENCOUNTER
Care Due:                  Date            Visit Type   Department     Provider  --------------------------------------------------------------------------------                                EP -                              PRIMARY      [facility] PRIMARY  Last Visit: 06-      CARE ([facility])   CARE           Sharifa Guzman                              EP -                              PRIMARY      [facility] PRIMARY  Next Visit: 12-      CARE ([facility])   CARE           Estelle Parker                                                            Last  Test          Frequency    Reason                     Performed    Due Date  --------------------------------------------------------------------------------    HBA1C.......  6 months...  semaglutide..............  06- 12-    Health [facility] Embedded Care Due Messages. Reference number: [id number].   12/09/2024 4:02:02 AM CST

## 2024-12-10 DIAGNOSIS — F33.41 RECURRENT MAJOR DEPRESSIVE DISORDER, IN PARTIAL REMISSION: ICD-10-CM

## 2024-12-10 RX ORDER — FLUOXETINE HYDROCHLORIDE 40 MG/1
80 CAPSULE ORAL EVERY MORNING
Qty: 180 CAPSULE | Refills: 3 | Status: SHIPPED | OUTPATIENT
Start: 2024-12-10

## 2024-12-10 NOTE — TELEPHONE ENCOUNTER
No care due was identified.  Health [facility] Embedded Care Due Messages. Reference number: [id number].   12/10/2024 4:05:23 AM CST

## 2024-12-13 ENCOUNTER — LAB VISIT (OUTPATIENT)
Dept: LAB | Facility: HOSPITAL | Age: 64
End: 2024-12-13
Attending: INTERNAL MEDICINE
Payer: COMMERCIAL

## 2024-12-13 DIAGNOSIS — E11.9 TYPE 2 DIABETES MELLITUS WITHOUT COMPLICATION, WITHOUT LONG-TERM CURRENT USE OF INSULIN: ICD-10-CM

## 2024-12-13 LAB
ESTIMATED AVG GLUCOSE: 105 MG/DL (ref 68–131)
HBA1C MFR BLD: 5.3 % (ref 4–5.6)

## 2024-12-13 PROCEDURE — 83036 HEMOGLOBIN GLYCOSYLATED A1C: CPT | Performed by: INTERNAL MEDICINE

## 2024-12-13 PROCEDURE — 36415 COLL VENOUS BLD VENIPUNCTURE: CPT | Performed by: INTERNAL MEDICINE

## 2024-12-13 NOTE — TELEPHONE ENCOUNTER
No care due was identified.  Health [facility] Embedded Care Due Messages. Reference number: [id number].   12/13/2024 4:34:24 PM CST

## 2024-12-16 RX ORDER — FLUTICASONE PROPIONATE 50 MCG
SPRAY, SUSPENSION (ML) NASAL
Qty: 48 G | Refills: 3 | Status: SHIPPED | OUTPATIENT
Start: 2024-12-16

## 2024-12-17 ENCOUNTER — PATIENT OUTREACH (OUTPATIENT)
Dept: ADMINISTRATIVE | Facility: HOSPITAL | Age: 64
End: 2024-12-17
Payer: COMMERCIAL

## 2024-12-17 ENCOUNTER — OFFICE VISIT (OUTPATIENT)
Dept: PRIMARY CARE CLINIC | Facility: CLINIC | Age: 64
End: 2024-12-17
Payer: COMMERCIAL

## 2024-12-17 VITALS
OXYGEN SATURATION: 98 % | HEART RATE: 86 BPM | WEIGHT: 201.06 LBS | BODY MASS INDEX: 31.56 KG/M2 | DIASTOLIC BLOOD PRESSURE: 84 MMHG | HEIGHT: 67 IN | SYSTOLIC BLOOD PRESSURE: 136 MMHG

## 2024-12-17 DIAGNOSIS — E11.59 HYPERTENSION ASSOCIATED WITH DIABETES: ICD-10-CM

## 2024-12-17 DIAGNOSIS — I15.2 HYPERTENSION ASSOCIATED WITH DIABETES: ICD-10-CM

## 2024-12-17 DIAGNOSIS — E11.9 TYPE 2 DIABETES MELLITUS WITHOUT COMPLICATION, WITHOUT LONG-TERM CURRENT USE OF INSULIN: ICD-10-CM

## 2024-12-17 DIAGNOSIS — K21.9 GASTROESOPHAGEAL REFLUX DISEASE WITHOUT ESOPHAGITIS: ICD-10-CM

## 2024-12-17 DIAGNOSIS — G43.919 INTRACTABLE MIGRAINE WITHOUT STATUS MIGRAINOSUS, UNSPECIFIED MIGRAINE TYPE: ICD-10-CM

## 2024-12-17 DIAGNOSIS — E78.5 HYPERLIPIDEMIA DUE TO TYPE 2 DIABETES MELLITUS: ICD-10-CM

## 2024-12-17 DIAGNOSIS — Z00.00 ANNUAL PHYSICAL EXAM: ICD-10-CM

## 2024-12-17 DIAGNOSIS — E11.69 HYPERLIPIDEMIA DUE TO TYPE 2 DIABETES MELLITUS: ICD-10-CM

## 2024-12-17 DIAGNOSIS — F33.41 RECURRENT MAJOR DEPRESSIVE DISORDER, IN PARTIAL REMISSION: Primary | ICD-10-CM

## 2024-12-17 DIAGNOSIS — E66.01 MORBID OBESITY: ICD-10-CM

## 2024-12-17 DIAGNOSIS — Z12.5 SCREENING PSA (PROSTATE SPECIFIC ANTIGEN): ICD-10-CM

## 2024-12-17 PROCEDURE — 99396 PREV VISIT EST AGE 40-64: CPT | Mod: S$GLB,,, | Performed by: INTERNAL MEDICINE

## 2024-12-17 PROCEDURE — 4010F ACE/ARB THERAPY RXD/TAKEN: CPT | Mod: CPTII,S$GLB,, | Performed by: INTERNAL MEDICINE

## 2024-12-17 PROCEDURE — 1159F MED LIST DOCD IN RCRD: CPT | Mod: CPTII,S$GLB,, | Performed by: INTERNAL MEDICINE

## 2024-12-17 PROCEDURE — 3075F SYST BP GE 130 - 139MM HG: CPT | Mod: CPTII,S$GLB,, | Performed by: INTERNAL MEDICINE

## 2024-12-17 PROCEDURE — 99999 PR PBB SHADOW E&M-EST. PATIENT-LVL IV: CPT | Mod: PBBFAC,,, | Performed by: INTERNAL MEDICINE

## 2024-12-17 PROCEDURE — 3079F DIAST BP 80-89 MM HG: CPT | Mod: CPTII,S$GLB,, | Performed by: INTERNAL MEDICINE

## 2024-12-17 PROCEDURE — 3008F BODY MASS INDEX DOCD: CPT | Mod: CPTII,S$GLB,, | Performed by: INTERNAL MEDICINE

## 2024-12-17 PROCEDURE — 3044F HG A1C LEVEL LT 7.0%: CPT | Mod: CPTII,S$GLB,, | Performed by: INTERNAL MEDICINE

## 2024-12-17 RX ORDER — ONDANSETRON 8 MG/1
8 TABLET, ORALLY DISINTEGRATING ORAL
Qty: 30 TABLET | Refills: 5 | Status: SHIPPED | OUTPATIENT
Start: 2024-12-17

## 2024-12-17 RX ORDER — SEMAGLUTIDE 2.68 MG/ML
2 INJECTION, SOLUTION SUBCUTANEOUS
Qty: 3 ML | Refills: 11 | Status: SHIPPED | OUTPATIENT
Start: 2024-12-17 | End: 2025-12-17

## 2024-12-17 NOTE — PROGRESS NOTES
[facility] Note    Chief Complaint      Chief Complaint   Patient presents with    Follow-up     History of Present Illness      David Nicole Jr. is a 64 y.o. male who presents today for Dm2..  Patient comes to appointment alone.  Kaykayho: Lui    Problem List Items Addressed This Visit       Gastroesophageal reflux disease without esophagitis    Current Assessment & Plan     Stable on prilosec, no issues at present.  Depends on what he eats.         Hyperlipidemia due to type 2 diabetes mellitus    Current Assessment & Plan     Stable on lipitor 80 mg, no myalgias.  The ASCVD Risk score (Ramona ANTUNEZ, et al., 2019) failed to calculate for the following reasons:    The valid total cholesterol range is 130 to 320 mg/dL           Relevant Medications    semaglutide (OZEMPIC) 2 mg/dose (8 mg/3 mL) PnIj    Hypertension associated with diabetes    Current Assessment & Plan     BP controlled on verapamil 240 mg and losartan 100 mg daily. No CP/SOB/HA.         Relevant Medications    semaglutide (OZEMPIC) 2 mg/dose (8 mg/3 mL) PnIj    Morbid obesity    Current Assessment & Plan      Not exercising.         Recurrent major depressive disorder, in partial remission - Primary    Current Assessment & Plan     Stable on prozac 80 mg, wellbutrin  300 mg, no SI/HI/panic attacks.           Type 2 diabetes mellitus, without long-term current use of insulin    Current Assessment & Plan     A1C 5.3 in 12/2024, stable on Ozempic 1 mg. No hypoglycemia.  No issues with metformin in past.         Relevant Medications    semaglutide (OZEMPIC) 2 mg/dose (8 mg/3 mL) PnIj    Other Relevant Orders    Microalbumin/Creatinine Ratio, Urine    Hemoglobin A1C     Other Visit Diagnoses       Screening PSA (prostate specific antigen)        Relevant Orders    PSA, SCREENING    Annual physical exam        Relevant Orders    CBC Auto Differential    Comprehensive Metabolic Panel    Lipid Panel    Intractable migraine without status  migrainosus, unspecified migraine type        Relevant Medications    ondansetron (ZOFRAN-ODT) 8 MG TbDL              Health Maintenance   Topic Date Due    Shingles Vaccine (1 of 2) Never done    Pneumococcal Vaccines (Age 0-64) (2 of 2 - PCV) 09/01/2021    RSV Vaccine (Age 60+ and Pregnant patients) (1 - Risk 60-74 years 1-dose series) Never done    Eye Exam  12/13/2023    Diabetes Urine Screening  07/07/2024    COVID-19 Vaccine (4 - 2024-25 season) 12/17/2025 (Originally 9/1/2024)    PROSTATE-SPECIFIC ANTIGEN  06/08/2025    Lipid Panel  06/08/2025    Hemoglobin A1c  06/13/2025    Low Dose Statin  12/17/2025    Foot Exam  12/17/2025    TETANUS VACCINE  01/17/2027    Colorectal Cancer Screening  02/26/2029    Hepatitis C Screening  Completed    HIV Screening  Completed    Influenza Vaccine  Addressed       Past Medical History:   Diagnosis Date    Anxiety     Arthritis     Baker's cyst     Diabetes mellitus, type 2     H/O peptic ulcer 02/27/2018    H/O type 2 diabetes mellitus 02/27/2018    Hyperlipidemia     Hypertension     Insomnia     Intractable migraine with aura with status migrainosus 08/17/2016    GONB (Greater Occipital Nerve Block):2016    Joint pain     Knee pain     HILARIA (obstructive sleep apnea)     Prediabetes        Past Surgical History:   Procedure Laterality Date    BACK SURGERY      COLONOSCOPY N/A 02/13/2017    Procedure: COLONOSCOPY;  Surgeon: Rich James MD;  Location: [address]);  Service: Endoscopy;  Laterality: N/A;    COLONOSCOPY N/A 01/26/2024    Procedure: COLONOSCOPY;  Surgeon: Himanshu Contreras MD;  Location: [facility] ENDOSCOPY;  Service: Endoscopy;  Laterality: N/A;  Referral Estelle Fabian. Suprep Instructions to portal. Pt. on [facility] Ozempic and instructed to hOLD 7  days prior to Procedure. Will discuss plan for Diabetes with Estelle Fabian MD.  1/19- lvm and portal msg for pc. DBM  1/22- pc complete. DBM    COLONOSCOPY N/A 02/26/2024    Procedure: COLONOSCOPY;  Surgeon: Ben  Himanshu MUHAMMAD MD;  Location: [facility] ENDOSCOPY;  Service: Endoscopy;  Laterality: N/A;  r.s poor prep on 1.26; peg sent to [facility]; instr handed to pt; pt takes ozempic on Tuesday - instr pt to hold 2-19 dose; pt verbalized understanding; AP  2/19- pc complete. DBM  2.23 precall complete; all ques answered; pt did hold ozempic dose on 2.19; AP    SPINE SURGERY      TONSILLECTOMY      VASECTOMY      WISDOM TOOTH EXTRACTION         family history includes Cancer in his maternal cousin; Headaches in his mother; Heart disease in his father.    Social History     Tobacco Use    Smoking status: Never     Passive exposure: Never    Smokeless tobacco: Never   Substance Use Topics    Alcohol use: Yes     Alcohol/week: 3.0 standard drinks of alcohol     Types: 3 Cans of beer per week     Comment: occasional     Drug use: No       Review of Systems   Constitutional:  Negative for chills and fever.   Respiratory:  Negative for cough and shortness of breath.    Cardiovascular:  Negative for chest pain and palpitations.   Gastrointestinal:  Negative for constipation, diarrhea, nausea and vomiting.   Genitourinary:  Negative for dysuria and hematuria.   Musculoskeletal:  Negative for falls.   Neurological:  Negative for headaches.        Outpatient Encounter Medications as of 12/17/2024   Medication Sig Dispense Refill    albuterol (VENTOLIN HFA) 90 mcg/actuation inhaler Inhale 2 puffs into the lungs every 6 (six) hours as needed for Wheezing. Rescue 18 g 0    ascorbic acid, vitamin C, (VITAMIN C) 100 MG tablet Take 100 mg by mouth once daily.      atorvastatin (LIPITOR) 80 MG tablet Take 1 tablet (80 mg total) by mouth every evening. 90 tablet 3    buPROPion (WELLBUTRIN XL) 300 MG 24 hr tablet TAKE 1 TABLET BY MOUTH ONCE  DAILY 90 tablet 3    CYANOCOBALAMIN, VITAMIN B-12, (VITAMIN B-12 ORAL) Take by mouth.      ferrous sulfate 325 (65 FE) MG EC tablet Take 325 mg by mouth once daily.      FLUoxetine 40 MG capsule TAKE 2 CAPSULES BY  MOUTH EVERY  MORNING 180 capsule 3    fluticasone propionate (FLONASE) 50 mcg/actuation nasal spray 1 spray (50 mcg total) by Each Nostril route once daily. 9.9 mL 0    fluticasone propionate (FLONASE) 50 mcg/actuation nasal spray USE 2 SPRAYS IN BOTH NOSTRILS  ONCE DAILY 48 g 3    losartan (COZAAR) 100 MG tablet TAKE 1 TABLET BY MOUTH ONCE  DAILY 90 tablet 3    multivitamin (THERAGRAN) per tablet Take 1 tablet by mouth once daily.      omeprazole (PRILOSEC) 20 MG capsule TAKE 1 CAPSULE BY MOUTH  EVERY MORNING TO CONTROL  GERD 90 capsule 3    verapamiL (VERELAN) 240 MG C24P Take 1 capsule (240 mg total) by mouth once daily. 90 capsule 3    vitamin D (VITAMIN D3) 1000 units Tab Take 1,000 Units by mouth once daily.      [DISCONTINUED] ondansetron (ZOFRAN-ODT) 8 MG TbDL Take 1 tablet (8 mg total) by mouth every 24 hours as needed (nausea). 30 tablet 1    [DISCONTINUED] semaglutide (OZEMPIC) 1 mg/dose (4 mg/3 mL) Inject 1 mg into the skin every 7 days. 12 mL 3    ondansetron (ZOFRAN-ODT) 8 MG TbDL Take 1 tablet (8 mg total) by mouth every 24 hours as needed (nausea). 30 tablet 5    semaglutide (OZEMPIC) 2 mg/dose (8 mg/3 mL) PnIj Inject 2 mg into the skin every 7 days. 3 mL 11    [DISCONTINUED] buPROPion (WELLBUTRIN XL) 300 MG 24 hr tablet TAKE 1 TABLET BY MOUTH ONCE  DAILY 90 tablet 0    [DISCONTINUED] diphenhydrAMINE-aluminum-magnesium hydroxide-simethicone-LIDOcaine viscous HCl 2% Swish and spit 15 mLs every 4 (four) hours as needed (sore throat). (Patient not taking: Reported on 12/17/2024) 120 each 0    [DISCONTINUED] FLUoxetine 40 MG capsule TAKE 2 CAPSULES BY MOUTH EVERY  MORNING 180 capsule 0    [DISCONTINUED] fluticasone propionate (FLONASE) 50 mcg/actuation nasal spray 2 sprays (100 mcg total) by Each Nostril route once daily. 48 g 3    [DISCONTINUED] losartan (COZAAR) 100 MG tablet TAKE 1 TABLET BY MOUTH ONCE  DAILY 90 tablet 0     No facility-administered encounter medications on file as of 12/17/2024.     "    Review of patient's allergies indicates:  No Known Allergies    Physical Exam      Vital Signs  Pulse: 86  SpO2: 98 %  BP: 136/84  Pain Score:   5  Pain Loc: Knee  Height and Weight  Height: 5' 7" (170.2 cm)  Weight: 91.2 kg (201 lb 1 oz)  BSA (Calculated - sq m): 2.08 sq meters  BMI (Calculated): 31.5  Weight in (lb) to have BMI = 25: 159.3]    Physical Exam  Constitutional:       Appearance: He is well-developed.   HENT:      Head: Normocephalic and atraumatic.   Neck:      Thyroid: No thyromegaly.   Cardiovascular:      Rate and Rhythm: Normal rate and regular rhythm.      Heart sounds: No murmur heard.  Pulmonary:      Effort: Pulmonary effort is normal. No respiratory distress.      Breath sounds: Normal breath sounds.   Abdominal:      General: There is no distension.      Palpations: Abdomen is soft.      Tenderness: There is no abdominal tenderness.   Skin:     General: Skin is warm and dry.   Neurological:      Mental Status: He is alert and oriented to person, place, and time.   Psychiatric:         Behavior: Behavior normal.          Laboratory:  CBC:  No results for input(s): "WBC", "RBC", "HGB", "HCT", "PLT", "MCV", "MCH", "MCHC" in the last 2160 hours.  CMP:  No results for input(s): "GLU", "CALCIUM", "ALBUMIN", "PROT", "NA", "K", "CO2", "CL", "BUN", "ALKPHOS", "ALT", "AST", "BILITOT" in the last 2160 hours.    Invalid input(s): "CREATININ"  URINALYSIS:  No results for input(s): "COLORU", "CLARITYU", "SPECGRAV", "PHUR", "PROTEINUA", "GLUCOSEU", "BILIRUBINCON", "BLOODU", "WBCU", "RBCU", "BACTERIA", "MUCUS", "NITRITE", "LEUKOCYTESUR", "UROBILINOGEN", "HYALINECASTS" in the last 2160 hours.   LIPIDS:  No results for input(s): "TSH", "HDL", "CHOL", "TRIG", "LDLCALC", "CHOLHDL", "NONHDLCHOL", "TOTALCHOLEST" in the last 2160 hours.  TSH:  No results for input(s): "TSH" in the last 2160 hours.  A1C:  Recent Labs   Lab Result Units 12/13/24  0729   Hemoglobin A1C % 5.3       Radiology:  No results found in " the last 30 days.     Assessment/Plan     David Nicole Jr. is a 64 y.o.male with:    1. Recurrent major depressive disorder, in partial remission    2. Hyperlipidemia due to type 2 diabetes mellitus    3. Hypertension associated with diabetes    4. Type 2 diabetes mellitus without complication, without long-term current use of insulin  - Microalbumin/Creatinine Ratio, Urine; Future  - Hemoglobin A1C; Future  - semaglutide (OZEMPIC) 2 mg/dose (8 mg/3 mL) PnIj; Inject 2 mg into the skin every 7 days.  Dispense: 3 mL; Refill: 11    5. Gastroesophageal reflux disease without esophagitis    6. Morbid obesity    7. Screening PSA (prostate specific antigen)  - PSA, SCREENING; Future    8. Annual physical exam  - CBC Auto Differential; Future  - Comprehensive Metabolic Panel; Future  - Lipid Panel; Future    9. Intractable migraine without status migrainosus, unspecified migraine type  - ondansetron (ZOFRAN-ODT) 8 MG TbDL; Take 1 tablet (8 mg total) by mouth every 24 hours as needed (nausea).  Dispense: 30 tablet; Refill: 5        -get eye exam from Dr. Poe  -increase ozempic to 2 mg  -counseled on shingrix, RSV and prevnar 20 vaccines  -Continue current medications and maintain follow up with specialists.    -Follow up in about 6 months (around 6/17/2025) for Annual Visit.       Estelle Parker MD  [facility]

## 2024-12-17 NOTE — PROGRESS NOTES
Health Maintenance Due   Topic Date Due    Shingles Vaccine (1 of 2) Never done    Pneumococcal Vaccines (Age 0-64) (2 of 2 - PCV) 09/01/2021    RSV Vaccine (Age 60+ and Pregnant patients) (1 - Risk 60-74 years 1-dose series) Never done    Eye Exam  12/13/2023    Diabetes Urine Screening  07/07/2024     Chart review completed. HM Updated. Triggered Links. Immunizations reviewed and updated. Care Everywhere Updated. Care Team Updated.  Requested eye exam records from Dr. Ayala.

## 2024-12-17 NOTE — LETTER
AUTHORIZATION FOR RELEASE OF   CONFIDENTIAL INFORMATION    Dear ,    We are seeing David Nicole Jr., date of birth 1960, in the clinic at [facility]. Estelle Parker MD is the patient's PCP. David Nicole Jr. has an outstanding lab/procedure at the time we reviewed his chart. In order to help keep his health information updated, he has authorized us to request the following medical record(s):        (  )  MAMMOGRAM                                      (  )  COLONOSCOPY      (  )  PAP SMEAR                                          (  )  OUTSIDE LAB RESULTS     (  )  DEXA SCAN                                          (X)  DIABETIC EYE EXAM            (  )  FOOT EXAM                                          (  )  ENTIRE RECORD     (  )  OUTSIDE IMMUNIZATIONS                 (  )  _______________         Please fax records to [facility], Jordan, Jenna C., MD, [phone].     If you have any questions, please contact Jane at [phone].           Patient Name: David Nicole Jr.  : 1960  Patient Phone #: [phone]

## 2025-02-17 ENCOUNTER — TELEPHONE (OUTPATIENT)
Dept: PRIMARY CARE CLINIC | Facility: CLINIC | Age: 65
End: 2025-02-17
Payer: COMMERCIAL

## 2025-02-17 NOTE — TELEPHONE ENCOUNTER
June appt and Dec appt were coded as annuals. Ins is not covering Dec appt because it looks like he ad 2 annuals in a year. Can you change billing code on Dec appt to f/u? Scheduled for annual in June

## 2025-06-06 ENCOUNTER — TELEPHONE (OUTPATIENT)
Dept: PRIMARY CARE CLINIC | Facility: CLINIC | Age: 65
End: 2025-06-06
Payer: COMMERCIAL

## 2025-06-10 ENCOUNTER — TELEPHONE (OUTPATIENT)
Dept: PRIMARY CARE CLINIC | Facility: CLINIC | Age: 65
End: 2025-06-10
Payer: COMMERCIAL

## 2025-06-10 ENCOUNTER — OFFICE VISIT (OUTPATIENT)
Dept: URGENT CARE | Facility: CLINIC | Age: 65
End: 2025-06-10
Payer: COMMERCIAL

## 2025-06-10 VITALS
DIASTOLIC BLOOD PRESSURE: 67 MMHG | SYSTOLIC BLOOD PRESSURE: 112 MMHG | RESPIRATION RATE: 18 BRPM | BODY MASS INDEX: 31.39 KG/M2 | WEIGHT: 200 LBS | OXYGEN SATURATION: 96 % | HEIGHT: 67 IN | TEMPERATURE: 98 F | HEART RATE: 103 BPM

## 2025-06-10 DIAGNOSIS — U07.1 COVID-19 VIRUS DETECTED: ICD-10-CM

## 2025-06-10 DIAGNOSIS — U07.1 COVID: Primary | ICD-10-CM

## 2025-06-10 LAB
CTP QC/QA: YES
SARS-COV+SARS-COV-2 AG RESP QL IA.RAPID: POSITIVE

## 2025-06-10 PROCEDURE — 87811 SARS-COV-2 COVID19 W/OPTIC: CPT | Mod: QW,S$GLB,, | Performed by: NURSE PRACTITIONER

## 2025-06-10 PROCEDURE — 99213 OFFICE O/P EST LOW 20 MIN: CPT | Mod: S$GLB,,, | Performed by: NURSE PRACTITIONER

## 2025-06-10 NOTE — PROGRESS NOTES
"Subjective:      Patient ID: David Nicole Jr. is a 65 y.o. male.    Vitals:  height is 5' 7" (1.702 m) and weight is 90.7 kg (200 lb). His oral temperature is 98.3 °F (36.8 °C). His blood pressure is 112/67 and his pulse is 103. His respiration is 18 and oxygen saturation is 96%.     Chief Complaint: Cough    This is a 65 y.o. male who presents today with a chief complaint of sinus congestion, non-productive cough, headache, sore throat, fatigue and body aches. Symptoms started on Friday night. Taking Sudafed and Mucinex    Cough  This is a new problem. The current episode started in the past 7 days. The problem has been gradually worsening. The problem occurs every few minutes. The cough is Non-productive. Associated symptoms include headaches, myalgias, nasal congestion, postnasal drip, rhinorrhea and a sore throat. Pertinent negatives include no fever, shortness of breath or wheezing. Associated symptoms comments: Joint aches, and fatigue. Nothing aggravates the symptoms. He has tried OTC cough suppressant for the symptoms. The treatment provided mild relief. His past medical history is significant for environmental allergies. There is no history of asthma, bronchitis or pneumonia.       Constitution: Positive for fatigue. Negative for fever.   HENT:  Positive for congestion, postnasal drip, sinus pressure and sore throat.    Respiratory:  Positive for cough. Negative for shortness of breath and wheezing.    Gastrointestinal:  Negative for vomiting and diarrhea.   Musculoskeletal:  Positive for muscle ache.   Allergic/Immunologic: Positive for environmental allergies.   Neurological:  Positive for headaches.      Objective:     Physical Exam   Constitutional: He is oriented to person, place, and time. He appears well-developed. He is cooperative.  Non-toxic appearance. He does not appear ill. No distress.   HENT:   Head: Normocephalic.   Ears:   Right Ear: Hearing, tympanic membrane, external ear and ear " canal normal.   Left Ear: Hearing, tympanic membrane, external ear and ear canal normal.   Nose: Congestion present. No mucosal edema, rhinorrhea or nasal deformity. No epistaxis. Right sinus exhibits no maxillary sinus tenderness and no frontal sinus tenderness. Left sinus exhibits no maxillary sinus tenderness and no frontal sinus tenderness.   Mouth/Throat: Uvula is midline and mucous membranes are normal. Mucous membranes are moist. No trismus in the jaw. Normal dentition. No uvula swelling. Posterior oropharyngeal erythema present. No oropharyngeal exudate or posterior oropharyngeal edema. Oropharynx is clear.   Eyes: Conjunctivae and lids are normal. No scleral icterus.   Neck: Trachea normal and phonation normal. Neck supple. No edema present. No erythema present. No neck rigidity present.   Cardiovascular: Normal rate and regular rhythm.   Pulmonary/Chest: Effort normal and breath sounds normal. No stridor. No respiratory distress. He has no decreased breath sounds. He has no wheezes. He has no rhonchi. He has no rales. He exhibits no tenderness.   Abdominal: Normal appearance.   Musculoskeletal: Normal range of motion.         General: No deformity. Normal range of motion.   Neurological: He is alert and oriented to person, place, and time. He exhibits normal muscle tone. Coordination normal.   Skin: Skin is warm, dry, intact, not diaphoretic and not pale.   Psychiatric: His speech is normal and behavior is normal. Judgment and thought content normal.   Nursing note and vitals reviewed.    Results for orders placed or performed in visit on 06/10/25   SARS Coronavirus 2 Antigen, POCT Manual Read    Collection Time: 06/10/25 12:28 PM   Result Value Ref Range    SARS Coronavirus 2 Antigen Positive (A) Negative, Presumptive Negative     Acceptable Yes         Assessment:     1. COVID        Plan:       COVID  -     SARS Coronavirus 2 Antigen, POCT Manual Read  -     nirmatrelvir-ritonavir 300 mg  (150 mg x 2)-100 mg copackaged tablets (EUA); Take 3 tablets by mouth 2 (two) times daily for 5 days. Each dose contains 2 nirmatrelvir (pink tablets) and 1 ritonavir (white tablet). Take all 3 tablets together  Dispense: 30 tablet; Refill: 0      Patient Instructions   Do not take your cholesterol medicine (atorvastatin) for 10 days after starting paxlovid today.   Oral fluids  Rest  Steam (hot showers, hot tea)  Blow nose often  Droplet and contact precautions: good handwashing; wear mask as needed; physical distancing   OTC ibuprofen or tylenol for aches and/or fever   You no longer have to quarantine   You may return to normal activities when for at least 24 hours: your symptoms are overall improving; and you have not had a fever (without use of fever-reducing medicines)   If symptoms worsen, after returning to normal activities, return home and avoid close contact with others for at least another 24 hours   Seek ER care with symptoms such as wheeze, respiratory distress, lethargy, dehydration

## 2025-06-10 NOTE — TELEPHONE ENCOUNTER
Copied from CRM #[id number]. Topic: General Inquiry - Patient Advice  >> Lexx 10, 2025  9:20 AM Natalia wrote:  type: Lab    Caller is requesting to schedule their Lab appointment prior to an appointment.    Order is not listed in EPIC.  Please enter order and contact patient to schedule.    Preferred Date and Time of Labs:6/13/ or 6/14 in am    Date of Appointment:6/17/25    Where would they like the lab performed?[facility]    Would the patient rather a call back or a response via [facility]? callback

## 2025-06-10 NOTE — PATIENT INSTRUCTIONS
Do not take your cholesterol medicine (atorvastatin) for 10 days after starting paxlovid today.   Oral fluids  Rest  Steam (hot showers, hot tea)  Blow nose often  Droplet and contact precautions: good handwashing; wear mask as needed; physical distancing   OTC ibuprofen or tylenol for aches and/or fever   You no longer have to quarantine   You may return to normal activities when for at least 24 hours: your symptoms are overall improving; and you have not had a fever (without use of fever-reducing medicines)   If symptoms worsen, after returning to normal activities, return home and avoid close contact with others for at least another 24 hours   Seek ER care with symptoms such as wheeze, respiratory distress, lethargy, dehydration

## 2025-06-10 NOTE — TELEPHONE ENCOUNTER
Copied from CRM #[id number]. Topic: General Inquiry - Return Call  >> Lexx 10, 2025  9:28 AM Tasneem wrote:  Patient is returning a phone call.    Who left a message for the patient: Anamaria Dong MA    Does patient know what this is regarding:  orders; pt advised his orders are in but wanted to speak to the clinic staff.     Would you like a call back, or a response through your [facility] portal?:  [phone]  call back     Comments:

## 2025-06-10 NOTE — LETTER
Gisselle 10, 2025      [facility]  [address] [facility] [address]  Phone: [phone]  Fax: [phone]       Patient: David Nicole   YOB: 1960  Date of Visit: 06/10/2025    To Whom It May Concern:    Eileen Nicole  was at [facility] on 06/10/2025. The patient may return to work/school on 6/16/2025 with no restrictions. If you have any questions or concerns, or if I can be of further assistance, please do not hesitate to contact me.    Sincerely,      Carmen Staples, ERIC-BC

## 2025-06-14 ENCOUNTER — LAB VISIT (OUTPATIENT)
Dept: LAB | Facility: HOSPITAL | Age: 65
End: 2025-06-14
Payer: COMMERCIAL

## 2025-06-14 DIAGNOSIS — Z12.5 SCREENING PSA (PROSTATE SPECIFIC ANTIGEN): ICD-10-CM

## 2025-06-14 DIAGNOSIS — Z00.00 ANNUAL PHYSICAL EXAM: ICD-10-CM

## 2025-06-14 DIAGNOSIS — E11.9 TYPE 2 DIABETES MELLITUS WITHOUT COMPLICATION, WITHOUT LONG-TERM CURRENT USE OF INSULIN: ICD-10-CM

## 2025-06-14 LAB
ABSOLUTE EOSINOPHIL (OHS): 0.04 K/UL
ABSOLUTE MONOCYTE (OHS): 0.59 K/UL (ref 0.3–1)
ABSOLUTE NEUTROPHIL COUNT (OHS): 4.66 K/UL (ref 1.8–7.7)
ALBUMIN SERPL BCP-MCNC: 3.7 G/DL (ref 3.5–5.2)
ALBUMIN/CREAT UR: 8.1 UG/MG
ALP SERPL-CCNC: 87 UNIT/L (ref 40–150)
ALT SERPL W/O P-5'-P-CCNC: 131 UNIT/L (ref 10–44)
ANION GAP (OHS): 10 MMOL/L (ref 8–16)
AST SERPL-CCNC: 47 UNIT/L (ref 11–45)
BASOPHILS # BLD AUTO: 0.06 K/UL
BASOPHILS NFR BLD AUTO: 0.9 %
BILIRUB SERPL-MCNC: 0.7 MG/DL (ref 0.1–1)
BUN SERPL-MCNC: 12 MG/DL (ref 8–23)
CALCIUM SERPL-MCNC: 8.9 MG/DL (ref 8.7–10.5)
CHLORIDE SERPL-SCNC: 100 MMOL/L (ref 95–110)
CHOLEST SERPL-MCNC: 128 MG/DL (ref 120–199)
CHOLEST/HDLC SERPL: 3.8 {RATIO} (ref 2–5)
CO2 SERPL-SCNC: 25 MMOL/L (ref 23–29)
CREAT SERPL-MCNC: 0.8 MG/DL (ref 0.5–1.4)
CREAT UR-MCNC: 173 MG/DL (ref 23–375)
EAG (OHS): 108 MG/DL (ref 68–131)
ERYTHROCYTE [DISTWIDTH] IN BLOOD BY AUTOMATED COUNT: 15.8 % (ref 11.5–14.5)
GFR SERPLBLD CREATININE-BSD FMLA CKD-EPI: >60 ML/MIN/1.73/M2
GLUCOSE SERPL-MCNC: 75 MG/DL (ref 70–110)
HBA1C MFR BLD: 5.4 % (ref 4–5.6)
HCT VFR BLD AUTO: 44.6 % (ref 40–54)
HDLC SERPL-MCNC: 34 MG/DL (ref 40–75)
HDLC SERPL: 26.6 % (ref 20–50)
HGB BLD-MCNC: 14.3 GM/DL (ref 14–18)
IMM GRANULOCYTES # BLD AUTO: 0.14 K/UL (ref 0–0.04)
IMM GRANULOCYTES NFR BLD AUTO: 2.1 % (ref 0–0.5)
LDLC SERPL CALC-MCNC: 80 MG/DL (ref 63–159)
LYMPHOCYTES # BLD AUTO: 1.3 K/UL (ref 1–4.8)
MCH RBC QN AUTO: 27.8 PG (ref 27–31)
MCHC RBC AUTO-ENTMCNC: 32.1 G/DL (ref 32–36)
MCV RBC AUTO: 87 FL (ref 82–98)
MICROALBUMIN UR-MCNC: 14 UG/ML (ref ?–5000)
NONHDLC SERPL-MCNC: 94 MG/DL
NUCLEATED RBC (/100WBC) (OHS): 0 /100 WBC
PLATELET # BLD AUTO: 283 K/UL (ref 150–450)
PMV BLD AUTO: 10.3 FL (ref 9.2–12.9)
POTASSIUM SERPL-SCNC: 4.6 MMOL/L (ref 3.5–5.1)
PROT SERPL-MCNC: 6.8 GM/DL (ref 6–8.4)
PSA SERPL-MCNC: 1 NG/ML
RBC # BLD AUTO: 5.15 M/UL (ref 4.6–6.2)
RELATIVE EOSINOPHIL (OHS): 0.6 %
RELATIVE LYMPHOCYTE (OHS): 19.1 % (ref 18–48)
RELATIVE MONOCYTE (OHS): 8.7 % (ref 4–15)
RELATIVE NEUTROPHIL (OHS): 68.6 % (ref 38–73)
SODIUM SERPL-SCNC: 135 MMOL/L (ref 136–145)
TRIGL SERPL-MCNC: 70 MG/DL (ref 30–150)
WBC # BLD AUTO: 6.79 K/UL (ref 3.9–12.7)

## 2025-06-14 PROCEDURE — 83036 HEMOGLOBIN GLYCOSYLATED A1C: CPT

## 2025-06-14 PROCEDURE — 80061 LIPID PANEL: CPT

## 2025-06-14 PROCEDURE — 36415 COLL VENOUS BLD VENIPUNCTURE: CPT

## 2025-06-14 PROCEDURE — 85025 COMPLETE CBC W/AUTO DIFF WBC: CPT

## 2025-06-14 PROCEDURE — 82570 ASSAY OF URINE CREATININE: CPT

## 2025-06-14 PROCEDURE — 84153 ASSAY OF PSA TOTAL: CPT

## 2025-06-14 PROCEDURE — 80053 COMPREHEN METABOLIC PANEL: CPT

## 2025-06-16 DIAGNOSIS — E78.5 HYPERLIPIDEMIA DUE TO TYPE 2 DIABETES MELLITUS: ICD-10-CM

## 2025-06-16 DIAGNOSIS — E11.69 HYPERLIPIDEMIA DUE TO TYPE 2 DIABETES MELLITUS: ICD-10-CM

## 2025-06-16 RX ORDER — ATORVASTATIN CALCIUM 80 MG/1
80 TABLET, FILM COATED ORAL NIGHTLY
Qty: 90 TABLET | Refills: 1 | Status: SHIPPED | OUTPATIENT
Start: 2025-06-16

## 2025-06-16 NOTE — TELEPHONE ENCOUNTER
Refill Decision Note   David Nicole  is requesting a refill authorization.  Brief Assessment and Rationale for Refill:  Approve     Medication Therapy Plan:         Comments:     Note composed:11:40 AM 06/16/2025

## 2025-06-16 NOTE — TELEPHONE ENCOUNTER
No care due was identified.  [facility] Embedded Care Due Messages. Reference number: [id number].   6/16/2025 4:09:08 AM CDT

## 2025-06-17 ENCOUNTER — OFFICE VISIT (OUTPATIENT)
Dept: PRIMARY CARE CLINIC | Facility: CLINIC | Age: 65
End: 2025-06-17
Payer: COMMERCIAL

## 2025-06-17 ENCOUNTER — PATIENT OUTREACH (OUTPATIENT)
Dept: ADMINISTRATIVE | Facility: HOSPITAL | Age: 65
End: 2025-06-17
Payer: COMMERCIAL

## 2025-06-17 VITALS
BODY MASS INDEX: 31.66 KG/M2 | SYSTOLIC BLOOD PRESSURE: 136 MMHG | HEIGHT: 67 IN | OXYGEN SATURATION: 98 % | HEART RATE: 105 BPM | DIASTOLIC BLOOD PRESSURE: 82 MMHG | WEIGHT: 201.75 LBS

## 2025-06-17 DIAGNOSIS — U07.1 COVID-19: ICD-10-CM

## 2025-06-17 DIAGNOSIS — F33.41 RECURRENT MAJOR DEPRESSIVE DISORDER, IN PARTIAL REMISSION: ICD-10-CM

## 2025-06-17 DIAGNOSIS — R74.01 TRANSAMINITIS: ICD-10-CM

## 2025-06-17 DIAGNOSIS — Z00.00 ANNUAL PHYSICAL EXAM: Primary | ICD-10-CM

## 2025-06-17 DIAGNOSIS — E78.5 HYPERLIPIDEMIA DUE TO TYPE 2 DIABETES MELLITUS: ICD-10-CM

## 2025-06-17 DIAGNOSIS — E66.01 MORBID OBESITY: ICD-10-CM

## 2025-06-17 DIAGNOSIS — K21.9 GASTROESOPHAGEAL REFLUX DISEASE WITHOUT ESOPHAGITIS: ICD-10-CM

## 2025-06-17 DIAGNOSIS — I15.2 HYPERTENSION ASSOCIATED WITH DIABETES: ICD-10-CM

## 2025-06-17 DIAGNOSIS — E11.69 HYPERLIPIDEMIA DUE TO TYPE 2 DIABETES MELLITUS: ICD-10-CM

## 2025-06-17 DIAGNOSIS — E11.59 HYPERTENSION ASSOCIATED WITH DIABETES: ICD-10-CM

## 2025-06-17 DIAGNOSIS — E11.9 TYPE 2 DIABETES MELLITUS WITHOUT COMPLICATION, WITHOUT LONG-TERM CURRENT USE OF INSULIN: ICD-10-CM

## 2025-06-17 PROCEDURE — 3079F DIAST BP 80-89 MM HG: CPT | Mod: CPTII,S$GLB,, | Performed by: INTERNAL MEDICINE

## 2025-06-17 PROCEDURE — 3044F HG A1C LEVEL LT 7.0%: CPT | Mod: CPTII,S$GLB,, | Performed by: INTERNAL MEDICINE

## 2025-06-17 PROCEDURE — 4010F ACE/ARB THERAPY RXD/TAKEN: CPT | Mod: CPTII,S$GLB,, | Performed by: INTERNAL MEDICINE

## 2025-06-17 PROCEDURE — 3075F SYST BP GE 130 - 139MM HG: CPT | Mod: CPTII,S$GLB,, | Performed by: INTERNAL MEDICINE

## 2025-06-17 PROCEDURE — 99396 PREV VISIT EST AGE 40-64: CPT | Mod: S$GLB,,, | Performed by: INTERNAL MEDICINE

## 2025-06-17 PROCEDURE — 99999 PR PBB SHADOW E&M-EST. PATIENT-LVL IV: CPT | Mod: PBBFAC,,, | Performed by: INTERNAL MEDICINE

## 2025-06-17 PROCEDURE — 3008F BODY MASS INDEX DOCD: CPT | Mod: CPTII,S$GLB,, | Performed by: INTERNAL MEDICINE

## 2025-06-17 PROCEDURE — 3066F NEPHROPATHY DOC TX: CPT | Mod: CPTII,S$GLB,, | Performed by: INTERNAL MEDICINE

## 2025-06-17 PROCEDURE — 1159F MED LIST DOCD IN RCRD: CPT | Mod: CPTII,S$GLB,, | Performed by: INTERNAL MEDICINE

## 2025-06-17 PROCEDURE — 3061F NEG MICROALBUMINURIA REV: CPT | Mod: CPTII,S$GLB,, | Performed by: INTERNAL MEDICINE

## 2025-06-17 RX ORDER — VERAPAMIL HYDROCHLORIDE 240 MG/1
240 CAPSULE, DELAYED RELEASE ORAL DAILY
Qty: 90 CAPSULE | Refills: 3 | Status: SHIPPED | OUTPATIENT
Start: 2025-06-17

## 2025-06-17 NOTE — PROGRESS NOTES
[facility] Note    Chief Complaint      Chief Complaint   Patient presents with    Annual Exam     History of Present Illness      David Nicole Jr. is a 65 y.o. male who presents today for Annual preventative visit.  Patient comes to appointment alone.  Optho: [place]    Patient is a nonsmoker.  Patient reports wearing seatbelt when riding in a car.  Patient denies falls since last visit and does not use a mobility aid.      Had head congestion, cough with positive COVID diagnossi on 6/10/25, feeling better.  Taking OTC sinus meds and completed course of Paxlovid.    Problem List Items Addressed This Visit       Gastroesophageal reflux disease without esophagitis    Current Assessment & Plan   Stable on prilosec, no issues at present.  Depends on what he eats.         Hyperlipidemia due to type 2 diabetes mellitus    Current Assessment & Plan   Stable on lipitor 80 mg, no myalgias.  The ASCVD Risk score (Ramona ANTUNEZ, et al., 2019) failed to calculate for the following reasons:    The valid total cholesterol range is 130 to 320 mg/dL           Hypertension associated with diabetes    Current Assessment & Plan   BP controlled on verapamil 240 mg and losartan 100 mg daily. No CP/SOB/HA.         Relevant Medications    verapamiL (VERELAN) 240 MG C24P    Morbid obesity    Current Assessment & Plan    Not exercising.         Recurrent major depressive disorder, in partial remission    Current Assessment & Plan   Stable on prozac 80 mg, wellbutrin  300 mg, no SI/HI/panic attacks.           Type 2 diabetes mellitus, without long-term current use of insulin    Current Assessment & Plan   Not exercising and die could be better.  A1C 5.4 in 6/2025, stable on Ozempic 2 mg. No hypoglycemia.  No issues with metformin in past.          Other Visit Diagnoses         Annual physical exam    -  Primary      Transaminitis        Relevant Orders    Hepatic function panel    Gamma GT      COVID-19                     Health Maintenance   Topic Date Due    Shingles Vaccine (1 of 2) Never done    Pneumococcal Vaccines (Age 50+) (2 of 2 - PCV) 09/01/2021    RSV Vaccine (Age 60+ and Pregnant patients) (1 - Risk 60-74 years 1-dose series) Never done    Diabetic Eye Exam  12/13/2023    COVID-19 Vaccine (4 - 2024-25 season) 12/17/2025 (Originally 9/1/2024)    Hemoglobin A1c  12/14/2025    Foot Exam  12/17/2025    PROSTATE-SPECIFIC ANTIGEN  06/14/2026    Diabetes Urine Screening  06/14/2026    Lipid Panel  06/14/2026    Low Dose Statin  06/17/2026    Colorectal Cancer Screening  02/26/2029    TETANUS VACCINE  02/02/2035    Hepatitis C Screening  Completed    HIV Screening  Completed    Influenza Vaccine  Addressed       Past Medical History:   Diagnosis Date    Anxiety     Arthritis     Baker's cyst     Diabetes mellitus, type 2     H/O peptic ulcer 02/27/2018    H/O type 2 diabetes mellitus 02/27/2018    Hyperlipidemia     Hypertension     Insomnia     Intractable migraine with aura with status migrainosus 08/17/2016    GONB (Greater Occipital Nerve Block):2016    Joint pain     Knee pain     HILARIA (obstructive sleep apnea)     Prediabetes        Past Surgical History:   Procedure Laterality Date    BACK SURGERY      COLONOSCOPY N/A 02/13/2017    Procedure: COLONOSCOPY;  Surgeon: Rich James MD;  Location: [address]);  Service: Endoscopy;  Laterality: N/A;    COLONOSCOPY N/A 01/26/2024    Procedure: COLONOSCOPY;  Surgeon: Himanshu Contreras MD;  Location: [facility] ENDOSCOPY;  Service: Endoscopy;  Laterality: N/A;  Referral Estelle Fabian. Suprep Instructions to portal. Pt. on [facility] Ozempic and instructed to hOLD 7  days prior to Procedure. Will discuss plan for Diabetes with Estelle Fabian MD.  1/19- lvm and portal msg for pc. DBM  1/22- pc complete. DBM    COLONOSCOPY N/A 02/26/2024    Procedure: COLONOSCOPY;  Surgeon: Himanshu Contreras MD;  Location: [facility] ENDOS[facility];  Service: Endoscopy;  Laterality: N/A;  r.s poor prep on 1.26;  peg sent to [facility]an; instr handed to pt; pt takes ozempic on Tuesday - instr pt to hold 2-19 dose; pt verbalized understanding; AP  2/19- pc complete. DBM  2.23 precall complete; all ques answered; pt did hold ozempic dose on 2.19; AP    SPINE SURGERY      TONSILLECTOMY      VASECTOMY      WISDOM TOOTH EXTRACTION         family history includes Cancer in his maternal cousin; Headaches in his mother; Heart disease in his father.    Social History     Tobacco Use    Smoking status: Never     Passive exposure: Never    Smokeless tobacco: Never   Substance Use Topics    Alcohol use: Yes     Alcohol/week: 3.0 standard drinks of alcohol     Types: 3 Cans of beer per week     Comment: occasional     Drug use: No       Review of Systems   Constitutional:  Negative for chills and fever.   Respiratory:  Negative for cough and shortness of breath.    Cardiovascular:  Negative for chest pain and palpitations.   Gastrointestinal:  Negative for constipation, diarrhea, nausea and vomiting.   Genitourinary:  Negative for dysuria and hematuria.   Musculoskeletal:  Negative for falls.   Neurological:  Negative for headaches.        Outpatient Encounter Medications as of 6/17/2025   Medication Sig Dispense Refill    albuterol (VENTOLIN HFA) 90 mcg/actuation inhaler Inhale 2 puffs into the lungs every 6 (six) hours as needed for Wheezing. Rescue 18 g 0    ascorbic acid, vitamin C, (VITAMIN C) 100 MG tablet Take 100 mg by mouth once daily.      atorvastatin (LIPITOR) 80 MG tablet TAKE 1 TABLET BY MOUTH IN THE  EVENING 90 tablet 1    buPROPion (WELLBUTRIN XL) 300 MG 24 hr tablet TAKE 1 TABLET BY MOUTH ONCE  DAILY 90 tablet 3    CYANOCOBALAMIN, VITAMIN B-12, (VITAMIN B-12 ORAL) Take by mouth.      ferrous sulfate 325 (65 FE) MG EC tablet Take 325 mg by mouth once daily.      FLUoxetine 40 MG capsule TAKE 2 CAPSULES BY MOUTH EVERY  MORNING 180 capsule 3    fluticasone propionate (FLONASE) 50 mcg/actuation nasal spray 1 spray (50 mcg  "total) by Each Nostril route once daily. 9.9 mL 0    fluticasone propionate (FLONASE) 50 mcg/actuation nasal spray USE 2 SPRAYS IN BOTH NOSTRILS  ONCE DAILY 48 g 3    losartan (COZAAR) 100 MG tablet TAKE 1 TABLET BY MOUTH ONCE  DAILY 90 tablet 3    multivitamin (THERAGRAN) per tablet Take 1 tablet by mouth once daily.      omeprazole (PRILOSEC) 20 MG capsule TAKE 1 CAPSULE BY MOUTH  EVERY MORNING TO CONTROL  GERD 90 capsule 3    ondansetron (ZOFRAN-ODT) 8 MG TbDL Take 1 tablet (8 mg total) by mouth every 24 hours as needed (nausea). 30 tablet 5    semaglutide (OZEMPIC) 2 mg/dose (8 mg/3 mL) PnIj Inject 2 mg into the skin every 7 days. 3 mL 11    vitamin D (VITAMIN D3) 1000 units Tab Take 1,000 Units by mouth once daily.      [DISCONTINUED] verapamiL (VERELAN) 240 MG C24P Take 1 capsule (240 mg total) by mouth once daily. 90 capsule 3    verapamiL (VERELAN) 240 MG C24P Take 1 capsule (240 mg total) by mouth once daily. 90 capsule 3    [DISCONTINUED] atorvastatin (LIPITOR) 80 MG tablet Take 1 tablet (80 mg total) by mouth every evening. 90 tablet 3    [DISCONTINUED] nirmatrelvir-ritonavir 300 mg (150 mg x 2)-100 mg copackaged tablets (EUA) Take 3 tablets by mouth 2 (two) times daily for 5 days. Each dose contains 2 nirmatrelvir (pink tablets) and 1 ritonavir (white tablet). Take all 3 tablets together 30 tablet 0     No facility-administered encounter medications on file as of 6/17/2025.        Review of patient's allergies indicates:  No Known Allergies    Physical Exam      Vital Signs  Pulse: 105  SpO2: 98 %  BP: 136/82  Pain Score: 0-No pain  Height and Weight  Height: 5' 7" (170.2 cm)  Weight: 91.5 kg (201 lb 11.5 oz)  BSA (Calculated - sq m): 2.08 sq meters  BMI (Calculated): 31.6  Weight in (lb) to have BMI = 25: 159.3]    Physical Exam  Constitutional:       Appearance: He is well-developed.   HENT:      Head: Normocephalic and atraumatic.   Neck:      Thyroid: No thyromegaly.   Cardiovascular:      Rate and " "Rhythm: Normal rate and regular rhythm.      Heart sounds: No murmur heard.  Pulmonary:      Effort: Pulmonary effort is normal. No respiratory distress.      Breath sounds: Normal breath sounds.   Abdominal:      General: There is no distension.      Palpations: Abdomen is soft.      Tenderness: There is no abdominal tenderness.   Skin:     General: Skin is warm and dry.   Neurological:      Mental Status: He is alert and oriented to person, place, and time.   Psychiatric:         Behavior: Behavior normal.          Laboratory:  CBC:  Recent Labs   Lab Result Units 06/14/25  0754   WBC K/uL 6.79   RBC M/uL 5.15   HGB gm/dL 14.3   HCT % 44.6   Platelet Count K/uL 283   MCV fL 87   MCH pg 27.8   MCHC g/dL 32.1     CMP:  Recent Labs   Lab Result Units 06/14/25  0754   Glucose mg/dL 75   Calcium mg/dL 8.9   Albumin g/dL 3.7   Protein Total gm/dL 6.8   Sodium mmol/L 135*   Potassium mmol/L 4.6   CO2 mmol/L 25   Chloride mmol/L 100   BUN mg/dL 12   ALP unit/L 87   ALT unit/L 131*   AST unit/L 47*   Bilirubin Total mg/dL 0.7     URINALYSIS:  No results for input(s): "COLORU", "CLARITYU", "SPECGRAV", "PHUR", "PROTEINUA", "GLUCOSEU", "BILIRUBINCON", "BLOODU", "WBCU", "RBCU", "BACTERIA", "MUCUS", "NITRITE", "LEUKOCYTESUR", "UROBILINOGEN", "HYALINECASTS" in the last 2160 hours.   LIPIDS:  Recent Labs   Lab Result Units 06/14/25  0754   HDL Cholesterol mg/dL 34*   Cholesterol Total mg/dL 128   Triglyceride mg/dL 70   LDL Cholesterol mg/dL 80.0   HDL/Cholesterol Ratio % 26.6   Non HDL Cholesterol mg/dL 94   Cholesterol/HDL Ratio  3.8     TSH:  No results for input(s): "TSH" in the last 2160 hours.  A1C:  Recent Labs   Lab Result Units 06/14/25  0754   Hemoglobin A1c % 5.4       Radiology:  No results found in the last 30 days.     Assessment/Plan     David Nicole Jr. is a 65 y.o.male with:    1. Annual physical exam    2. Type 2 diabetes mellitus without complication, without long-term current use of insulin    3. " Hypertension associated with diabetes  - verapamiL (VERELAN) 240 MG C24P; Take 1 capsule (240 mg total) by mouth once daily.  Dispense: 90 capsule; Refill: 3    4. Hyperlipidemia due to type 2 diabetes mellitus    5. Recurrent major depressive disorder, in partial remission    6. Morbid obesity    7. Gastroesophageal reflux disease without esophagitis    8. Transaminitis  - Hepatic function panel; Future  - Gamma GT; Future    9. COVID-19        -transaminitis possibly 2/2 COVID vs OTC meds, repeat in 1 week  -get eye exam from Dr. Poe  -counseled on increasing exercise  -Continue current medications and maintain follow up with specialists.    -Follow up in about 6 months (around 12/17/2025).       Estelle Parker MD  [facility]

## 2025-06-17 NOTE — PROGRESS NOTES
Records requested from [facility] for diabetic eye exam.  Chart reviewed, immunizations reconciled, Care Everywhere updated.  Bandar Brasher LPN  Care Coordinator  [facility] [facility]

## 2025-06-17 NOTE — LETTER
AUTHORIZATION FOR RELEASE OF   CONFIDENTIAL INFORMATION    Dear [facility],    We are seeing David Nicole Jr., date of birth 1960, in the clinic at [facility]. Estelle Parker MD is the patient's PCP. David Nicole Jr. has an outstanding lab/procedure at the time we reviewed his chart. In order to help keep his health information updated, he has authorized us to request the following medical record(s):        (  )  MAMMOGRAM                                      (  )  COLONOSCOPY      (  )  PAP SMEAR                                          (  )  OUTSIDE LAB RESULTS     (  )  DEXA SCAN                                          (X)  EYE EXAM            (  )  FOOT EXAM                                          (  )  ENTIRE RECORD     (  )  OUTSIDE IMMUNIZATIONS                 (  )  _______________         Please fax records to Estelle Parker MD,  at [phone] or email to [email or web address].       If you have any questions, please contact Bandar Brasher LPN at [phone].           Patient Name: David Nicole Jr.  : 1960  Patient Phone #: [phone]

## 2025-06-17 NOTE — ASSESSMENT & PLAN NOTE
Not exercising and die could be better.  A1C 5.4 in 6/2025, stable on Ozempic 2 mg. No hypoglycemia.  No issues with metformin in past.

## 2025-06-25 NOTE — PROGRESS NOTES
Patient was referred to the [facility] Non-Opioid program by PCP. CHW reached out to patient who stated [he] was not interested in enrolling in the [facility] Non-Opioid program.  CHW informed patient to notify [his] PCP of [his] wishes to engage in the [facility] Non-Opioid  program in the future.  CHW sent follow-up message to PCP via [employer]. Patient also stated that he would like to know once the [facility] programs go virtual because he is unable to miss work.   
Ambulatory

## 2025-07-12 ENCOUNTER — LAB VISIT (OUTPATIENT)
Dept: LAB | Facility: HOSPITAL | Age: 65
End: 2025-07-12
Payer: COMMERCIAL

## 2025-07-12 DIAGNOSIS — R74.01 TRANSAMINITIS: ICD-10-CM

## 2025-07-12 LAB
ALBUMIN SERPL BCP-MCNC: 3.8 G/DL (ref 3.5–5.2)
ALP SERPL-CCNC: 86 UNIT/L (ref 40–150)
ALT SERPL W/O P-5'-P-CCNC: 28 UNIT/L (ref 10–44)
AST SERPL-CCNC: 28 UNIT/L (ref 11–45)
BILIRUB DIRECT SERPL-MCNC: 0.3 MG/DL (ref 0.1–0.3)
BILIRUB SERPL-MCNC: 0.7 MG/DL (ref 0.1–1)
GGT SERPL-CCNC: 18 U/L (ref 8–55)
PROT SERPL-MCNC: 6.4 GM/DL (ref 6–8.4)

## 2025-07-12 PROCEDURE — 36415 COLL VENOUS BLD VENIPUNCTURE: CPT

## 2025-07-12 PROCEDURE — 80076 HEPATIC FUNCTION PANEL: CPT

## 2025-07-12 PROCEDURE — 82977 ASSAY OF GGT: CPT

## 2025-08-06 DIAGNOSIS — E11.9 TYPE 2 DIABETES MELLITUS WITHOUT COMPLICATION, UNSPECIFIED WHETHER LONG TERM INSULIN USE: ICD-10-CM

## 2025-08-08 DIAGNOSIS — K21.9 GASTROESOPHAGEAL REFLUX DISEASE WITHOUT ESOPHAGITIS: ICD-10-CM

## 2025-08-08 RX ORDER — OMEPRAZOLE 20 MG/1
CAPSULE, DELAYED RELEASE ORAL
Qty: 90 CAPSULE | Refills: 3 | Status: SHIPPED | OUTPATIENT
Start: 2025-08-08

## 2025-08-08 NOTE — TELEPHONE ENCOUNTER
Refill Decision Note   David Nicole  is requesting a refill authorization.  Brief Assessment and Rationale for Refill:  Approve     Medication Therapy Plan:         Comments:     Note composed:10:21 AM 08/08/2025